# Patient Record
Sex: MALE | Race: WHITE | NOT HISPANIC OR LATINO | Employment: OTHER | URBAN - METROPOLITAN AREA
[De-identification: names, ages, dates, MRNs, and addresses within clinical notes are randomized per-mention and may not be internally consistent; named-entity substitution may affect disease eponyms.]

---

## 2017-12-30 ENCOUNTER — OFFICE VISIT (OUTPATIENT)
Dept: URGENT CARE | Facility: URGENT CARE | Age: 62
End: 2017-12-30

## 2017-12-30 VITALS
DIASTOLIC BLOOD PRESSURE: 92 MMHG | TEMPERATURE: 98.7 F | OXYGEN SATURATION: 96 % | HEART RATE: 95 BPM | WEIGHT: 250 LBS | SYSTOLIC BLOOD PRESSURE: 140 MMHG

## 2017-12-30 DIAGNOSIS — R59.0 ANTERIOR CERVICAL ADENOPATHY: Primary | ICD-10-CM

## 2017-12-30 PROCEDURE — 99203 OFFICE O/P NEW LOW 30 MIN: CPT | Performed by: STUDENT IN AN ORGANIZED HEALTH CARE EDUCATION/TRAINING PROGRAM

## 2017-12-30 NOTE — PROGRESS NOTES
SUBJECTIVE:  Stevie Dotson is a pleasant 63 y/o male presenting to the clinic for evaluation of R neck swelling. The patient notes his symptoms began with a sore tooth for one day, which subsided yesterday. After awakening this morning, he notes that the right side of his neck seemed to have mild swelling. He denies any pain with this. No fever, nausea, or vomiting. He has not noticed discharge from this tooth, which was previously sore. He has never had same or similar symptoms. No treatments have been tried.    Notes he is otherwise healthy  No recent surgeries    Social History     Social History     Marital status:      Spouse name: N/A     Number of children: N/A     Years of education: N/A     Occupational History     Not on file.     Social History Main Topics     Smoking status: Not on file     Smokeless tobacco: Not on file     Alcohol use Not on file     Drug use: Not on file     Sexual activity: Not on file     Other Topics Concern     Not on file     Social History Narrative     No narrative on file          OBJECTIVE:  BP (!) 140/92 (BP Location: Right arm)  Pulse 95  Temp 98.7  F (37.1  C) (Oral)  Wt 250 lb (113.4 kg)  SpO2 96%  Physical Exam   Constitutional: He is well-developed, well-nourished, and in no distress. No distress.   HENT:   Head: Normocephalic and atraumatic.   Mouth/Throat: Uvula is midline, oropharynx is clear and moist and mucous membranes are normal. He does not have dentures. No oral lesions. Normal dentition. No dental abscesses, lacerations or dental caries. No oropharyngeal exudate or posterior oropharyngeal edema.   Neck: Carotid bruit is not present.       Cardiovascular: Normal rate, regular rhythm and normal heart sounds.    Pulmonary/Chest: Effort normal and breath sounds normal.   Musculoskeletal: Normal range of motion.   Skin: He is not diaphoretic.         ASSESSMENT/PLAN:  Stevie was seen today for urgent care and facial swelling.    Diagnoses and all  orders for this visit:    Anterior cervical adenopathy      Presentation and physical exam is consistent with anterior cervical adenopathy. The patient previously had a tooth ache, which has now resolved. There are no signs of infection or abscess. The patient does not have pain associated with the adenopathy. Likely benign in nature due to being afebrile and feeling well overall. The patient was encouraged to use 600 my ibuprofen 3 times a day to help with swelling. He can also use warm compress to help decrease swelling overall. Educated on the benign nature. If symptoms are not resolving over the next 1-2 weeks, follow-up with PCP. At that time, possible US.    Bipin Wilkins PA-C

## 2017-12-30 NOTE — MR AVS SNAPSHOT
"              After Visit Summary   2017    Stevie Dotson    MRN: [id number]           Patient Information     Date Of Birth          1955        Visit Information        Provider Department      2017 9:05 AM Bipin Wilkins PA-C [facility]        Today's Diagnoses     Anterior cervical adenopathy    -  1       Follow-ups after your visit        Who to contact     If you have questions or need follow up information about today's clinic visit or your schedule please contact [facility] directly at [phone].  Normal or non-critical lab and imaging results will be communicated to you by PaxVaxhart, letter or phone within 4 business days after the clinic has received the results. If you do not hear from us within 7 days, please contact the clinic through PaxVaxhart or phone. If you have a critical or abnormal lab result, we will notify you by phone as soon as possible.  Submit refill requests through Uanbai or call your pharmacy and they will forward the refill request to us. Please allow 3 business days for your refill to be completed.          Additional Information About Your Visit        MyChart Information     Uanbai lets you send messages to your doctor, view your test results, renew your prescriptions, schedule appointments and more. To sign up, go to [email or web address] . Click on \"Log in\" on the left side of the screen, which will take you to the Welcome page. Then click on \"Sign up Now\" on the right side of the page.     You will be asked to enter the access code listed below, as well as some personal information. Please follow the directions to create your username and password.     Your access code is: [email or web address]  Expires: 3/30/2018  9:54 AM     Your access code will  in 90 days. If you need help or a new code, please call your [place] clinic or [phone].        Care EveryWhere ID     This is your Care EveryWhere ID. This could " be used by other organizations to access your [place] medical records  [id number]        Your Vitals Were     Pulse Temperature Pulse Oximetry             95 98.7  F (37.1  C) (Oral) 96%          Blood Pressure from Last 3 Encounters:   12/30/17 (!) 140/92    Weight from Last 3 Encounters:   12/30/17 250 lb (113.4 kg)              Today, you had the following     No orders found for display       Primary Care Provider Office Phone # Fax #    [facility] [phone] [address] [facility] [address]        Equal Access to Services     [facility]: Hadii aad ku hadasho Soomaali, waaxda luqadaha, qaybta kaalmada adeegyada, cindi erickson adejoana hall . So [facility] [phone].    ATENCIÓN: Si habla español, tiene a cain disposición servicios gratuitos de asistencia lingüística. Llame al [phone].    We comply with applicable federal civil rights laws and [place] laws. We do not discriminate on the basis of race, color, national origin, age, disability, sex, sexual orientation, or gender identity.            Thank you!     Thank you for choosing [facility]  for your care. Our goal is always to provide you with excellent care. Hearing back from our patients is one way we can continue to improve our services. Please take a few minutes to complete the written survey that you may receive in the mail after your visit with us. Thank you!             Your Updated Medication List - Protect others around you: Learn how to safely use, store and throw away your medicines at www.disposemymeds.org.          This list is accurate as of: 12/30/17  9:54 AM.  Always use your most recent med list.                   Brand Name Dispense Instructions for use Diagnosis    FLUTICASONE FUROATE NA           OMEPRAZOLE PO

## 2017-12-30 NOTE — NURSING NOTE
Chief Complaint   Patient presents with     Urgent Care     Facial Swelling     right side of neck swelling following tooth ache      Initial BP (!) 140/92 (BP Location: Right arm)  Pulse 95  Temp 98.7  F (37.1  C) (Oral)  Wt 250 lb (113.4 kg)  SpO2 96% There is no height or weight on file to calculate BMI..  BP completed using cuff size: large    Angle Chen CMA.............................December 30, 2017 9:19 AM

## 2018-11-14 ENCOUNTER — OFFICE VISIT (OUTPATIENT)
Dept: FAMILY MEDICINE | Facility: CLINIC | Age: 63
End: 2018-11-14
Payer: COMMERCIAL

## 2018-11-14 VITALS
WEIGHT: 254.5 LBS | RESPIRATION RATE: 16 BRPM | OXYGEN SATURATION: 96 % | SYSTOLIC BLOOD PRESSURE: 138 MMHG | TEMPERATURE: 98.9 F | HEIGHT: 74 IN | DIASTOLIC BLOOD PRESSURE: 90 MMHG | BODY MASS INDEX: 32.66 KG/M2 | HEART RATE: 64 BPM

## 2018-11-14 DIAGNOSIS — L72.3 SEBACEOUS CYST: Primary | ICD-10-CM

## 2018-11-14 DIAGNOSIS — Z12.11 SCREEN FOR COLON CANCER: ICD-10-CM

## 2018-11-14 PROCEDURE — 99213 OFFICE O/P EST LOW 20 MIN: CPT | Performed by: FAMILY MEDICINE

## 2018-11-14 NOTE — PATIENT INSTRUCTIONS
Follow-up with Dermatology for consideration of sebaceous cyst removal, as discussed.    Follow-up if redness, swelling, or pain involving the sebaceous cyst site prior to your appointment, as discussed.      Please schedule your colonoscopy, as discussed.

## 2018-11-14 NOTE — PROGRESS NOTES
"SUBJECTIVE:   Stevie Dotson is a 63 year old male presenting with a chief complaint of   Chief Complaint   Patient presents with     Abscess     on back      HPI:  The patient is a 63-year-old male, with history of a skin lesion/bump involving his back, first noticed 10 years ago.  This was stable until 1-2 weeks ago.  Skin lesion is not enlarging, but patient noticed some redness and pain with leaning on it 1-2 weeks ago.  Wife expressed a small amount of whitish material from the lesion a few days ago, which resolved the redness and pain.  Patient denies fever, chills, nausea, or vomiting, but he is concerned that material can still be expressed from lesion.  Patient states that he has had previous sebaceous cysts removed from his neck and axilla.  Patient states that he is here, as he would like to have the \"abscess\" drained.    Patient states that he is due for a follow-up colonoscopy, which he would like to schedule.  Patient states that his previous colonoscopy showed an unspecified polyp, but records are not available for review.  Patient denies bowel changes, melena, hematochezia, chest pain, shortness of breath, heart palpitations, history of CAD, NSAID treatment, or Aspirin use.  He does not have a recent Creatinine on file.    Review of Systems - See above.     Current Outpatient Prescriptions   Medication Sig Dispense Refill     FLUTICASONE FUROATE NA        OMEPRAZOLE PO        Social History   Substance Use Topics     Smoking status: Former Smoker     Smokeless tobacco: Never Used     Alcohol use Yes       OBJECTIVE  /90 (BP Location: Right arm, Patient Position: Chair, Cuff Size: Adult Large)  Pulse 64  Temp 98.9  F (37.2  C) (Oral)  Resp 16  Ht 6' 2\" (1.88 m)  Wt 254 lb 8 oz (115.4 kg)  SpO2 96%  BMI 32.68 kg/m2    Physical Exam    GENERAL APPEARANCE:  Awake, alert, and in no acute distress.  PSYCHIATRIC:  Pleasant affect.  HEENT:  Sclera anicteric.  No conjunctivitis.    NECK:  " Spontaneous full range of motion.    HEART:  Normal S1, S2.  Regular rate and rhythm.  No murmurs, rubs, or gallops.  LUNGS:  No respiratory distress.  No wheezes, rales, or rhonchi.  EXTREMITIES:  Moves 4 extremities.     NEUROLOGIC:  Gait within normal limits.    SKIN: The patient has a 2 x 3 cm sebaceous cyst involving his thoracic back (midline), with a central punctum noted.  No erythema, tenderness to palpation, edema, or fluctuance noted.    Labs:  No results found for this or any previous visit (from the past 24 hour(s)).    ASSESSMENT:      ICD-10-CM    1. Sebaceous cyst (back), without current infection or abscess. L72.3 DERMATOLOGY REFERRAL   2. Screen for colon cancer.  Patient states that he is due for a follow-up Colonoscopy. Records of his previous study are not available for review, but patient states that an unspecified polyp was seen. Z12.11 GASTROENTEROLOGY ADULT REF PROCEDURE ONLY Makayla Meyer [phone]     PLAN:      Discussed definitive treatment/removal of the patient's sebaceous cyst, which patient would like removed at this time.    Discussed lack of indication for I&D (no current infection or abscess).    See the Patient Instructions below.      Patient declines a Creatinine prior to his follow up Colonoscopy, which he would like to schedule.    Patient Instructions     Follow-up with Dermatology for consideration of sebaceous cyst removal, as discussed.    Follow-up if redness, swelling, or pain involving the sebaceous cyst site prior to your appointment, as discussed.      Please schedule your colonoscopy, as discussed.    Discussed risks and benefits of treatment strategies, as noted in the Assessment and Plan sections.    The patient was discharged ambulatory and in stable condition post discussion of follow up.     Marcella Solorio MD  [facility]

## 2018-11-14 NOTE — MR AVS SNAPSHOT
After Visit Summary   11/14/2018    Stevie Dotson    MRN: [id number]           Patient Information     Date Of Birth          1955        Visit Information        Provider Department      11/14/2018 11:20 AM Marcella Solorio MD [facility]        Today's Diagnoses     Sebaceous cyst    -  1    Screen for colon cancer          Care Instructions      Follow-up with Dermatology for consideration of sebaceous cyst removal, as discussed.    Follow-up if redness, swelling, or pain involving the sebaceous cyst site prior to your appointment, as discussed.      Please schedule your colonoscopy, as discussed.          Follow-ups after your visit        Additional Services     DERMATOLOGY REFERRAL       Your provider has referred you to: [facility]: [facility] [place] [phone]    Please be aware that coverage of these services is subject to the terms and limitations of your health insurance plan.  Call member services at your health plan with any benefit or coverage questions.      Please bring the following to your appointment:  Any x-rays, CTs or MRIs which have been performed.  Contact the facility where they were done to arrange for  prior to your scheduled appointment.  Any new CT, MRI or other procedures ordered by your specialist must be performed at a [place] facility or coordinated by your clinic's referral office.    List of current medications   This referral request   Any documents/labs given to you for this referral            GASTROENTEROLOGY ADULT REF PROCEDURE ONLY Jaydens Mitch [phone]       Last Lab Result: No results found for: CR  Body mass index is 32.68 kg/(m^2).     Needed:  No  Language:  English    Patient will be contacted to schedule procedure.     Please be aware that coverage of these services is subject to the terms and limitations of your health insurance plan.  Call member services at your  "health plan with any benefit or coverage questions.  Any procedures must be performed at a [place] facility OR coordinated by your clinic's referral office.    Please bring the following with you to your appointment:    (1) Any X-Rays, CTs or MRIs which have been performed.  Contact the facility where they were done to arrange for  prior to your scheduled appointment.    (2) List of current medications   (3) This referral request   (4) Any documents/labs given to you for this referral                  Who to contact     If you have questions or need follow up information about today's clinic visit or your schedule please contact [facility] directly at [phone].  Normal or non-critical lab and imaging results will be communicated to you by MyChart, letter or phone within 4 business days after the clinic has received the results. If you do not hear from us within 7 days, please contact the clinic through MyChart or phone. If you have a critical or abnormal lab result, we will notify you by phone as soon as possible.  Submit refill requests through TrashOut or call your pharmacy and they will forward the refill request to us. Please allow 3 business days for your refill to be completed.          Additional Information About Your Visit        Care EveryWhere ID     This is your Care EveryWhere ID. This could be used by other organizations to access your [place] medical records  [id number]        Your Vitals Were     Pulse Temperature Respirations Height Pulse Oximetry BMI (Body Mass Index)    64 98.9  F (37.2  C) (Oral) 16 6' 2\" (1.88 m) 96% 32.68 kg/m2       Blood Pressure from Last 3 Encounters:   11/14/18 138/90   12/30/17 (!) 140/92    Weight from Last 3 Encounters:   11/14/18 254 lb 8 oz (115.4 kg)   12/30/17 250 lb (113.4 kg)              We Performed the Following     DERMATOLOGY REFERRAL     GASTROENTEROLOGY ADULT REF PROCEDURE ONLY Makayla Meyer [phone]        Primary " Care Provider Fax #    Physician No Ref-Primary [phone]       No address on file        Equal Access to Services     [facility] : Hadii aad ku hadmarkywendi Kellysuman, wagradyda anglealexandraha, lisa torresda josesitochuck, cindi baltain hayaadaren ebllamyjoana fernandez rafael babin. So [facility] [phone].    ATENCIÓN: Si habla español, tiene a cain disposición servicios gratuitos de asistencia lingüística. Llame al [phone].    We comply with applicable federal civil rights laws and [place] laws. We do not discriminate on the basis of race, color, national origin, age, disability, sex, sexual orientation, or gender identity.            Thank you!     Thank you for choosing [facility]  for your care. Our goal is always to provide you with excellent care. Hearing back from our patients is one way we can continue to improve our services. Please take a few minutes to complete the written survey that you may receive in the mail after your visit with us. Thank you!             Your Updated Medication List - Protect others around you: Learn how to safely use, store and throw away your medicines at www.disposemymeds.org.          This list is accurate as of 11/14/18 11:56 AM.  Always use your most recent med list.                   Brand Name Dispense Instructions for use Diagnosis    FLUTICASONE FUROATE NA           OMEPRAZOLE PO

## 2018-12-07 ENCOUNTER — TELEPHONE (OUTPATIENT)
Dept: DERMATOLOGY | Facility: CLINIC | Age: 63
End: 2018-12-07

## 2018-12-07 ENCOUNTER — OFFICE VISIT (OUTPATIENT)
Dept: DERMATOLOGY | Facility: CLINIC | Age: 63
End: 2018-12-07
Attending: FAMILY MEDICINE
Payer: COMMERCIAL

## 2018-12-07 VITALS — SYSTOLIC BLOOD PRESSURE: 154 MMHG | DIASTOLIC BLOOD PRESSURE: 101 MMHG | OXYGEN SATURATION: 97 % | HEART RATE: 67 BPM

## 2018-12-07 DIAGNOSIS — L72.0 EIC (EPIDERMAL INCLUSION CYST): Primary | ICD-10-CM

## 2018-12-07 PROCEDURE — 99202 OFFICE O/P NEW SF 15 MIN: CPT | Performed by: PHYSICIAN ASSISTANT

## 2018-12-07 NOTE — PROGRESS NOTES
HPI:   Stevie Dotson is a 63 year old male who presents for evaluation of cyst on back  chief complaint  Location: mid back   Condition present for:  awhile.   Previous treatments include: none    Review Of Systems  Eyes: negative  Ears/Nose/Throat: negative  Respiratory: No shortness of breath, dyspnea on exertion, cough, or hemoptysis  Cardiovascular: negative  Gastrointestinal: negative  Genitourinary: negative  Musculoskeletal: negative  Neurologic: negative  Psychiatric: negative    This document serves as a record of the services and decisions personally performed and made by Izabella Russo, MS, PA-C. It was created on her behalf by Adri Orta, a trained medical scribe. The creation of this document is based on the provider's statements to the medical scribe.  Adri Orta 10:50 AM December 7, 2018    PHYSICAL EXAM:    BP (!) 154/101  Pulse 67  SpO2 97%  Skin exam performed as follows: Type 2 skin. Mood appropriate  Alert and Oriented X 3. Well developed, well nourished in no distress.  General appearance: Normal  Head including face: Normal  Eyes: conjunctiva and lids: Normal  Mouth: Lips, teeth, gums: Normal  Neck: Normal  Chest-breast/axillae: Normal  Back: Normal  Spleen and liver: Normal  Cardiovascular: Exam of peripheral vascular system by observation for swelling, varicosities, edema: Normal  Genitalia: groin, buttocks: Normal  Extremities: digits/nails (clubbing): Normal  Eccrine and Apocrine glands: Normal  Right upper extremity: Normal  Left upper extremity: Normal  Right lower extremity: Normal  Left lower extremity: Normal  Skin: Scalp and body hair: See below    1. 25 mm subcutaneous nodule located on mid lower back    ASSESSMENT/PLAN:     1. EIC on the mid lower back- advised. Discussed excision with Dr. Andrews or Swapna Clarke if bothersome.  2. Patient to follow up with Primary Care provider regarding elevated blood pressure.      Follow-up: follow up for  excision  CC:   Scribed By: Adri Orta, Medical Scribe    The information in this document, created by the medical scribe for me, accurately reflects the services I personally performed and the decisions made by me. I have reviewed and approved this document for accuracy prior to leaving the patient care area.  December 7, 2018 10:53 AM    Izabella Russo MS, PA-C

## 2018-12-07 NOTE — LETTER
12/7/2018         RE: Stevie Dotson  [address]  [place] [address]        Dear Colleague,    Thank you for referring your patient, Stevie Dotson, to the [facility]. Please see a copy of my visit note below.    HPI:   Stevie Dotson is a 63 year old male who presents for evaluation of cyst on back  chief complaint  Location: mid back   Condition present for:  awhile.   Previous treatments include: none    Review Of Systems  Eyes: negative  Ears/Nose/Throat: negative  Respiratory: No shortness of breath, dyspnea on exertion, cough, or hemoptysis  Cardiovascular: negative  Gastrointestinal: negative  Genitourinary: negative  Musculoskeletal: negative  Neurologic: negative  Psychiatric: negative    This document serves as a record of the services and decisions personally performed and made by Izabella Russo, MS, PA-C. It was created on her behalf by Adri Orta, a trained medical scribe. The creation of this document is based on the provider's statements to the medical scribe.  Adri Orta 10:50 AM December 7, 2018    PHYSICAL EXAM:    BP (!) 154/101  Pulse 67  SpO2 97%  Skin exam performed as follows: Type 2 skin. Mood appropriate  Alert and Oriented X 3. Well developed, well nourished in no distress.  General appearance: Normal  Head including face: Normal  Eyes: conjunctiva and lids: Normal  Mouth: Lips, teeth, gums: Normal  Neck: Normal  Chest-breast/axillae: Normal  Back: Normal  Spleen and liver: Normal  Cardiovascular: Exam of peripheral vascular system by observation for swelling, varicosities, edema: Normal  Genitalia: groin, buttocks: Normal  Extremities: digits/nails (clubbing): Normal  Eccrine and Apocrine glands: Normal  Right upper extremity: Normal  Left upper extremity: Normal  Right lower extremity: Normal  Left lower extremity: Normal  Skin: Scalp and body hair: See below    1. 25 mm subcutaneous nodule located on  mid lower back    ASSESSMENT/PLAN:     1. EIC on the mid lower back- advised. Discussed excision with Dr. Andrews or Swapna Clarke if bothersome.  2. Patient to follow up with Primary Care provider regarding elevated blood pressure.      Follow-up: follow up for excision  CC:   Scribed By: Adri Orta, Medical Scribe    The information in this document, created by the medical scribe for me, accurately reflects the services I personally performed and the decisions made by me. I have reviewed and approved this document for accuracy prior to leaving the patient care area.  December 7, 2018 10:53 AM    Izabella Arrington MS, PAARMIDA      Again, thank you for allowing me to participate in the care of your patient.        Sincerely,        Izabella Arrington PA-C

## 2018-12-07 NOTE — TELEPHONE ENCOUNTER
Called and spoke to patient. Informed patient that we have a cyst removal appointment here in [place] if that's more convenient. Patient stated he will just keep the current appointment in . Patient voiced understanding.    JUANITA Garcia-BSN  [facility]  [phone]

## 2018-12-07 NOTE — MR AVS SNAPSHOT
After Visit Summary   12/7/2018    Stevie Dotson    MRN: [id number]           Patient Information     Date Of Birth          1955        Visit Information        Provider Department      12/7/2018 11:00 AM Izabella Russo PA-C [facility]        Today's Diagnoses     EIC (epidermal inclusion cyst)    -  1       Follow-ups after your visit        Your next 10 appointments already scheduled     Dec 14, 2018  2:40 PM CST   PROCEDURE with Tasneem Mckinley PA-C   [facility] ([facility])    [address] [place] [id number] [phone]              Who to contact     If you have questions or need follow up information about today's clinic visit or your schedule please contact [facility] directly at [phone].  Normal or non-critical lab and imaging results will be communicated to you by MyChart, letter or phone within 4 business days after the clinic has received the results. If you do not hear from us within 7 days, please contact the clinic through MyChart or phone. If you have a critical or abnormal lab result, we will notify you by phone as soon as possible.  Submit refill requests through [facility] or call your pharmacy and they will forward the refill request to us. Please allow 3 business days for your refill to be completed.          Additional Information About Your Visit        Care EveryWhere ID     This is your Care EveryWhere ID. This could be used by other organizations to access your [facility] medical records  [id number]        Your Vitals Were     Pulse Pulse Oximetry                67 97%           Blood Pressure from Last 3 Encounters:   12/07/18 (!) 154/101   11/14/18 138/90   12/30/17 (!) 140/92    Weight from Last 3 Encounters:   11/14/18 115.4 kg (254 lb 8 oz)   12/30/17 113.4 kg (250 lb)              Today, you had the following     No orders found for  display       Primary Care Provider Fax #    Physician No Ref-Primary [phone]       No address on file        Equal Access to Services     LILLY CONNELL : Hadii aad ku hadshirley Gomez, lizethda anglealexandraha, lisa lu josesitochuck, cindi batlain hayaadaren bellamyjoana fernandez laJohanmary babin. [facility] [phone].    ATENCIÓN: Si habla español, tiene a cain disposición servicios gratuitos de asistencia lingüística. Llame al [phone].    We comply with applicable federal civil rights laws and [place] laws. We do not discriminate on the basis of race, color, national origin, age, disability, sex, sexual orientation, or gender identity.            Thank you!     Thank you for choosing [facility]  for your care. Our goal is always to provide you with excellent care. Hearing back from our patients is one way we can continue to improve our services. Please take a few minutes to complete the written survey that you may receive in the mail after your visit with us. Thank you!             Your Updated Medication List - Protect others around you: Learn how to safely use, store and throw away your medicines at www.disposemymeds.org.          This list is accurate as of 12/7/18 11:07 AM.  Always use your most recent med list.                   Brand Name Dispense Instructions for use Diagnosis    FLUTICASONE FUROATE NA           OMEPRAZOLE PO

## 2018-12-14 ENCOUNTER — OFFICE VISIT (OUTPATIENT)
Dept: FAMILY MEDICINE | Facility: CLINIC | Age: 63
End: 2018-12-14
Payer: COMMERCIAL

## 2018-12-14 VITALS — DIASTOLIC BLOOD PRESSURE: 97 MMHG | SYSTOLIC BLOOD PRESSURE: 156 MMHG | OXYGEN SATURATION: 95 % | HEART RATE: 92 BPM

## 2018-12-14 DIAGNOSIS — L72.0 EPIDERMAL CYST: Primary | ICD-10-CM

## 2018-12-14 PROCEDURE — 13101 CMPLX RPR TRUNK 2.6-7.5 CM: CPT | Performed by: PHYSICIAN ASSISTANT

## 2018-12-14 PROCEDURE — 99212 OFFICE O/P EST SF 10 MIN: CPT | Mod: 25 | Performed by: PHYSICIAN ASSISTANT

## 2018-12-14 PROCEDURE — 88304 TISSUE EXAM BY PATHOLOGIST: CPT | Mod: TC | Performed by: PHYSICIAN ASSISTANT

## 2018-12-14 PROCEDURE — 11403 EXC TR-EXT B9+MARG 2.1-3CM: CPT | Mod: 51 | Performed by: PHYSICIAN ASSISTANT

## 2018-12-14 NOTE — PROGRESS NOTES
HPI:  Stevie Dotson is a 63 year old male patient here today for cyst excision on back .  Patient states this has been present for 12 years.  Patient reports the following symptoms: growing and draining .  Patient reports the following previous treatments: none.  Patient reports the following modifying factors: none.  Associated symptoms: none. Also has some brown spots on back that have been there for 12+ years. No tx tried.  Patient has no other skin complaints today.  Remainder of the HPI, Meds, PMH, Allergies, FH, and SH was reviewed in chart.    Pertinent Hx:   No personal history of skin cancer    History reviewed. No pertinent past medical history.    History reviewed. No pertinent surgical history.     Family History   Problem Relation Age of Onset     Skin Cancer Father        Social History     Socioeconomic History     Marital status:      Spouse name: Not on file     Number of children: Not on file     Years of education: Not on file     Highest education level: Not on file   Social Needs     Financial resource strain: Not on file     Food insecurity - worry: Not on file     Food insecurity - inability: Not on file     Transportation needs - medical: Not on file     Transportation needs - non-medical: Not on file   Occupational History     Not on file   Tobacco Use     Smoking status: Former Smoker     Smokeless tobacco: Never Used   Substance and Sexual Activity     Alcohol use: Yes     Drug use: No     Sexual activity: Yes     Partners: Female   Other Topics Concern     Parent/sibling w/ CABG, MI or angioplasty before 65F 55M? Not Asked   Social History Narrative     Not on file       Outpatient Encounter Medications as of 12/14/2018   Medication Sig Dispense Refill     FLUTICASONE FUROATE NA        OMEPRAZOLE PO        No facility-administered encounter medications on file as of 12/14/2018.        Review Of Systems:  Skin: As above  Eyes: negative  Ears/Nose/Throat: negative  Respiratory:  No shortness of breath, dyspnea on exertion, cough, or hemoptysis  Cardiovascular: negative  Gastrointestinal: negative  Genitourinary: negative  Musculoskeletal: negative  Neurologic: negative  Psychiatric: negative  Hematologic/Lymphatic/Immunologic: negative  Endocrine: negative      Objective:     BP (!) 156/97   Pulse 92   SpO2 95%   Eyes: Conjunctivae/lids: Normal   ENT: Lips:  Normal  MSK: Normal  Cardiovascular: Peripheral edema none  Pulm: Breathing Normal  Neuro/Psych: Orientation: Normal; Mood/Affect: Normal, NAD, WDWN  Pt accompanied by: self  Following areas examined: face, neck, back  Castaneda skin type:ii   Findings:  Soft mobile smooth nodule on mid lower back 2.5cm  Brown, stuck-on scaly appearing papules on back    Assessment and Plan:  1) epidermal cyst 2.5cm on mid lower back  EXCISIONAL BIOPSY AND COMPLEX:  After thorough discussion of PGACAC, consent obtained, anesthesia with LEC and prep, the margins of the lesion were identified and an elliptical incision was made encompassing the lesion.  The incisions were made through to include the mid-subcutaneous tissue.  The lesion was removed en bloc and submitted for derm pathologic review. Because of the full-thickness nature of the wound and to avoid standing cone deformities, a complex linear repair was planned.  The wound edges were widely undermined until adequate tissue mobility was obtained.  Hemostasis was achieved.  The wound edges were then closed in a layered fashion, using Vicryl for subcutaneous stitches and FAPG sutures for running top stitches.  Postoperative length was 2.7 cm.  Patient will be contacted with result.  EBL minimal; complications none; wound care routine.  The patient was discharged in good condition and will return in one week for wound evaluation.    2) seborrheic keratoses  Treatment of these lesions would be purely cosmetic and not medically neccessary.  Lesion may recur and/or may not completely resolve. May  need additional treatment.  Different removal options including excision, cryotherapy, cautery and /or laser.      3) High blood pressure reading with history of hypertension    Patient to follow up with Primary Care provider regarding elevated blood pressure.      Follow up in 1 week for bandage change

## 2018-12-14 NOTE — NURSING NOTE
Recommended that patient return for bandage change in 7 days for best cosmetic result . Patient deferred and prefers his wife Betzaida, an RN  to do the bandage change. Writer included written instructions in after visit summary and verbally went over care . Patient verbally understands and will contact the clinic if they have any questions.    Elyssa Brooks LPN

## 2018-12-14 NOTE — PATIENT INSTRUCTIONS
PLEASE DO NOT GET WOUND WET. KEEP BANDAGE ON FOR 1 WEEK UNTIL YOUR FOLLOW UP.     Sutured Wound Care     [facility]: [phone]    [facility]: [phone]      1) IN 7 DAYS REMOVE FLAT BANDAGE AND SERI STRIPS  2) CLEAN/ DRY AROUND AREA AND RE-APPLY FRESH STERI STRIPS AND A FLAT BANDAGE   3) KEEP DRY FOR AN ADDITIONAL 7 DAYS (SPONGE BATH)  4) FOLLOW WOUND CARE INSTRUCTIONS PROVIDED BELOW      ? No strenuous activity for 48 hours. Resume moderate activity in 48 hours. No heavy exercising until you are seen for follow up in one week.     ? Take Tylenol as needed for discomfort.                         ? Do not drink alcoholic beverages for 48 hours.     ? Keep the pressure bandage in place for 24 hours. If the bandage becomes blood tinged or loose, reinforce it with gauze and tape.        (Refer to the reverse side of this page for management of bleeding).    ? Remove pressure bandage in 24 hours     ? Leave the flat bandage in place until your follow up appointment.    ? Keep the bandage dry. Wash around it carefully.    ? If the tape becomes soiled or starts to come off, reinforce it with additional paper tape.    ? Do not smoke for 3 weeks; smoking is detrimental to wound healing.    ? It is normal to have swelling and bruising around the surgical site. The bruising will fade in approximately 10-14 days. Elevate the area to reduce swelling.    ? Numbness, itchiness and sensitivity to temperature changes can occur after surgery and may take up to 18 months to normalize.      POSSIBLE COMPLICATIONS    BLEEDIN. Leave the bandage in place.  2. Use tightly rolled up gauze or a cloth to apply direct pressure over the bandage for 20   minutes.  3. Reapply pressure for an additional 20 minutes if necessary  4. Call the office or go to the nearest emergency room if pressure fails to stop the bleeding.  5. Use additional gauze and tape to maintain pressure once the bleeding has  stopped.        PAIN:    1. Post operative pain should slowly get better, never worse.  2. A severe increase in pain may indicate a problem. Call the office if this occurs.    In case of emergency phone: [phone]      Proper skin care from [facility]:    -Eliminate harsh soaps as they strip the natural oils from the skin, often resulting in dry itchy skin ( i.e. Dial, Zest, Samoan Spring)  -Use mild soaps such as Cetaphil or Dove Sensitive Skin in the shower. You do not need to use soap on arms, legs, and trunk every time you shower unless visibly soiled.   -Avoid hot or cold showers.  -After showering, lightly dry off and apply moisturizing within 2-3 minutes. This will help trap moisture in the skin.   -Aggressive use of a moisturizer at least 1-2 times a day to the entire body (including -Vanicream, Cetaphil, Aquaphor or Cerave) and moisturize hands after every washing.  -We recommend using moisturizers that come in a tub that needs to be scooped out, not a pump. This has more of an oil base. It will hold moisture in your skin much better than a water base moisturizer. The above recommended are non-pore clogging.    .... AFTER 14 DAYS PLEASE FOLLOW THESE INSTRUCTIONS    WOUND CARE INSTRUCTIONS   for  ONE WEEK AFTER SURGERY          1) Leave flat bandage on your skin for one week after today s bandage change.  2) In one week when you remove the bandage, you may resume your regular skin care routine, including washing with mild soap and water, applying moisturizer, make-up and sunscreen.    3) If there are any open or bleeding areas at the incision/graft site you should begin to cover the area with a bandage daily as follows:    1) Clean and dry the area with plain tap water using a Q-tip or sterile gauze pad.  2) Apply Polysporin or Bacitracin ointment to the open area.  3) Cover the wound with a band-aid or a sterile non-stick gauze pad and micropore paper tape.         SIGNS OF INFECTION  - If you  notice any of these signs of infection, call your doctor right away: expanding redness around the wound.  - Yellow or greenish-colored pus or cloudy wound drainage.    - Red streaking spreading from the wound.  - Increased swelling, tenderness, or pain around the wound.   - Fever.    Please remember that yellow and clear drainage from a wound can be normal and related to normal wound healing.  Isolated drainage from a wound without a combination of the above features does not indicate infection.       *Once the bandages are removed, the scar will be red and firm (especially in the lip/chin area). This is normal and will fade in time. It might take 6-12 months for this to happen.     *Massaging the area will help the scar soften and fade quicker. Begin to massage the area one month after the bandages have been removed. To massage apply pressure directly and firmly over the scar with the fingertips and move in a circular motion. Massage the area for a few minutes several times a day. Continue to massage the site for several months.    *Approximately 6-8 weeks after surgery it is not uncommon to see the formation of  tender pimple-like  bump along the scar. This is normal. As the scar continues to mature and the stitches underneath the skin begin to dissolve, this might occur. Do not pick or squeeze, this will resolve on it s own. Should one break open producing a small amount of drainage, apply Polysporin or Bacitracin ointment a few times a day until the wound is completely healed.    *Numbness in the surgical area is expected. It might take 12-18 months for the feeling to return to normal. During this time sensations of itchiness, tingling and occasional sharp pains might be noted. These feelings are normal and will subside once the nerves have completely healed.

## 2018-12-14 NOTE — LETTER
12/14/2018         RE: Stevie Dotson  [address]  [place] [address]        Dear Colleague,    Thank you for referring your patient, Stevie Dotson, to the [facility] [place]. Please see a copy of my visit note below.    HPI:  Stevie Dotson is a 63 year old male patient here today for cyst excision on back .  Patient states this has been present for 12 years.  Patient reports the following symptoms: growing and draining .  Patient reports the following previous treatments: none.  Patient reports the following modifying factors: none.  Associated symptoms: none. Also has some brown spots on back that have been there for 12+ years. No tx tried.  Patient has no other skin complaints today.  Remainder of the HPI, Meds, PMH, Allergies, FH, and SH was reviewed in chart.    Pertinent Hx:   No personal history of skin cancer    History reviewed. No pertinent past medical history.    History reviewed. No pertinent surgical history.     Family History   Problem Relation Age of Onset     Skin Cancer Father        Social History     Socioeconomic History     Marital status:      Spouse name: Not on file     Number of children: Not on file     Years of education: Not on file     Highest education level: Not on file   Social Needs     Financial resource strain: Not on file     Food insecurity - worry: Not on file     Food insecurity - inability: Not on file     Transportation needs - medical: Not on file     Transportation needs - non-medical: Not on file   Occupational History     Not on file   Tobacco Use     Smoking status: Former Smoker     Smokeless tobacco: Never Used   Substance and Sexual Activity     Alcohol use: Yes     Drug use: No     Sexual activity: Yes     Partners: Female   Other Topics Concern     Parent/sibling w/ CABG, MI or angioplasty before 65F 55M? Not Asked   Social History Narrative     Not on file       Outpatient Encounter Medications as of 12/14/2018    Medication Sig Dispense Refill     FLUTICASONE FUROATE NA        OMEPRAZOLE PO        No facility-administered encounter medications on file as of 12/14/2018.        Review Of Systems:  Skin: As above  Eyes: negative  Ears/Nose/Throat: negative  Respiratory: No shortness of breath, dyspnea on exertion, cough, or hemoptysis  Cardiovascular: negative  Gastrointestinal: negative  Genitourinary: negative  Musculoskeletal: negative  Neurologic: negative  Psychiatric: negative  Hematologic/Lymphatic/Immunologic: negative  Endocrine: negative      Objective:     BP (!) 156/97   Pulse 92   SpO2 95%   Eyes: Conjunctivae/lids: Normal   ENT: Lips:  Normal  MSK: Normal  Cardiovascular: Peripheral edema none  Pulm: Breathing Normal  Neuro/Psych: Orientation: Normal; Mood/Affect: Normal, NAD, WDWN  Pt accompanied by: self  Following areas examined: face, neck, back  Castaneda skin type:ii   Findings:  Soft mobile smooth nodule on mid lower back 2.5cm  Brown, stuck-on scaly appearing papules on back    Assessment and Plan:  1) epidermal cyst 2.5cm on mid lower back  EXCISIONAL BIOPSY AND COMPLEX:  After thorough discussion of PGACAC, consent obtained, anesthesia with LEC and prep, the margins of the lesion were identified and an elliptical incision was made encompassing the lesion.  The incisions were made through to include the mid-subcutaneous tissue.  The lesion was removed en bloc and submitted for derm pathologic review. Because of the full-thickness nature of the wound and to avoid standing cone deformities, a complex linear repair was planned.  The wound edges were widely undermined until adequate tissue mobility was obtained.  Hemostasis was achieved.  The wound edges were then closed in a layered fashion, using Vicryl for subcutaneous stitches and FAPG sutures for running top stitches.  Postoperative length was 2.7 cm.  Patient will be contacted with result.  EBL minimal; complications none; wound care routine.  The  patient was discharged in good condition and will return in one week for wound evaluation.    2) seborrheic keratoses  Treatment of these lesions would be purely cosmetic and not medically neccessary.  Lesion may recur and/or may not completely resolve. May need additional treatment.  Different removal options including excision, cryotherapy, cautery and /or laser.      3) High blood pressure reading with history of hypertension    Patient to follow up with Primary Care provider regarding elevated blood pressure.      Follow up in 1 week for bandage change      Again, thank you for allowing me to participate in the care of your patient.        Sincerely,        Tasneem Mckinley PA-C

## 2018-12-18 LAB — COPATH REPORT: NORMAL

## 2018-12-19 ENCOUNTER — TELEPHONE (OUTPATIENT)
Dept: FAMILY MEDICINE | Facility: CLINIC | Age: 63
End: 2018-12-19

## 2018-12-19 NOTE — TELEPHONE ENCOUNTER
Notes recorded by Tasneem Mckinley PA-C on 12/19/2018 at 8:37 AM CST  Skin, mid lower back :   - Epidermal inclusion cyst     This is a benign lesion that does not need any additional treatment. Please let me know if you have any questions.

## 2018-12-19 NOTE — TELEPHONE ENCOUNTER
Patient notified of test results and providers message, patient has no further questions.    Aleena LOZANORN BSN  [facility]  [phone]

## 2019-02-26 ENCOUNTER — TELEPHONE (OUTPATIENT)
Dept: FAMILY MEDICINE | Facility: CLINIC | Age: 64
End: 2019-02-26

## 2019-02-26 NOTE — TELEPHONE ENCOUNTER
Wife called and stated that patient is going to be seeing you next week for a routine physical. It has been over a year since labs have been drawn per wife.     She knows that patient has high cholesterol, blood pressure and blood sugars.     She would like a full set of labs before appointment so that patient and provider can talk them over at the visit.     Patients wife makes appointments for him.     Call patient back, left message and let him know that he should come fasting to appointment but labs will not be ordered before appointment at this time due to new to Dr. Holguin and needs to meet before hand.     Also encouraged him to have consent to communicate with his wife filled out at time of appointment.     Mary Mccann RN Flex

## 2019-02-26 NOTE — TELEPHONE ENCOUNTER
Will do recommended labs at visit after informed consent and shared decision making on benefit/risk as should be done with all procedures including lab work.

## 2019-03-08 ENCOUNTER — OFFICE VISIT (OUTPATIENT)
Dept: FAMILY MEDICINE | Facility: CLINIC | Age: 64
End: 2019-03-08
Payer: COMMERCIAL

## 2019-03-08 VITALS
TEMPERATURE: 98.8 F | OXYGEN SATURATION: 95 % | DIASTOLIC BLOOD PRESSURE: 92 MMHG | SYSTOLIC BLOOD PRESSURE: 134 MMHG | WEIGHT: 249 LBS | BODY MASS INDEX: 33 KG/M2 | HEART RATE: 66 BPM | HEIGHT: 73 IN | RESPIRATION RATE: 16 BRPM

## 2019-03-08 DIAGNOSIS — Z12.11 SCREEN FOR COLON CANCER: ICD-10-CM

## 2019-03-08 DIAGNOSIS — Z00.00 ROUTINE GENERAL MEDICAL EXAMINATION AT A HEALTH CARE FACILITY: Primary | ICD-10-CM

## 2019-03-08 DIAGNOSIS — N52.9 ERECTILE DYSFUNCTION, UNSPECIFIED ERECTILE DYSFUNCTION TYPE: ICD-10-CM

## 2019-03-08 DIAGNOSIS — R03.0 ELEVATED BLOOD PRESSURE READING WITHOUT DIAGNOSIS OF HYPERTENSION: ICD-10-CM

## 2019-03-08 LAB
ALBUMIN SERPL-MCNC: 4 G/DL (ref 3.4–5)
ALP SERPL-CCNC: 61 U/L (ref 40–150)
ALT SERPL W P-5'-P-CCNC: 38 U/L (ref 0–70)
ANION GAP SERPL CALCULATED.3IONS-SCNC: 6 MMOL/L (ref 3–14)
AST SERPL W P-5'-P-CCNC: 18 U/L (ref 0–45)
BILIRUB SERPL-MCNC: 1.6 MG/DL (ref 0.2–1.3)
BUN SERPL-MCNC: 15 MG/DL (ref 7–30)
CALCIUM SERPL-MCNC: 9.2 MG/DL (ref 8.5–10.1)
CHLORIDE SERPL-SCNC: 103 MMOL/L (ref 94–109)
CHOLEST SERPL-MCNC: 232 MG/DL
CO2 SERPL-SCNC: 27 MMOL/L (ref 20–32)
CREAT SERPL-MCNC: 1.08 MG/DL (ref 0.66–1.25)
GFR SERPL CREATININE-BSD FRML MDRD: 72 ML/MIN/{1.73_M2}
GLUCOSE SERPL-MCNC: 123 MG/DL (ref 70–99)
HDLC SERPL-MCNC: 41 MG/DL
LDLC SERPL CALC-MCNC: 163 MG/DL
NONHDLC SERPL-MCNC: 191 MG/DL
POTASSIUM SERPL-SCNC: 4.2 MMOL/L (ref 3.4–5.3)
PROT SERPL-MCNC: 7.7 G/DL (ref 6.8–8.8)
SODIUM SERPL-SCNC: 136 MMOL/L (ref 133–144)
TRIGL SERPL-MCNC: 138 MG/DL

## 2019-03-08 PROCEDURE — 80053 COMPREHEN METABOLIC PANEL: CPT | Performed by: FAMILY MEDICINE

## 2019-03-08 PROCEDURE — 36415 COLL VENOUS BLD VENIPUNCTURE: CPT | Performed by: FAMILY MEDICINE

## 2019-03-08 PROCEDURE — 80061 LIPID PANEL: CPT | Performed by: FAMILY MEDICINE

## 2019-03-08 PROCEDURE — 99396 PREV VISIT EST AGE 40-64: CPT | Performed by: FAMILY MEDICINE

## 2019-03-08 RX ORDER — SILDENAFIL 100 MG/1
100 TABLET, FILM COATED ORAL DAILY PRN
COMMUNITY
End: 2019-03-08

## 2019-03-08 RX ORDER — SILDENAFIL 100 MG/1
100 TABLET, FILM COATED ORAL DAILY PRN
Qty: 12 TABLET | Refills: 1 | Status: ON HOLD | OUTPATIENT
Start: 2019-03-08 | End: 2021-01-14

## 2019-03-08 ASSESSMENT — ENCOUNTER SYMPTOMS
ABDOMINAL PAIN: 0
CONSTIPATION: 0
FEVER: 0
HEMATURIA: 0
COUGH: 0
EYE PAIN: 0
HEMATOCHEZIA: 0
NERVOUS/ANXIOUS: 0
DIARRHEA: 0
DIZZINESS: 0
CHILLS: 0

## 2019-03-08 ASSESSMENT — MIFFLIN-ST. JEOR: SCORE: 1978.34
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
## 2019-03-08 NOTE — LETTER
"March 14, 2019      Stveie Dotson  [address]  [place] [facility] [address]        Dear ,    We are writing to inform you of your test results. The results of your recent total cholesterol test were elevated.  Your total cholesterol should be less than 200.     The primary goal of therapy is the LDL or \"bad\" cholesterol.  Your LDL level was elevated.  Your LDL goal based on risk factors (i.e. medical history, smoking, family history, high blood pressure, low HDL cholesterol) and age is less than 130.     Your HDL, or \"good\" cholesterol is normal.  Your goal is an HDL level above 40.     Triglycerides are another cholesterol component that is associated with heart disease.  Normal triglycerides are less than 150.  Your triglyceride level is normal.     I would recommend: increase daily fiber intake, weight loss, increase physical activity with a goal of 150 minutes moderate exercise weekly and decrease saturated fat intake to less than 7% of total calories.  With your current cholesterol levels national guidelines would recommend that you consider cholesterol medication.  I would have you consider taking Lipitor 20 mg daily.  If you would like to start medication then call and let us know or follow-up in clinic to further discuss.  If you would like to continue to work on diet and activity I would recommend a recheck in 6-12 months after dietary and activity change.     Your fasting blood sugar (glucose) level was high.  A blood sugar level between 100-125 is elevated and is considered to be \"pre-diabetes.\"  This is related to having a poor diet with too many sugary foods and carbohydrates found in foods such as pasta, bread, and potatoes.  It can also reflect low levels of activity.  I would recommend increasing your activity to 30 minutes most days of the week and improving your diet.  This should be checked in 1 year to make sure you are not progressing to diabetes.  We want you to make sure you " "are getting exercise every day and have a diet that is low in added sugar.     Your complete metabolic panel indicates normal kidney function, normal electrolyte levels (sodium, potassium, and calcium levels are normal), and normal liver function (ALT, AST).     Your bilirubin level was mildly elevated.  This can be related to liver issues but when it is the only abnormal liver test and the others are normal it is usually a benign genetic condition called Gilbert syndrome.  If you have never been told that you have an elevated bilirubin level previously then I would recommend we recheck this level in 3 months just to make sure it is Gilbert syndrome and no other issues are present.  You can just come in for a lab-only appointment at that time.     What lifestyle changes can I make to help improve my cholesterol levels?     Exercise regularly.   Exercise can raise HDL cholesterol levels and reduce levels of LDL cholesterol and triglycerides. If you haven't been exercising, try to work up to 30 minutes, 4 to 6 times a week.     Lose weight if you are overweight.   Being overweight can raise your cholesterol levels. Losing weight, even just 5 or 10 pounds, can lower your total cholesterol, LDL cholesterol and triglyceride levels.     Eat a heart-healthy diet.   Eat plenty of fresh fruits and vegetables. Fruits and vegetables are naturally low in fat. Not only do they add flavor and variety to your diet, but they are also the best source of fiber, vitamins and minerals for your body. Aim for 5 cups of fruits and vegetables every day, not counting potatoes, corn and rice. Potatoes, corn and rice count as carbohydrates.     Pick  good  fats over  bad  fats. Fat is part of a healthy diet, but you need to know the difference between  bad  fats and  good  fats. \"Bad  fats, such as saturated and trans fats, are found in foods such as butter; coconut and palm oil; saturated or partially hydrogenated vegetable fats such as " shortening and margarine; animal fats in meats; and fats in whole milk dairy products. Limit the amount of saturated fat in your diet, and avoid trans fat completely. Unsaturated fat is the  good  fat. Most fats in fish, vegetables, grains and tree nuts are unsaturated. Try to eat unsaturated fat in place of saturated fat. For example, you can use olive oil or canola oil in cooking instead of butter.     Use healthier cooking methods. Baking, broiling and roasting are the healthiest ways to prepare meat, poultry and other foods. Trim any outside fat or skin before cooking. Lean cuts can be pan-broiled or stir-fried. Use either a nonstick pan or nonstick cooking spray instead of adding fats such as butter or margarine. When eating out, ask how food is prepared. You can request that your food be baked, broiled or roasted, rather than fried.     Look for other sources of protein. Fish, dry beans, tree nuts, peas and lentils offer protein, nutrients and fiber without the cholesterol and saturated fats that meats have. Consider eating one  meatless  meal each week. Try substituting beans for meat in a favorite recipe, such as lasagna or chili. Snack on a handful of almonds or pecans. Soy is also an excellent source of protein. Good examples of soy include soy milk, edamame (green soy beans), tofu and soy protein shakes.     Get more fiber in your diet. Add good sources of fiber to your meals. Examples include fruits, vegetables, whole grains (such as oat bran, whole and rolled oats and barley), legumes (such as beans and peas) and nuts and seeds (such as ground flax seed). In addition to fiber, whole grains supply B-vitamins and important nutrients not found in foods made with white flour.     Eat more fish. Fish are an excellent source of omega-3 fatty acids. Wild-caught oily fish, such as salmon, tuna, mackerel and sardines, are the best sources of omega-3s, but all fish contain some amount of this beneficial fatty  acid. Aim for 2 6-oz servings each week.     Resulted Orders   Lipid panel reflex to direct LDL Fasting   Result Value Ref Range    Cholesterol 232 (H) <200 mg/dL      Comment:      Desirable:       <200 mg/dl    Triglycerides 138 <150 mg/dL      Comment:      Fasting specimen    HDL Cholesterol 41 >39 mg/dL    LDL Cholesterol Calculated 163 (H) <100 mg/dL      Comment:      Above desirable:  100-129 mg/dl  Borderline High:  130-159 mg/dL  High:             160-189 mg/dL  Very high:       >189 mg/dl      Non HDL Cholesterol 191 (H) <130 mg/dL      Comment:      Above Desirable:  130-159 mg/dl  Borderline high:  160-189 mg/dl  High:             190-219 mg/dl  Very high:       >219 mg/dl     Comprehensive metabolic panel   Result Value Ref Range    Sodium 136 133 - 144 mmol/L    Potassium 4.2 3.4 - 5.3 mmol/L    Chloride 103 94 - 109 mmol/L    Carbon Dioxide 27 20 - 32 mmol/L    Anion Gap 6 3 - 14 mmol/L    Glucose 123 (H) 70 - 99 mg/dL      Comment:      Fasting specimen    Urea Nitrogen 15 7 - 30 mg/dL    Creatinine 1.08 0.66 - 1.25 mg/dL    GFR Estimate 72 >60 mL/min/[1.73_m2]      Comment:      Non  GFR Calc  Starting 12/18/2018, serum creatinine based estimated GFR (eGFR) will be   calculated using the Chronic Kidney Disease Epidemiology Collaboration   (CKD-EPI) equation.      GFR Estimate If Black 84 >60 mL/min/[1.73_m2]      Comment:       GFR Calc  Starting 12/18/2018, serum creatinine based estimated GFR (eGFR) will be   calculated using the Chronic Kidney Disease Epidemiology Collaboration   (CKD-EPI) equation.      Calcium 9.2 8.5 - 10.1 mg/dL    Bilirubin Total 1.6 (H) 0.2 - 1.3 mg/dL    Albumin 4.0 3.4 - 5.0 g/dL    Protein Total 7.7 6.8 - 8.8 g/dL    Alkaline Phosphatase 61 40 - 150 U/L    ALT 38 0 - 70 U/L    AST 18 0 - 45 U/L       If you have any questions or concerns, please call the clinic at the number listed above.       Sincerely,          Agusto Holguin,  MD

## 2019-03-08 NOTE — LETTER
"March 14, 2019      Stevie Dotson  [address]  [place] [facility] [address]        Dear ,    We are writing to inform you of your test results.    The results of your recent total cholesterol test were elevated.  Your total cholesterol should be less than 200.    The primary goal of therapy is the LDL or \"bad\" cholesterol.  Your LDL level was elevated.  Your LDL goal based on risk factors (i.e. medical history, smoking, family history, high blood pressure, low HDL cholesterol) and age is less than 130.    Your HDL, or \"good\" cholesterol is normal.  Your goal is an HDL level above 40.    Triglycerides are another cholesterol component that is associated with heart disease.  Normal triglycerides are less than 150.  Your triglyceride level is normal.    I would recommend: increase daily fiber intake, weight loss, increase physical activity with a goal of 150 minutes moderate exercise weekly and decrease saturated fat intake to less than 7% of total calories.  With your current cholesterol levels national guidelines would recommend that you consider cholesterol medication.  I would have you consider taking Lipitor 20 mg daily.  If you would like to start medication then call and let us know or follow-up in clinic to further discuss.  If you would like to continue to work on diet and activity I would recommend a recheck in 6-12 months after dietary and activity change.    Your fasting blood sugar (glucose) level was high.  A blood sugar level between 100-125 is elevated and is considered to be \"pre-diabetes.\"  This is related to having a poor diet with too many sugary foods and carbohydrates found in foods such as pasta, bread, and potatoes.  It can also reflect low levels of activity.  I would recommend increasing your activity to 30 minutes most days of the week and improving your diet.  This should be checked in 1 year to make sure you are not progressing to diabetes.  We want you to make sure you " "are getting exercise every day and have a diet that is low in added sugar.    Your complete metabolic panel indicates normal kidney function, normal electrolyte levels (sodium, potassium, and calcium levels are normal), and normal liver function (ALT, AST).    Your bilirubin level was mildly elevated.  This can be related to liver issues but when it is the only abnormal liver test and the others are normal it is usually a benign genetic condition called Gilbert syndrome.  If you have never been told that you have an elevated bilirubin level previously then I would recommend we recheck this level in 3 months just to make sure it is Gilbert syndrome and no other issues are present.  You can just come in for a lab-only appointment at that time.    What lifestyle changes can I make to help improve my cholesterol levels?    Exercise regularly.  Exercise can raise HDL cholesterol levels and reduce levels of LDL cholesterol and triglycerides. If you haven't been exercising, try to work up to 30 minutes, 4 to 6 times a week.    Lose weight if you are overweight.  Being overweight can raise your cholesterol levels. Losing weight, even just 5 or 10 pounds, can lower your total cholesterol, LDL cholesterol and triglyceride levels.    Eat a heart-healthy diet.  Eat plenty of fresh fruits and vegetables. Fruits and vegetables are naturally low in fat. Not only do they add flavor and variety to your diet, but they are also the best source of fiber, vitamins and minerals for your body. Aim for 5 cups of fruits and vegetables every day, not counting potatoes, corn and rice. Potatoes, corn and rice count as carbohydrates.     Pick  good  fats over  bad  fats. Fat is part of a healthy diet, but you need to know the difference between  bad  fats and  good  fats. \"Bad  fats, such as saturated and trans fats, are found in foods such as butter; coconut and palm oil; saturated or partially hydrogenated vegetable fats such as shortening " and margarine; animal fats in meats; and fats in whole milk dairy products. Limit the amount of saturated fat in your diet, and avoid trans fat completely. Unsaturated fat is the  good  fat. Most fats in fish, vegetables, grains and tree nuts are unsaturated. Try to eat unsaturated fat in place of saturated fat. For example, you can use olive oil or canola oil in cooking instead of butter.     Use healthier cooking methods. Baking, broiling and roasting are the healthiest ways to prepare meat, poultry and other foods. Trim any outside fat or skin before cooking. Lean cuts can be pan-broiled or stir-fried. Use either a nonstick pan or nonstick cooking spray instead of adding fats such as butter or margarine. When eating out, ask how food is prepared. You can request that your food be baked, broiled or roasted, rather than fried.     Look for other sources of protein. Fish, dry beans, tree nuts, peas and lentils offer protein, nutrients and fiber without the cholesterol and saturated fats that meats have. Consider eating one  meatless  meal each week. Try substituting beans for meat in a favorite recipe, such as lasagna or chili. Snack on a handful of almonds or pecans. Soy is also an excellent source of protein. Good examples of soy include soy milk, edamame (green soy beans), tofu and soy protein shakes.     Get more fiber in your diet. Add good sources of fiber to your meals. Examples include fruits, vegetables, whole grains (such as oat bran, whole and rolled oats and barley), legumes (such as beans and peas) and nuts and seeds (such as ground flax seed). In addition to fiber, whole grains supply B-vitamins and important nutrients not found in foods made with white flour.     Eat more fish. Fish are an excellent source of omega-3 fatty acids. Wild-caught oily fish, such as salmon, tuna, mackerel and sardines, are the best sources of omega-3s, but all fish contain some amount of this beneficial fatty acid. Aim for  2 6-oz servings each week.     It was a pleasure to see you at your recent visit.  Please call if you have any questions.    Resulted Orders   Lipid panel reflex to direct LDL Fasting   Result Value Ref Range    Cholesterol 232 (H) <200 mg/dL      Comment:      Desirable:       <200 mg/dl    Triglycerides 138 <150 mg/dL      Comment:      Fasting specimen    HDL Cholesterol 41 >39 mg/dL    LDL Cholesterol Calculated 163 (H) <100 mg/dL      Comment:      Above desirable:  100-129 mg/dl  Borderline High:  130-159 mg/dL  High:             160-189 mg/dL  Very high:       >189 mg/dl      Non HDL Cholesterol 191 (H) <130 mg/dL      Comment:      Above Desirable:  130-159 mg/dl  Borderline high:  160-189 mg/dl  High:             190-219 mg/dl  Very high:       >219 mg/dl     Comprehensive metabolic panel   Result Value Ref Range    Sodium 136 133 - 144 mmol/L    Potassium 4.2 3.4 - 5.3 mmol/L    Chloride 103 94 - 109 mmol/L    Carbon Dioxide 27 20 - 32 mmol/L    Anion Gap 6 3 - 14 mmol/L    Glucose 123 (H) 70 - 99 mg/dL      Comment:      Fasting specimen    Urea Nitrogen 15 7 - 30 mg/dL    Creatinine 1.08 0.66 - 1.25 mg/dL    GFR Estimate 72 >60 mL/min/[1.73_m2]      Comment:      Non  GFR Calc  Starting 12/18/2018, serum creatinine based estimated GFR (eGFR) will be   calculated using the Chronic Kidney Disease Epidemiology Collaboration   (CKD-EPI) equation.      GFR Estimate If Black 84 >60 mL/min/[1.73_m2]      Comment:       GFR Calc  Starting 12/18/2018, serum creatinine based estimated GFR (eGFR) will be   calculated using the Chronic Kidney Disease Epidemiology Collaboration   (CKD-EPI) equation.      Calcium 9.2 8.5 - 10.1 mg/dL    Bilirubin Total 1.6 (H) 0.2 - 1.3 mg/dL    Albumin 4.0 3.4 - 5.0 g/dL    Protein Total 7.7 6.8 - 8.8 g/dL    Alkaline Phosphatase 61 40 - 150 U/L    ALT 38 0 - 70 U/L    AST 18 0 - 45 U/L       If you have any questions or concerns, please call the  clinic at the number listed above.       Sincerely,    Agusto Holguin MD/Mai Sanchez RN, BSN

## 2019-03-14 PROBLEM — E78.5 HYPERLIPIDEMIA LDL GOAL <130: Status: ACTIVE | Noted: 2019-03-14

## 2019-03-18 ENCOUNTER — HOSPITAL ENCOUNTER (OUTPATIENT)
Facility: CLINIC | Age: 64
Discharge: HOME | End: 2019-03-18
Attending: COLON & RECTAL SURGERY | Admitting: COLON & RECTAL SURGERY
Payer: COMMERCIAL

## 2019-03-18 VITALS
OXYGEN SATURATION: 94 % | DIASTOLIC BLOOD PRESSURE: 89 MMHG | SYSTOLIC BLOOD PRESSURE: 133 MMHG | RESPIRATION RATE: 16 BRPM | HEART RATE: 65 BPM

## 2019-03-18 LAB — COLONOSCOPY: NORMAL

## 2019-03-18 PROCEDURE — 88305 TISSUE EXAM BY PATHOLOGIST: CPT | Mod: 26 | Performed by: COLON & RECTAL SURGERY

## 2019-03-18 PROCEDURE — G0500 MOD SEDAT ENDO SERVICE >5YRS: HCPCS | Performed by: COLON & RECTAL SURGERY

## 2019-03-18 PROCEDURE — 88305 TISSUE EXAM BY PATHOLOGIST: CPT | Performed by: COLON & RECTAL SURGERY

## 2019-03-18 PROCEDURE — 45385 COLONOSCOPY W/LESION REMOVAL: CPT | Mod: PT | Performed by: COLON & RECTAL SURGERY

## 2019-03-18 PROCEDURE — 25000128 H RX IP 250 OP 636: Performed by: COLON & RECTAL SURGERY

## 2019-03-18 RX ORDER — ONDANSETRON 4 MG/1
4 TABLET, ORALLY DISINTEGRATING ORAL EVERY 6 HOURS PRN
Status: DISCONTINUED | OUTPATIENT
Start: 2019-03-18 | End: 2019-03-18 | Stop reason: HOSPADM

## 2019-03-18 RX ORDER — LIDOCAINE 40 MG/G
CREAM TOPICAL
Status: DISCONTINUED | OUTPATIENT
Start: 2019-03-18 | End: 2019-03-18 | Stop reason: HOSPADM

## 2019-03-18 RX ORDER — ONDANSETRON 2 MG/ML
4 INJECTION INTRAMUSCULAR; INTRAVENOUS EVERY 6 HOURS PRN
Status: DISCONTINUED | OUTPATIENT
Start: 2019-03-18 | End: 2019-03-18 | Stop reason: HOSPADM

## 2019-03-18 RX ORDER — FENTANYL CITRATE 50 UG/ML
INJECTION, SOLUTION INTRAMUSCULAR; INTRAVENOUS PRN
Status: DISCONTINUED | OUTPATIENT
Start: 2019-03-18 | End: 2019-03-18 | Stop reason: HOSPADM

## 2019-03-18 RX ORDER — FLUMAZENIL 0.1 MG/ML
0.2 INJECTION, SOLUTION INTRAVENOUS
Status: DISCONTINUED | OUTPATIENT
Start: 2019-03-18 | End: 2019-03-18 | Stop reason: HOSPADM

## 2019-03-18 RX ORDER — ONDANSETRON 2 MG/ML
4 INJECTION INTRAMUSCULAR; INTRAVENOUS
Status: DISCONTINUED | OUTPATIENT
Start: 2019-03-18 | End: 2019-03-18 | Stop reason: HOSPADM

## 2019-03-18 RX ORDER — NALOXONE HYDROCHLORIDE 0.4 MG/ML
.1-.4 INJECTION, SOLUTION INTRAMUSCULAR; INTRAVENOUS; SUBCUTANEOUS
Status: DISCONTINUED | OUTPATIENT
Start: 2019-03-18 | End: 2019-03-18 | Stop reason: HOSPADM

## 2019-03-18 NOTE — DISCHARGE INSTRUCTIONS
The patient has received a copy of the Provation  report the doctor has written and discharge instructions have been discussed with the patient and responsible adult.  All questions were addressed and answered prior to patient discharge.      Understanding Colon and Rectal Polyps     The colon has a smooth lining composed of millions of cells.     The colon (also called the large intestine) is a muscular tube that forms the last part of the digestive tract. It absorbs water and stores food waste. The colon is about 4 to 6 feet long. The rectum is the last 6 inches of the colon. The colon and rectum have a smooth lining composed of millions of cells. Changes in these cells can lead to growths in the colon that can become cancerous and should be removed.     When the Colon Lining Changes  Changes that occur in the cells that line the colon or rectum can lead to growths called polyps. Over a period of years, polyps can turn cancerous. Removing polyps early may prevent cancer from ever forming.      Polyps  Polyps are fleshy clumps of tissue that form on the lining of the colon or rectum. Small polyps are usually benign (not cancerous). However, over time, cells in a polyp can change and become cancerous. The larger a polyp grows, the more likely this is to happen. Also, certain types of polyps known as adenomatous polyps are considered premalignant. This means that they will almost always become cancerous if they re not removed.          Cancer  Almost all colorectal cancers start when polyp cells begin growing abnormally. As a cancerous tumor grows, it may involve more and more of the colon or rectum. In time, cancer can also grow beyond the colon or rectum and spread to nearby organs or to glands called lymph nodes. The cells can also travel to other parts of the body. This is known as metastasis. The earlier a cancerous tumor is removed, the better the chance of preventing its spread.        8568-2350 Anay  [employer], [address] [place] [address], [place] [address]. All rights reserved. This information is not intended as a substitute for professional medical care. Always follow your healthcare professional's instructions.    Eating a High-Fiber Diet  Fiber is what gives strength and structure to plants. Most grains, beans, vegetables, and fruits contain fiber. Foods rich in fiber are often low in calories and fat, and they fill you up more. They may also reduce your risks for certain health problems. To find out the amount of fiber in canned, packaged, or frozen foods, read the  Nutrition Facts  label. It tells you how much fiber is in a serving.      Types of Fiber and Their Benefits  There are two types of fiber: insoluble and soluble. They both aid digestion and help you maintain a healthy weight.  Insoluble fiber: This is found in whole grains, cereals, certain fruits and vegetables (such as apple skin, corn, and carrots). Insoluble fiber may prevent constipation and reduce the risk of certain types of cancer.   Soluble fiber: This type of fiber is in oats, beans, and certain fruits and vegetables (such as strawberries and peas). Soluble fiber can reduce cholesterol (which may help lower the risk of heart disease), and helps control blood sugar levels.  Look for High-Fiber Foods  Whole-grain breads and cereals: Try to eat 6-8 ounces a day. Include wheat and oat bran cereals, whole-wheat muffins or toast, and corn tortillas in your meals.  Fruits: Try to eat 2 cups a day. Apples, oranges, strawberries, pears, and bananas are good sources. (Note: Fruit juice is low in fiber.)  Vegetables: Try to eat 3 cups a day. Add asparagus, carrots, broccoli, peas, and corn to your meals.  Legumes (beans): One cup of cooked lentils gives you over 15 grams of fiber. Try navy beans, lentils, and chickpeas.  Seeds:  A small handful of seeds gives you about 3 grams of fiber. Try sunflower seeds.    Keep Track of Your Fiber  A healthy diet  includes 31 grams of fiber a day if you have a 2,000-calorie diet. Keep track of how much fiber you eat. Start by reading food labels. Then eat a variety of foods high in fiber. Ask your doctor about supplemental fiber products.            8589-2860 [employer], [address] [place] [address]. All rights reserved. This information is not intended as a substitute for professional medical care. Always follow your healthcare professional's instructions.  Understanding Diverticulosis and Diverticulitis     Pouches or diverticula usually occur in the lower part of the colon called the sigmoid.      Diverticulitis occurs when the pouches become inflamed.     The colon (large intestine) is the last part of the digestive tract. It absorbs water from stool and changes it from a liquid to a solid. In certain cases, small pouches called diverticula can form in the colon wall. This condition is called diverticulosis. The pouches can become infected. If this happens, it becomes a more serious problem called diverticulitis. These problems can be painful. But they can be managed.   Managing Your Condition  Diet changes or taking medications are often tried first. These may be enough to bring relief. If the case is bad, surgery may be done. You and your doctor can discuss the plan that is best for you.  If You Have Diverticulosis  Diet changes are often enough to control symptoms. The main changes are adding fiber (roughage) and drinking more water. Fiber absorbs water as it travels through your colon. This helps your stool stay soft and move smoothly. Water helps this process. If needed, you may be told to take over-the-counter stool softeners. To help relieve pain, antispasmodic medications may be prescribed.  If You Have Diverticulitis  Treatment depends on how bad your symptoms are.  For mild symptoms: You may be put on a liquid diet for a short time. You may also be prescribed antibiotics. If these two steps  relieve your symptoms, you may then be prescribed a high-fiber diet. If you still have symptoms, your doctor will discuss further treatment options with you.  For severe symptoms: You may need to be admitted to the hospital. There, you can be given IV antibiotics and fluids. Once symptoms are under control, the above treatments may be tried. If these don t control your condition, your doctor may discuss the option of having surgery with you.  [place] to Colon Health  Help keep your colon healthy with a diet that includes plenty of high-fiber fruits, vegetables, and whole grains. Drink plenty of liquids like water and juice. Your doctor may also recommend avoiding seeds and nuts.          9992-8461 [facility], [address] [place] [address], [place] [address]. All rights reserved. This information is not intended as a substitute for professional medical care. Always follow your healthcare professional's instructions.
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
## 2019-03-18 NOTE — OP NOTE
See Provation Note In Chart    Amy Fortune MD  [facility]  Office Phone # [phone]

## 2019-03-19 LAB — COPATH REPORT: NORMAL

## 2020-12-30 ENCOUNTER — ANESTHESIA (OUTPATIENT)
Dept: INTENSIVE CARE | Facility: CLINIC | Age: 65
DRG: 871 | End: 2020-12-30
Payer: COMMERCIAL

## 2020-12-30 ENCOUNTER — APPOINTMENT (OUTPATIENT)
Dept: GENERAL RADIOLOGY | Facility: CLINIC | Age: 65
End: 2020-12-30
Attending: EMERGENCY MEDICINE
Payer: COMMERCIAL

## 2020-12-30 ENCOUNTER — APPOINTMENT (OUTPATIENT)
Dept: CT IMAGING | Facility: CLINIC | Age: 65
End: 2020-12-30
Attending: EMERGENCY MEDICINE
Payer: COMMERCIAL

## 2020-12-30 ENCOUNTER — APPOINTMENT (OUTPATIENT)
Dept: MRI IMAGING | Facility: CLINIC | Age: 65
DRG: 871 | End: 2020-12-30
Attending: NURSE PRACTITIONER
Payer: COMMERCIAL

## 2020-12-30 ENCOUNTER — ANESTHESIA EVENT (OUTPATIENT)
Dept: INTENSIVE CARE | Facility: CLINIC | Age: 65
DRG: 871 | End: 2020-12-30
Payer: COMMERCIAL

## 2020-12-30 ENCOUNTER — APPOINTMENT (OUTPATIENT)
Dept: GENERAL RADIOLOGY | Facility: CLINIC | Age: 65
DRG: 871 | End: 2020-12-30
Attending: NURSE PRACTITIONER
Payer: COMMERCIAL

## 2020-12-30 ENCOUNTER — HOSPITAL ENCOUNTER (INPATIENT)
Facility: CLINIC | Age: 65
LOS: 8 days | Discharge: NURSING, REHAB OR LONG-TERM CARE | DRG: 871 | End: 2021-01-07
Attending: EMERGENCY MEDICINE | Admitting: INTERNAL MEDICINE
Payer: COMMERCIAL

## 2020-12-30 ENCOUNTER — HOSPITAL ENCOUNTER (OUTPATIENT)
Dept: NEUROLOGY | Facility: CLINIC | Age: 65
DRG: 871 | End: 2020-12-30
Attending: PSYCHIATRY & NEUROLOGY
Payer: COMMERCIAL

## 2020-12-30 ENCOUNTER — HOSPITAL ENCOUNTER (EMERGENCY)
Facility: CLINIC | Age: 65
Discharge: ANOTHER HOSPITAL | End: 2020-12-30
Attending: EMERGENCY MEDICINE | Admitting: EMERGENCY MEDICINE
Payer: COMMERCIAL

## 2020-12-30 VITALS
OXYGEN SATURATION: 97 % | TEMPERATURE: 103.4 F | BODY MASS INDEX: 32.98 KG/M2 | WEIGHT: 250 LBS | RESPIRATION RATE: 21 BRPM | SYSTOLIC BLOOD PRESSURE: 115 MMHG | DIASTOLIC BLOOD PRESSURE: 91 MMHG | HEART RATE: 118 BPM

## 2020-12-30 DIAGNOSIS — G04.90 ENCEPHALITIS: ICD-10-CM

## 2020-12-30 DIAGNOSIS — G03.9 MENINGITIS: ICD-10-CM

## 2020-12-30 DIAGNOSIS — R53.1 LEFT-SIDED WEAKNESS: ICD-10-CM

## 2020-12-30 DIAGNOSIS — R41.82 ALTERED MENTAL STATUS, UNSPECIFIED ALTERED MENTAL STATUS TYPE: ICD-10-CM

## 2020-12-30 LAB
ALBUMIN SERPL-MCNC: 3.3 G/DL (ref 3.4–5)
ALBUMIN UR-MCNC: 50 MG/DL
ALP SERPL-CCNC: 78 U/L (ref 40–150)
ALT SERPL W P-5'-P-CCNC: 70 U/L (ref 0–70)
ANION GAP SERPL CALCULATED.3IONS-SCNC: 5 MMOL/L (ref 3–14)
APPEARANCE CSF: ABNORMAL
APPEARANCE UR: CLEAR
AST SERPL W P-5'-P-CCNC: 38 U/L (ref 0–45)
BASE DEFICIT BLDA-SCNC: 1.7 MMOL/L
BASE EXCESS BLDV CALC-SCNC: 1.9 MMOL/L
BASOPHILS # BLD AUTO: 0.1 10E9/L (ref 0–0.2)
BASOPHILS NFR BLD AUTO: 0.2 %
BILIRUB SERPL-MCNC: 1.8 MG/DL (ref 0.2–1.3)
BILIRUB UR QL STRIP: NEGATIVE
BUN SERPL-MCNC: 17 MG/DL (ref 7–30)
CALCIUM SERPL-MCNC: 9.2 MG/DL (ref 8.5–10.1)
CHLORIDE SERPL-SCNC: 104 MMOL/L (ref 94–109)
CO2 SERPL-SCNC: 29 MMOL/L (ref 20–32)
COLOR CSF: YELLOW
COLOR UR AUTO: YELLOW
CREAT SERPL-MCNC: 0.89 MG/DL (ref 0.66–1.25)
DIFFERENTIAL METHOD BLD: ABNORMAL
EOSINOPHIL # BLD AUTO: 0 10E9/L (ref 0–0.7)
EOSINOPHIL NFR BLD AUTO: 0 %
ERYTHROCYTE [DISTWIDTH] IN BLOOD BY AUTOMATED COUNT: 14 % (ref 10–15)
ETHANOL SERPL-MCNC: <0.01 G/DL
FLUABV+SARS-COV-2+RSV PNL RESP NAA+PROBE: NEGATIVE
FLUABV+SARS-COV-2+RSV PNL RESP NAA+PROBE: NEGATIVE
GFR SERPL CREATININE-BSD FRML MDRD: 90 ML/MIN/{1.73_M2}
GLUCOSE BLDC GLUCOMTR-MCNC: 217 MG/DL (ref 70–99)
GLUCOSE CSF-MCNC: 3 MG/DL (ref 40–70)
GLUCOSE SERPL-MCNC: 224 MG/DL (ref 70–99)
GLUCOSE UR STRIP-MCNC: 500 MG/DL
GRAM STN SPEC: NORMAL
HBA1C MFR BLD: 6.2 % (ref 0–5.6)
HCO3 BLD-SCNC: 22 MMOL/L (ref 21–28)
HCO3 BLDV-SCNC: 28 MMOL/L (ref 21–28)
HCT VFR BLD AUTO: 47.6 % (ref 40–53)
HGB BLD-MCNC: 15.7 G/DL (ref 13.3–17.7)
HGB UR QL STRIP: NEGATIVE
IMM GRANULOCYTES # BLD: 0.4 10E9/L (ref 0–0.4)
IMM GRANULOCYTES NFR BLD: 1.2 %
INTERPRETATION ECG - MUSE: NORMAL
KETONES UR STRIP-MCNC: 20 MG/DL
LABORATORY COMMENT REPORT: NORMAL
LACTATE BLD-SCNC: 1.8 MMOL/L (ref 0.7–2)
LACTATE BLD-SCNC: 2.9 MMOL/L (ref 0.7–2)
LEUKOCYTE ESTERASE UR QL STRIP: NEGATIVE
LYMPH ABN NFR CSF MANUAL: 1 %
LYMPHOCYTES # BLD AUTO: 0.7 10E9/L (ref 0.8–5.3)
LYMPHOCYTES NFR BLD AUTO: 2.5 %
MCH RBC QN AUTO: 30.7 PG (ref 26.5–33)
MCHC RBC AUTO-ENTMCNC: 33 G/DL (ref 31.5–36.5)
MCV RBC AUTO: 93 FL (ref 78–100)
MONOCYTES # BLD AUTO: 1.9 10E9/L (ref 0–1.3)
MONOCYTES NFR BLD AUTO: 6.5 %
MONOS+MACROS NFR CSF MANUAL: 11 %
MUCOUS THREADS #/AREA URNS LPF: PRESENT /LPF
NEUTROPHILS # BLD AUTO: 25.8 10E9/L (ref 1.6–8.3)
NEUTROPHILS NFR BLD AUTO: 89.6 %
NEUTROPHILS NFR CSF MANUAL: 88 %
NITRATE UR QL: NEGATIVE
NRBC # BLD AUTO: 0 10*3/UL
NRBC BLD AUTO-RTO: 0 /100
O2/TOTAL GAS SETTING VFR VENT: ABNORMAL %
OXYHGB MFR BLD: 97 % (ref 92–100)
OXYHGB MFR BLDV: 36 %
PCO2 BLD: 35 MM HG (ref 35–45)
PCO2 BLDV: 47 MM HG (ref 40–50)
PH BLD: 7.41 PH (ref 7.35–7.45)
PH BLDV: 7.38 PH (ref 7.32–7.43)
PH UR STRIP: 6 PH (ref 5–7)
PLATELET # BLD AUTO: 329 10E9/L (ref 150–450)
PO2 BLD: 93 MM HG (ref 80–105)
PO2 BLDV: 22 MM HG (ref 25–47)
POTASSIUM SERPL-SCNC: 3.8 MMOL/L (ref 3.4–5.3)
PROT CSF-MCNC: 722 MG/DL (ref 15–60)
PROT SERPL-MCNC: 8.3 G/DL (ref 6.8–8.8)
RBC # BLD AUTO: 5.12 10E12/L (ref 4.4–5.9)
RBC # CSF MANUAL: 86 /UL (ref 0–2)
RBC #/AREA URNS AUTO: 2 /HPF (ref 0–2)
RSV RNA SPEC QL NAA+PROBE: NORMAL
SARS-COV-2 RNA SPEC QL NAA+PROBE: NEGATIVE
SODIUM SERPL-SCNC: 138 MMOL/L (ref 133–144)
SOURCE: ABNORMAL
SP GR UR STRIP: 1.03 (ref 1–1.03)
SPECIMEN SOURCE: NORMAL
SPECIMEN SOURCE: NORMAL
TROPONIN I SERPL-MCNC: <0.015 UG/L (ref 0–0.04)
TUBE # CSF: 4 #
UROBILINOGEN UR STRIP-MCNC: NORMAL MG/DL (ref 0–2)
WBC # BLD AUTO: 28.8 10E9/L (ref 4–11)
WBC # CSF MANUAL: 5085 /UL (ref 0–5)
WBC #/AREA URNS AUTO: 2 /HPF (ref 0–5)

## 2020-12-30 PROCEDURE — 96375 TX/PRO/DX INJ NEW DRUG ADDON: CPT

## 2020-12-30 PROCEDURE — 87449 NOS EACH ORGANISM AG IA: CPT | Performed by: INTERNAL MEDICINE

## 2020-12-30 PROCEDURE — 87077 CULTURE AEROBIC IDENTIFY: CPT | Performed by: EMERGENCY MEDICINE

## 2020-12-30 PROCEDURE — 250N000011 HC RX IP 250 OP 636: Performed by: EMERGENCY MEDICINE

## 2020-12-30 PROCEDURE — 89051 BODY FLUID CELL COUNT: CPT | Performed by: EMERGENCY MEDICINE

## 2020-12-30 PROCEDURE — 86612 BLASTOMYCES ANTIBODY: CPT | Performed by: INTERNAL MEDICINE

## 2020-12-30 PROCEDURE — 258N000003 HC RX IP 258 OP 636: Performed by: STUDENT IN AN ORGANIZED HEALTH CARE EDUCATION/TRAINING PROGRAM

## 2020-12-30 PROCEDURE — 999N000157 HC STATISTIC RCP TIME EA 10 MIN

## 2020-12-30 PROCEDURE — 36415 COLL VENOUS BLD VENIPUNCTURE: CPT | Performed by: INTERNAL MEDICINE

## 2020-12-30 PROCEDURE — 258N000003 HC RX IP 258 OP 636: Performed by: EMERGENCY MEDICINE

## 2020-12-30 PROCEDURE — 999N000128 HC STATISTIC PERIPHERAL IV START W/O US GUIDANCE

## 2020-12-30 PROCEDURE — 94002 VENT MGMT INPAT INIT DAY: CPT

## 2020-12-30 PROCEDURE — 87040 BLOOD CULTURE FOR BACTERIA: CPT | Performed by: PSYCHIATRY & NEUROLOGY

## 2020-12-30 PROCEDURE — 70544 MR ANGIOGRAPHY HEAD W/O DYE: CPT

## 2020-12-30 PROCEDURE — 87086 URINE CULTURE/COLONY COUNT: CPT | Performed by: EMERGENCY MEDICINE

## 2020-12-30 PROCEDURE — 87181 SC STD AGAR DILUTION PER AGT: CPT | Mod: 91 | Performed by: EMERGENCY MEDICINE

## 2020-12-30 PROCEDURE — 36600 WITHDRAWAL OF ARTERIAL BLOOD: CPT

## 2020-12-30 PROCEDURE — 87181 SC STD AGAR DILUTION PER AGT: CPT | Performed by: EMERGENCY MEDICINE

## 2020-12-30 PROCEDURE — 250N000011 HC RX IP 250 OP 636: Performed by: NURSE PRACTITIONER

## 2020-12-30 PROCEDURE — 87040 BLOOD CULTURE FOR BACTERIA: CPT | Performed by: EMERGENCY MEDICINE

## 2020-12-30 PROCEDURE — G0509 CRIT CARE TELEHEA CONSULT 50: HCPCS | Mod: GC | Performed by: PSYCHIATRY & NEUROLOGY

## 2020-12-30 PROCEDURE — 96365 THER/PROPH/DIAG IV INF INIT: CPT | Mod: 59

## 2020-12-30 PROCEDURE — 86606 ASPERGILLUS ANTIBODY: CPT | Performed by: INTERNAL MEDICINE

## 2020-12-30 PROCEDURE — 258N000003 HC RX IP 258 OP 636: Performed by: INTERNAL MEDICINE

## 2020-12-30 PROCEDURE — 99291 CRITICAL CARE FIRST HOUR: CPT | Mod: 25 | Performed by: NURSE PRACTITIONER

## 2020-12-30 PROCEDURE — 99285 EMERGENCY DEPT VISIT HI MDM: CPT | Mod: 25

## 2020-12-30 PROCEDURE — 99292 CRITICAL CARE ADDL 30 MIN: CPT

## 2020-12-30 PROCEDURE — 81001 URINALYSIS AUTO W/SCOPE: CPT | Performed by: EMERGENCY MEDICINE

## 2020-12-30 PROCEDURE — 999N000011 HC STATISTIC ANESTHESIA CASE

## 2020-12-30 PROCEDURE — 83605 ASSAY OF LACTIC ACID: CPT | Performed by: EMERGENCY MEDICINE

## 2020-12-30 PROCEDURE — 250N000009 HC RX 250: Performed by: EMERGENCY MEDICINE

## 2020-12-30 PROCEDURE — 99291 CRITICAL CARE FIRST HOUR: CPT | Mod: 25

## 2020-12-30 PROCEDURE — 84484 ASSAY OF TROPONIN QUANT: CPT | Performed by: EMERGENCY MEDICINE

## 2020-12-30 PROCEDURE — 87800 DETECT AGNT MULT DNA DIREC: CPT | Performed by: EMERGENCY MEDICINE

## 2020-12-30 PROCEDURE — 87529 HSV DNA AMP PROBE: CPT | Performed by: EMERGENCY MEDICINE

## 2020-12-30 PROCEDURE — 92950 HEART/LUNG RESUSCITATION CPR: CPT | Performed by: NURSE PRACTITIONER

## 2020-12-30 PROCEDURE — 250N000011 HC RX IP 250 OP 636: Performed by: NURSE ANESTHETIST, CERTIFIED REGISTERED

## 2020-12-30 PROCEDURE — 82805 BLOOD GASES W/O2 SATURATION: CPT | Performed by: EMERGENCY MEDICINE

## 2020-12-30 PROCEDURE — 36415 COLL VENOUS BLD VENIPUNCTURE: CPT | Performed by: PSYCHIATRY & NEUROLOGY

## 2020-12-30 PROCEDURE — 87205 SMEAR GRAM STAIN: CPT | Performed by: EMERGENCY MEDICINE

## 2020-12-30 PROCEDURE — 87305 ASPERGILLUS AG IA: CPT | Performed by: INTERNAL MEDICINE

## 2020-12-30 PROCEDURE — 82945 GLUCOSE OTHER FLUID: CPT | Performed by: EMERGENCY MEDICINE

## 2020-12-30 PROCEDURE — 95816 EEG AWAKE AND DROWSY: CPT

## 2020-12-30 PROCEDURE — 87070 CULTURE OTHR SPECIMN AEROBIC: CPT | Mod: XS | Performed by: EMERGENCY MEDICINE

## 2020-12-30 PROCEDURE — 5A1945Z RESPIRATORY VENTILATION, 24-96 CONSECUTIVE HOURS: ICD-10-PCS | Performed by: INTERNAL MEDICINE

## 2020-12-30 PROCEDURE — 999N000065 XR CHEST PORT 1 VW

## 2020-12-30 PROCEDURE — 71045 X-RAY EXAM CHEST 1 VIEW: CPT

## 2020-12-30 PROCEDURE — 31500 INSERT EMERGENCY AIRWAY: CPT

## 2020-12-30 PROCEDURE — 87636 SARSCOV2 & INF A&B AMP PRB: CPT | Performed by: EMERGENCY MEDICINE

## 2020-12-30 PROCEDURE — 85025 COMPLETE CBC W/AUTO DIFF WBC: CPT | Performed by: EMERGENCY MEDICINE

## 2020-12-30 PROCEDURE — 258N000003 HC RX IP 258 OP 636: Performed by: PSYCHIATRY & NEUROLOGY

## 2020-12-30 PROCEDURE — 96365 THER/PROPH/DIAG IV INF INIT: CPT

## 2020-12-30 PROCEDURE — 250N000012 HC RX MED GY IP 250 OP 636 PS 637: Performed by: INTERNAL MEDICINE

## 2020-12-30 PROCEDURE — 999N001017 HC STATISTIC GLUCOSE BY METER IP

## 2020-12-30 PROCEDURE — 70553 MRI BRAIN STEM W/O & W/DYE: CPT

## 2020-12-30 PROCEDURE — 80053 COMPREHEN METABOLIC PANEL: CPT | Performed by: EMERGENCY MEDICINE

## 2020-12-30 PROCEDURE — 70498 CT ANGIOGRAPHY NECK: CPT

## 2020-12-30 PROCEDURE — 250N000013 HC RX MED GY IP 250 OP 250 PS 637: Performed by: NURSE PRACTITIONER

## 2020-12-30 PROCEDURE — 0042T CT HEAD PERFUSION WITH CONTRAST: CPT

## 2020-12-30 PROCEDURE — 96375 TX/PRO/DX INJ NEW DRUG ADDON: CPT | Mod: 59

## 2020-12-30 PROCEDURE — 36415 COLL VENOUS BLD VENIPUNCTURE: CPT

## 2020-12-30 PROCEDURE — 250N000011 HC RX IP 250 OP 636: Performed by: INTERNAL MEDICINE

## 2020-12-30 PROCEDURE — 83036 HEMOGLOBIN GLYCOSYLATED A1C: CPT | Performed by: INTERNAL MEDICINE

## 2020-12-30 PROCEDURE — 80320 DRUG SCREEN QUANTALCOHOLS: CPT | Performed by: EMERGENCY MEDICINE

## 2020-12-30 PROCEDURE — 86698 HISTOPLASMA ANTIBODY: CPT | Performed by: INTERNAL MEDICINE

## 2020-12-30 PROCEDURE — 62270 DX LMBR SPI PNXR: CPT

## 2020-12-30 PROCEDURE — 70549 MR ANGIOGRAPH NECK W/O&W/DYE: CPT

## 2020-12-30 PROCEDURE — 70450 CT HEAD/BRAIN W/O DYE: CPT

## 2020-12-30 PROCEDURE — 96366 THER/PROPH/DIAG IV INF ADDON: CPT

## 2020-12-30 PROCEDURE — 255N000002 HC RX 255 OP 636: Performed by: EMERGENCY MEDICINE

## 2020-12-30 PROCEDURE — 93005 ELECTROCARDIOGRAM TRACING: CPT

## 2020-12-30 PROCEDURE — 250N000011 HC RX IP 250 OP 636

## 2020-12-30 PROCEDURE — 84157 ASSAY OF PROTEIN OTHER: CPT | Performed by: EMERGENCY MEDICINE

## 2020-12-30 PROCEDURE — 250N000013 HC RX MED GY IP 250 OP 250 PS 637: Performed by: EMERGENCY MEDICINE

## 2020-12-30 PROCEDURE — 250N000011 HC RX IP 250 OP 636: Performed by: PSYCHIATRY & NEUROLOGY

## 2020-12-30 PROCEDURE — C9803 HOPD COVID-19 SPEC COLLECT: HCPCS

## 2020-12-30 PROCEDURE — 200N000001 HC R&B ICU

## 2020-12-30 PROCEDURE — 99291 CRITICAL CARE FIRST HOUR: CPT | Performed by: INTERNAL MEDICINE

## 2020-12-30 PROCEDURE — A9585 GADOBUTROL INJECTION: HCPCS | Performed by: EMERGENCY MEDICINE

## 2020-12-30 PROCEDURE — 250N000011 HC RX IP 250 OP 636: Performed by: STUDENT IN AN ORGANIZED HEALTH CARE EDUCATION/TRAINING PROGRAM

## 2020-12-30 PROCEDURE — 250N000013 HC RX MED GY IP 250 OP 250 PS 637: Performed by: INTERNAL MEDICINE

## 2020-12-30 PROCEDURE — 99223 1ST HOSP IP/OBS HIGH 75: CPT | Mod: AI | Performed by: INTERNAL MEDICINE

## 2020-12-30 PROCEDURE — 82805 BLOOD GASES W/O2 SATURATION: CPT | Performed by: NURSE PRACTITIONER

## 2020-12-30 RX ORDER — NICOTINE POLACRILEX 4 MG
15-30 LOZENGE BUCCAL
Status: DISCONTINUED | OUTPATIENT
Start: 2020-12-30 | End: 2021-01-06

## 2020-12-30 RX ORDER — LIDOCAINE 40 MG/G
CREAM TOPICAL
Status: DISCONTINUED | OUTPATIENT
Start: 2020-12-30 | End: 2020-12-30

## 2020-12-30 RX ORDER — HEPARIN SODIUM 5000 [USP'U]/.5ML
5000 INJECTION, SOLUTION INTRAVENOUS; SUBCUTANEOUS EVERY 8 HOURS
Status: DISCONTINUED | OUTPATIENT
Start: 2020-12-30 | End: 2021-01-07 | Stop reason: HOSPADM

## 2020-12-30 RX ORDER — LORAZEPAM 2 MG/ML
1 INJECTION INTRAMUSCULAR ONCE
Status: COMPLETED | OUTPATIENT
Start: 2020-12-30 | End: 2020-12-30

## 2020-12-30 RX ORDER — CEFTRIAXONE 2 G/1
2 INJECTION, POWDER, FOR SOLUTION INTRAMUSCULAR; INTRAVENOUS EVERY 12 HOURS
Status: DISCONTINUED | OUTPATIENT
Start: 2020-12-30 | End: 2021-01-07 | Stop reason: HOSPADM

## 2020-12-30 RX ORDER — PROPOFOL 10 MG/ML
5-75 INJECTION, EMULSION INTRAVENOUS CONTINUOUS
Status: DISCONTINUED | OUTPATIENT
Start: 2020-12-30 | End: 2020-12-30 | Stop reason: DRUGHIGH

## 2020-12-30 RX ORDER — FENTANYL CITRATE 50 UG/ML
100 INJECTION, SOLUTION INTRAMUSCULAR; INTRAVENOUS ONCE
Status: COMPLETED | OUTPATIENT
Start: 2020-12-30 | End: 2020-12-30

## 2020-12-30 RX ORDER — NALOXONE HYDROCHLORIDE 0.4 MG/ML
0.4 INJECTION, SOLUTION INTRAMUSCULAR; INTRAVENOUS; SUBCUTANEOUS
Status: DISCONTINUED | OUTPATIENT
Start: 2020-12-30 | End: 2021-01-07 | Stop reason: HOSPADM

## 2020-12-30 RX ORDER — LIDOCAINE 40 MG/G
CREAM TOPICAL
Status: DISCONTINUED | OUTPATIENT
Start: 2020-12-30 | End: 2021-01-05

## 2020-12-30 RX ORDER — ACETAMINOPHEN 650 MG/1
975 SUPPOSITORY RECTAL ONCE
Status: COMPLETED | OUTPATIENT
Start: 2020-12-30 | End: 2020-12-30

## 2020-12-30 RX ORDER — DEXTROSE MONOHYDRATE 25 G/50ML
25-50 INJECTION, SOLUTION INTRAVENOUS
Status: DISCONTINUED | OUTPATIENT
Start: 2020-12-30 | End: 2020-12-30

## 2020-12-30 RX ORDER — IOPAMIDOL 755 MG/ML
120 INJECTION, SOLUTION INTRAVASCULAR ONCE
Status: DISCONTINUED | OUTPATIENT
Start: 2020-12-30 | End: 2021-01-05

## 2020-12-30 RX ORDER — AMPICILLIN 2 G/1
2 INJECTION, POWDER, FOR SOLUTION INTRAVENOUS ONCE
Status: COMPLETED | OUTPATIENT
Start: 2020-12-30 | End: 2020-12-30

## 2020-12-30 RX ORDER — PROPOFOL 10 MG/ML
10-20 INJECTION, EMULSION INTRAVENOUS EVERY 30 MIN PRN
Status: DISCONTINUED | OUTPATIENT
Start: 2020-12-30 | End: 2021-01-05

## 2020-12-30 RX ORDER — LEVETIRACETAM 10 MG/ML
1000 INJECTION INTRAVASCULAR EVERY 12 HOURS
Status: DISCONTINUED | OUTPATIENT
Start: 2020-12-30 | End: 2021-01-04

## 2020-12-30 RX ORDER — NALOXONE HYDROCHLORIDE 0.4 MG/ML
0.2 INJECTION, SOLUTION INTRAMUSCULAR; INTRAVENOUS; SUBCUTANEOUS
Status: DISCONTINUED | OUTPATIENT
Start: 2020-12-30 | End: 2021-01-07 | Stop reason: HOSPADM

## 2020-12-30 RX ORDER — HYDROMORPHONE HYDROCHLORIDE 1 MG/ML
.3-.5 INJECTION, SOLUTION INTRAMUSCULAR; INTRAVENOUS; SUBCUTANEOUS
Status: DISCONTINUED | OUTPATIENT
Start: 2020-12-30 | End: 2021-01-07 | Stop reason: HOSPADM

## 2020-12-30 RX ORDER — DEXAMETHASONE SODIUM PHOSPHATE 10 MG/ML
10 INJECTION, SOLUTION INTRAMUSCULAR; INTRAVENOUS ONCE
Status: COMPLETED | OUTPATIENT
Start: 2020-12-30 | End: 2020-12-30

## 2020-12-30 RX ORDER — SODIUM CHLORIDE, SODIUM LACTATE, POTASSIUM CHLORIDE, CALCIUM CHLORIDE 600; 310; 30; 20 MG/100ML; MG/100ML; MG/100ML; MG/100ML
INJECTION, SOLUTION INTRAVENOUS CONTINUOUS
Status: DISCONTINUED | OUTPATIENT
Start: 2020-12-30 | End: 2021-01-01

## 2020-12-30 RX ORDER — FENTANYL CITRATE 50 UG/ML
INJECTION, SOLUTION INTRAMUSCULAR; INTRAVENOUS
Status: COMPLETED
Start: 2020-12-30 | End: 2020-12-30

## 2020-12-30 RX ORDER — DEXTROSE MONOHYDRATE 25 G/50ML
25-50 INJECTION, SOLUTION INTRAVENOUS
Status: DISCONTINUED | OUTPATIENT
Start: 2020-12-30 | End: 2021-01-06

## 2020-12-30 RX ORDER — ACETAMINOPHEN 650 MG/1
650 SUPPOSITORY RECTAL EVERY 4 HOURS PRN
Status: DISCONTINUED | OUTPATIENT
Start: 2020-12-30 | End: 2021-01-07 | Stop reason: HOSPADM

## 2020-12-30 RX ORDER — FLUTICASONE PROPIONATE 50 MCG
1 SPRAY, SUSPENSION (ML) NASAL 2 TIMES DAILY
Status: ON HOLD | COMMUNITY
End: 2021-01-14

## 2020-12-30 RX ORDER — NICOTINE POLACRILEX 4 MG
15-30 LOZENGE BUCCAL
Status: DISCONTINUED | OUTPATIENT
Start: 2020-12-30 | End: 2020-12-30

## 2020-12-30 RX ORDER — GADOBUTROL 604.72 MG/ML
15 INJECTION INTRAVENOUS ONCE
Status: COMPLETED | OUTPATIENT
Start: 2020-12-30 | End: 2020-12-30

## 2020-12-30 RX ORDER — OXYMETAZOLINE HYDROCHLORIDE 0.05 G/100ML
2 SPRAY NASAL 2 TIMES DAILY
Status: ON HOLD | COMMUNITY
End: 2021-01-06

## 2020-12-30 RX ORDER — IOPAMIDOL 755 MG/ML
120 INJECTION, SOLUTION INTRAVASCULAR ONCE
Status: COMPLETED | OUTPATIENT
Start: 2020-12-30 | End: 2020-12-30

## 2020-12-30 RX ORDER — AMPICILLIN 2 G/1
2 INJECTION, POWDER, FOR SOLUTION INTRAVENOUS EVERY 4 HOURS
Status: DISCONTINUED | OUTPATIENT
Start: 2020-12-30 | End: 2020-12-31

## 2020-12-30 RX ORDER — DEXAMETHASONE SODIUM PHOSPHATE 4 MG/ML
10 INJECTION, SOLUTION INTRA-ARTICULAR; INTRALESIONAL; INTRAMUSCULAR; INTRAVENOUS; SOFT TISSUE EVERY 6 HOURS
Status: DISCONTINUED | OUTPATIENT
Start: 2020-12-30 | End: 2021-01-04

## 2020-12-30 RX ORDER — HYDROMORPHONE HYDROCHLORIDE 1 MG/ML
INJECTION, SOLUTION INTRAMUSCULAR; INTRAVENOUS; SUBCUTANEOUS
Status: COMPLETED
Start: 2020-12-30 | End: 2020-12-30

## 2020-12-30 RX ORDER — NITROGLYCERIN 0.4 MG/1
0.4 TABLET SUBLINGUAL EVERY 5 MIN PRN
Status: DISCONTINUED | OUTPATIENT
Start: 2020-12-30 | End: 2021-01-05

## 2020-12-30 RX ORDER — CEFTRIAXONE 2 G/1
2 INJECTION, POWDER, FOR SOLUTION INTRAMUSCULAR; INTRAVENOUS ONCE
Status: COMPLETED | OUTPATIENT
Start: 2020-12-30 | End: 2020-12-30

## 2020-12-30 RX ORDER — PROPOFOL 10 MG/ML
INJECTION, EMULSION INTRAVENOUS PRN
Status: DISCONTINUED | OUTPATIENT
Start: 2020-12-30 | End: 2020-12-30

## 2020-12-30 RX ORDER — CHLORHEXIDINE GLUCONATE ORAL RINSE 1.2 MG/ML
15 SOLUTION DENTAL EVERY 12 HOURS
Status: DISCONTINUED | OUTPATIENT
Start: 2020-12-30 | End: 2021-01-04

## 2020-12-30 RX ORDER — PROPOFOL 10 MG/ML
5-75 INJECTION, EMULSION INTRAVENOUS CONTINUOUS
Status: DISCONTINUED | OUTPATIENT
Start: 2020-12-30 | End: 2021-01-05

## 2020-12-30 RX ADMIN — PROPOFOL 50 MCG/KG/MIN: 10 INJECTION, EMULSION INTRAVENOUS at 16:46

## 2020-12-30 RX ADMIN — VANCOMYCIN HYDROCHLORIDE 2000 MG: 5 INJECTION, POWDER, LYOPHILIZED, FOR SOLUTION INTRAVENOUS at 22:11

## 2020-12-30 RX ADMIN — VANCOMYCIN HYDROCHLORIDE 2500 MG: 5 INJECTION, POWDER, LYOPHILIZED, FOR SOLUTION INTRAVENOUS at 11:30

## 2020-12-30 RX ADMIN — SODIUM CHLORIDE 2300 MG PE: 9 INJECTION, SOLUTION INTRAVENOUS at 14:58

## 2020-12-30 RX ADMIN — ACYCLOVIR SODIUM 800 MG: 50 INJECTION, SOLUTION INTRAVENOUS at 13:53

## 2020-12-30 RX ADMIN — DEXAMETHASONE SODIUM PHOSPHATE 10 MG: 10 INJECTION, SOLUTION INTRAMUSCULAR; INTRAVENOUS at 09:31

## 2020-12-30 RX ADMIN — CEFTRIAXONE 2 G: 2 INJECTION, POWDER, FOR SOLUTION INTRAMUSCULAR; INTRAVENOUS at 09:39

## 2020-12-30 RX ADMIN — IOPAMIDOL 120 ML: 755 INJECTION, SOLUTION INTRAVENOUS at 08:46

## 2020-12-30 RX ADMIN — LORAZEPAM 1 MG: 2 INJECTION INTRAMUSCULAR; INTRAVENOUS at 10:40

## 2020-12-30 RX ADMIN — PROPOFOL 50 MCG/KG/MIN: 10 INJECTION, EMULSION INTRAVENOUS at 23:56

## 2020-12-30 RX ADMIN — AMPICILLIN SODIUM 2 G: 2 INJECTION, POWDER, FOR SOLUTION INTRAVENOUS at 20:33

## 2020-12-30 RX ADMIN — AMPICILLIN SODIUM 2 G: 2 INJECTION, POWDER, FOR SOLUTION INTRAVENOUS at 17:21

## 2020-12-30 RX ADMIN — GADOBUTROL 15 ML: 604.72 INJECTION INTRAVENOUS at 12:12

## 2020-12-30 RX ADMIN — PROPOFOL 50 MCG/KG/MIN: 10 INJECTION, EMULSION INTRAVENOUS at 18:03

## 2020-12-30 RX ADMIN — SODIUM CHLORIDE, POTASSIUM CHLORIDE, SODIUM LACTATE AND CALCIUM CHLORIDE: 600; 310; 30; 20 INJECTION, SOLUTION INTRAVENOUS at 15:24

## 2020-12-30 RX ADMIN — LEVETIRACETAM 2000 MG: 100 INJECTION, SOLUTION INTRAVENOUS at 13:53

## 2020-12-30 RX ADMIN — SODIUM CHLORIDE 1000 ML: 9 INJECTION, SOLUTION INTRAVENOUS at 09:09

## 2020-12-30 RX ADMIN — HYDROMORPHONE HYDROCHLORIDE 0.5 MG: 1 INJECTION, SOLUTION INTRAMUSCULAR; INTRAVENOUS; SUBCUTANEOUS at 16:40

## 2020-12-30 RX ADMIN — PROPOFOL 100 MG: 10 INJECTION, EMULSION INTRAVENOUS at 15:39

## 2020-12-30 RX ADMIN — CEFTRIAXONE SODIUM 2 G: 2 INJECTION, POWDER, FOR SOLUTION INTRAMUSCULAR; INTRAVENOUS at 22:10

## 2020-12-30 RX ADMIN — PROPOFOL 100 MG: 10 INJECTION, EMULSION INTRAVENOUS at 15:36

## 2020-12-30 RX ADMIN — HEPARIN SODIUM 5000 UNITS: 5000 INJECTION, SOLUTION INTRAVENOUS; SUBCUTANEOUS at 20:33

## 2020-12-30 RX ADMIN — INSULIN ASPART 2 UNITS: 100 INJECTION, SOLUTION INTRAVENOUS; SUBCUTANEOUS at 22:21

## 2020-12-30 RX ADMIN — MIDAZOLAM HYDROCHLORIDE 2 MG: 1 INJECTION, SOLUTION INTRAMUSCULAR; INTRAVENOUS at 17:07

## 2020-12-30 RX ADMIN — AMPICILLIN SODIUM 2 G: 2 INJECTION, POWDER, FOR SOLUTION INTRAVENOUS at 23:56

## 2020-12-30 RX ADMIN — LEVETIRACETAM 1000 MG: 10 INJECTION INTRAVENOUS at 20:33

## 2020-12-30 RX ADMIN — PROPOFOL 25 MCG/KG/MIN: 10 INJECTION, EMULSION INTRAVENOUS at 15:51

## 2020-12-30 RX ADMIN — CHLORHEXIDINE GLUCONATE 0.12% ORAL RINSE 15 ML: 1.2 LIQUID ORAL at 20:34

## 2020-12-30 RX ADMIN — ACETAMINOPHEN 650 MG: 650 SUPPOSITORY RECTAL at 16:51

## 2020-12-30 RX ADMIN — SODIUM CHLORIDE 500 ML: 9 INJECTION, SOLUTION INTRAVENOUS at 17:22

## 2020-12-30 RX ADMIN — DEXAMETHASONE SODIUM PHOSPHATE 10 MG: 4 INJECTION, SOLUTION INTRAMUSCULAR; INTRAVENOUS at 16:48

## 2020-12-30 RX ADMIN — AMPICILLIN SODIUM 2 G: 2 INJECTION, POWDER, FOR SOLUTION INTRAVENOUS at 12:38

## 2020-12-30 RX ADMIN — SODIUM CHLORIDE 2300 MG PE: 9 INJECTION, SOLUTION INTRAVENOUS at 16:53

## 2020-12-30 RX ADMIN — FENTANYL CITRATE 100 MCG: 50 INJECTION, SOLUTION INTRAMUSCULAR; INTRAVENOUS at 15:48

## 2020-12-30 RX ADMIN — SODIUM CHLORIDE 95 ML: 9 INJECTION, SOLUTION INTRAVENOUS at 08:47

## 2020-12-30 RX ADMIN — SODIUM CHLORIDE 175 MG PE: 9 INJECTION, SOLUTION INTRAVENOUS at 22:10

## 2020-12-30 RX ADMIN — INSULIN ASPART 3 UNITS: 100 INJECTION, SOLUTION INTRAVENOUS; SUBCUTANEOUS at 17:21

## 2020-12-30 RX ADMIN — ACETAMINOPHEN 975 MG: 650 SUPPOSITORY RECTAL at 09:10

## 2020-12-30 RX ADMIN — PROPOFOL 50 MCG/KG/MIN: 10 INJECTION, EMULSION INTRAVENOUS at 21:43

## 2020-12-30 RX ADMIN — DEXAMETHASONE SODIUM PHOSPHATE 10 MG: 4 INJECTION, SOLUTION INTRAMUSCULAR; INTRAVENOUS at 22:10

## 2020-12-30 ASSESSMENT — ACTIVITIES OF DAILY LIVING (ADL)
ADLS_ACUITY_SCORE: 22
ADLS_ACUITY_SCORE: 18

## 2020-12-30 ASSESSMENT — MIFFLIN-ST. JEOR: SCORE: 1968.88

## 2020-12-30 ASSESSMENT — ENCOUNTER SYMPTOMS
FEVER: 1
CONFUSION: 1

## 2020-12-30 NOTE — PHARMACY-VANCOMYCIN DOSING SERVICE
Pharmacy Vancomycin Initial Note  Date of Service 2020  Patient's  1955  65 year old, male    Indication: Meningitis    Current estimated CrCl = Estimated Creatinine Clearance: 109 mL/min (based on SCr of 0.89 mg/dL).    Creatinine for last 3 days  2020:  8:30 AM Creatinine 0.89 mg/dL    Recent Vancomycin Level(s) for last 3 days  No results found for requested labs within last 72 hours.      Vancomycin IV Administrations (past 72 hours)                   vancomycin 2500 mg in 0.9% NaCl 500 ml intermittent infusion 2,500 mg (mg) 2,500 mg Given 20 1130                Nephrotoxins and other renal medications (From now, onward)    Start     Dose/Rate Route Frequency Ordered Stop    20 2200  vancomycin 2000 mg in 0.9% NaCl 500 ml intermittent infusion 2,000 mg      2,000 mg  over 90 Minutes Intravenous EVERY 12 HOURS 20 1337      20 1330  ampicillin (OMNIPEN) 2 g vial to attach to  ml bag      2 g  over 15 Minutes Intravenous EVERY 4 HOURS 20 1322      20 1330  acyclovir (ZOVIRAX) 1,100 mg in D5W 250 mL intermittent infusion      10 mg/kg × 113 kg  250 mL/hr over 1 Hours Intravenous EVERY 8 HOURS 20 1322      20 1050  acyclovir (ZOVIRAX) 800 mg in D5W 250 mL intermittent infusion      800 mg  250 mL/hr over 1 Hours Intravenous ONCE 20 1046            Contrast Orders - past 72 hours (72h ago, onward)    Start     Dose/Rate Route Frequency Ordered Stop    20 1215  gadobutrol (GADAVIST) injection 15 mL      15 mL Intravenous ONCE 20 1211 20 1212    20 1035  iopamidol (ISOVUE-370) solution 120 mL      120 mL Intravenous ONCE 20 1033                  Plan:  1.Start vancomycin  2000 mg IV q12h.   - Received 1x loading dose of 2500 mg in the ED.   2.  Goal Trough Level: 15-20 mg/L   3.  Pharmacy will check trough levels as appropriate in 1-3 Days.    4. Serum creatinine levels will be ordered daily for the first  week of therapy and at least twice weekly for subsequent weeks.    5. [facility] method utilized to dose vancomycin therapy: Method 1    Inocencia Haines, PharmD   Pharmacy Resident

## 2020-12-30 NOTE — PHARMACY-ADMISSION MEDICATION HISTORY
Pharmacy Medication History  Admission medication history interview status for the 12/30/2020  admission is complete. See EPIC admission navigator for prior to admission medications       Medication history sources: Patient's wife  Location of interview: Phone    Medication reconciliation completed by provider prior to medication history? No    Time spent in this activity: 30 minutes      Prior to Admission medications    Medication Sig Last Dose Taking? Auth Provider   fluticasone (FLONASE) 50 MCG/ACT nasal spray Spray 1 spray into both nostrils 2 times daily Past Week at Unknown time Yes Unknown, Entered By History   omeprazole (PRILOSEC) 20 MG DR capsule Take 20 mg by mouth daily 12/29/2020 at Unknown time Yes Unknown, Entered By History   oxymetazoline (AFRIN) 0.05 % nasal spray Spray 2 sprays into both nostrils 2 times daily Past Week at Unknown time Yes Unknown, Entered By History   sildenafil (VIAGRA) 100 MG tablet Take 1 tablet (100 mg) by mouth daily as needed (ED) as needed Yes Agusto Holguin MD       The information provided in this note is only as accurate as the sources available at the time of the update(s).

## 2020-12-30 NOTE — PROGRESS NOTES
Critical Care Progress Note      12/30/2020    Name: Stevie Dotson MRN#: [id number]   Age: 65 year old YOB: 1955     [facility]  [facility] #0    MV DAY #0             Problem List:   Active Problems:    Encephalitis    Meningitis  Loss of protective airway reflexes  encephalopathy         Summary/Hospital Course:     65M without marked pmh now presented with encephalopathy in setting of MRI results suggesting recent seizure and LP result suggesting meningitis.  Worsening encephalopathy on floor resulting in loss of airway protective reflexes requiring intubation for airway protection.  Unable to obtain further history at this time as pt is intubated/sedated.      Assessment and plan :     Stevie Dotson IS a 65 year old male admitted on 12/30/2020 for meningitis and encephalopathy requiring intubation for airway protection.   I have personally reviewed the daily labs, imaging studies, cultures and discussed the case with referring physician and consulting physicians.   My assessment and plan by system for this patient is as follows:    Neurology/Psychiatry:   1. Sedation:  Propofol, prn midazolam  2. Analgesia: prn dilaudid  3. Encephalopathy/encephalitis:  In setting of meningitis noted below.  Also having seizures vs post ictal.  On EEG.  Appreciate neuro input.  Continue keppra.    Cardiovascular:   1.  Lactic acidosis:  Resolved 2.9 --> 1.8.  2.  bp acceptable for now. Antihypertensives vs pressors prn.    Pulmonary/Ventilator Management:   1.  Loss of protective airway reflexes:  In setting of encephalopathy noted above.  Inappropriate for extubation today.    GI and Nutrition :   1. NPO for now.  Anticipate TF tomorrow.  2. PPI for pud prophy    Renal/Fluids/Electrolytes:   1. Creatinine ok 0.89.    Infectious Disease:   1. Meningitis:  Apparent on LP cell count; cultures pending.  Continue vanco/ctx/amp/acyclovir and steroids.    Endocrine:   1. Hyperglycemia:  Added ssi  insulin    Hematology/Oncology:   1. Wbc elevated 29 -- in setting of meningitis  2. hgb 15.7 ok  3. pltlts 329 ok    ICU Prophylaxis:   1. DVT: Hep Subq/mechanical  2. VAP: HOB 30 degrees, chlorhexidine rinse  3. Stress Ulcer: PPI/H2 blocker  4. Restraints: Nonviolent soft two point restraints required and necessary for patient safety and continued cares and good effect as patient continues to pull at necessary lines, tubes despite education and distraction. Will readdress daily.   5. Wound care - per unit routine   6. Feeding - npo for now; tf tomorrow.  7. Family Update: pending  8. Disposition - icu           Interim History:     Intubated on floor for airway protection; see above.         Key Medications:       sodium chloride 0.9 %  100 mL Intravenous Once     acyclovir (ZOVIRAX) IV  1,000 mg Intravenous Q8H     ampicillin  2 g Intravenous Q4H     cefTRIAXone  2 g Intravenous Q12H     chlorhexidine  15 mL Mouth/Throat Q12H     dexamethasone  10 mg Intravenous Q6H     iopamidol  120 mL Intravenous Once     sodium chloride (PF)  3 mL Intracatheter Q8H     sodium chloride (PF)  3 mL Intracatheter Q8H     vancomycin (VANCOCIN) IV  2,000 mg Intravenous Q12H       lactated ringers 100 mL/hr at 12/30/20 1524     propofol (DIPRIVAN) infusion 25 mcg/kg/min (12/30/20 1551)     propofol (DIPRIVAN) infusion                 Physical Examination:   Temp:  [100.6  F (38.1  C)-103.4  F (39.7  C)] 100.6  F (38.1  C)  Pulse:  [] 100  Resp:  [19-84] 34  BP: (115-163)/() 149/92  SpO2:  [94 %-98 %] 94 %  No intake or output data in the 24 hours ending 12/30/20 1600  Wt Readings from Last 4 Encounters:   12/30/20 113 kg (249 lb 1.9 oz)   12/30/20 113.4 kg (250 lb)   03/08/19 112.9 kg (249 lb)   11/14/18 115.4 kg (254 lb 8 oz)     BP - Mean:  [] 108  Resp: (!) 34    Recent Labs   Lab 12/30/20  0830   O2PER Room Air       GEN: sedated  HEENT: head ncat, sclera anicteric, OP patent, trachea midline   PULM: unlabored  synchronous with vent, clear anteriorly    CV/COR: RRR S1S2 no gallop,  No rub, no murmur  ABD: soft nontender, hypoactive bowel sounds, no mass  EXT:  Minimal Edema; warm x4  NEURO: sedated which limits exam  SKIN: no obvious rash  LINES: clean, dry intact         Data:   All data and imaging reviewed     ROUTINE ICU LABS (Last four results)  CMP  Recent Labs   Lab 12/30/20  0830      POTASSIUM 3.8   CHLORIDE 104   CO2 29   ANIONGAP 5   *   BUN 17   CR 0.89   GFRESTIMATED 90   GFRESTBLACK >90   HERNESTO 9.2   PROTTOTAL 8.3   ALBUMIN 3.3*   BILITOTAL 1.8*   ALKPHOS 78   AST 38   ALT 70     CBC  Recent Labs   Lab 12/30/20  0830   WBC 28.8*   RBC 5.12   HGB 15.7   HCT 47.6   MCV 93   MCH 30.7   MCHC 33.0   RDW 14.0        INRNo lab results found in last 7 days.  Arterial Blood Gas  Recent Labs   Lab 12/30/20  0830   O2PER Room Air       All cultures:  Recent Labs   Lab 12/30/20  0937   CULT PENDING     Recent Results (from the past 24 hour(s))   CT Head w/o Contrast    Narrative    CT OF THE HEAD WITHOUT CONTRAST December 30, 2020 8:39 AM     HISTORY: Altered level of consciousness (LOC), unexplained.    TECHNIQUE: Axial CT images of the head from the skull base to the  vertex were acquired without IV contrast. Radiation dose for this scan  was reduced using automated exposure control, adjustment of the mA  and/or kV according to patient size, or iterative reconstruction  technique.    COMPARISON: None.    FINDINGS: The ventricles and basal cisterns are within normal limits  in configuration. There is no midline shift. There are no extra-axial  fluid collections.  Gray-white differentiation is well maintained.    No intracranial hemorrhage, mass or recent infarct.    The visualized paranasal sinuses are well-aerated. There is no  mastoiditis. There are no fractures of the visualized bones      Impression    IMPRESSION: Normal head CT.        SEEMA LEPE MD   CTA Head Neck with Contrast    Narrative     CT ANGIOGRAM OF THE HEAD AND NECK WITHOUT AND WITH CONTRAST   12/30/2020 8:47 AM     COMPARISON: None.    HISTORY: Ataxia, stroke suspected.    TECHNIQUE:  Precontrast localizing scans were followed by CT  angiography with an injection of 120 mL Isovue-370 nonionic  intravenous contrast material with scans through the head and neck.   Images were transferred to a separate 3-D workstation where  multiplanar reformations and 3-D images were created.  Estimates of  carotid stenoses are made relative to the distal internal carotid  artery diameters except as noted.      FINDINGS:   Neck CTA: The bilateral common carotid, bilateral cervical internal  carotid and bilateral vertebral arteries are patent without stenosis.     Head CTA: There is calcified plaque of the intracranial distal right  internal carotid artery that results in mild narrowing of the  supraclinoid segment. The basilar, intracranial distal left internal  carotid, bilateral anterior cerebral, bilateral middle cerebral and  bilateral posterior cerebral arteries are patent and unremarkable. The  anterior communicating artery is patent.      Impression    IMPRESSION:  1. Mild atherosclerotic narrowing of the supraclinoid distal right  internal carotid artery.  2. Otherwise, normal neck and head CTA.      Radiation dose for this scan was reduced using automated exposure  control, adjustment of the mA and/or kV according to patient size, or  iterative reconstruction technique.    SEEMA LEPE MD   CT Head Perfusion w Contrast    Narrative    CT BRAIN PERFUSION December 30, 2020 9:27 AM    HISTORY: Ataxia. Nonverbal.    TECHNIQUE: Time sequential axial CT images of the head were acquired  during the administration of intravenous contrast (50 mL Isovue-370).  CTA images of the [place] as well as color perfusion maps of  the brain were created from this time sequential axial source data.  Radiation dose for this scan was reduced using automated  exposure  control, adjustment of the mA and/or kV according to patient size, or  iterative reconstruction technique.     COMPARISON: None.      Impression    IMPRESSION: Nondiagnostic study.      SEEMA LEPE MD   XR Chest Port 1 View    Narrative    XR CHEST PORT 1 VW 12/30/2020 10:10 AM    HISTORY: sob    COMPARISON: None.    FINDINGS: Mild asymmetric elevation of the right hemidiaphragm.  The  lungs are grossly clear and there are no definite pleural effusions.  Upper normal heart size. No overt vascular congestion or pulmonary  edema.    MILANA LEBRON MD   MR Brain w/o & w Contrast    Narrative    MRI BRAIN WITHOUT AND WITH CONTRAST December 30, 2020 12:12 PM    HISTORY: Right gaze, left hemiparesis, aphasia.     TECHNIQUE: Multiplanar, multisequence MRI of the brain without and  with 15mL Gadavist.    COMPARISON: Same day head CT 12/30/2020.    FINDINGS: Mild motion artifact. Mild volume loss is present. Few  scattered white matter T2 hyperintensities like represent mild chronic  small vessel ischemic change. No evidence of acute ischemia,  hemorrhage, mass, mass effect, or hydrocephalus. No abnormal  enhancement or diffusion restriction.    Perfusion imaging demonstrates slight asymmetric transit time  corresponding to the right cerebral hemisphere. There is also slight  asymmetric decrease in blood flow and blood volume to the right  cerebral hemisphere.    The visualized calvarium, tympanic cavities, mastoid cavities, and  paranasal sinuses are unremarkable.      Impression    IMPRESSION:  1. No evidence of acute ischemia or hemorrhage.  2. Volume loss and white matter T2 hyperintensities which likely  present chronic small vessel ischemic change.  3. Slight asymmetric perfusion abnormalities involving the right  cerebral hemisphere.    KAITLYNN LU MD   MRA Brain ([place]) wo Contrast    Narrative    MR ANGIOGRAM OF THE HEAD WITHOUT CONTRAST December 30, 2020 12:13 PM     HISTORY: Right  gaze, left hemiparesis, aphasia, evaluate for right MCA  thrombi.    TECHNIQUE: 3D time-of-flight MR angiogram of the head without  contrast.    COMPARISON: Same day head CTA 12/30/2020.      Impression    IMPRESSION: Moderate motion artifact. No definite large vessel  occlusion. Asymmetrically decreased flow-related enhancement  corresponding to the right middle cerebral artery distribution at the  M2 segments.    KAITLYNN LU MD   MRA Neck (Carotids) wo & w Contrast    Narrative    MRA NECK WITHOUT AND WITH CONTRAST  12/30/2020 12:13 PM     HISTORY: Right gaze, left hemiparesis, aphasia.    TECHNIQUE: 2D time-of-flight MR angiogram of the neck without contrast  and 3D MR angiogram of the neck with 15 mL Gadavist. Estimates of  carotid stenoses are made relative to the distal internal carotid  artery diameters except as noted.    COMPARISON: Same day CTA of the head and neck.      Impression    IMPRESSION:  Motion limited exam. No convincing evidence of large  vessel occlusion or high-grade stenosis.    KAITLYNN LU MD     Labs (personally reviewed/interpreted):  See a/p    CXR (personally reviewed/interpreted):  ETT and line in acceptable position.  Lungs clear.    EKG (personally reviewed/intepreted):  Sinus 121; nl axis; nl int; no acute ischemic changes.    D/w Dr. Palma of ID, Dr. Guardado of neuro, and ROGERS Parikh of the house service.    Billing: This patient is critically ill: Yes. Total critical care time today 45 min.

## 2020-12-30 NOTE — PLAN OF CARE
Code Blue called.  Pt respiratory status decompensating. Team here to intubate at bedside. Transferred to ICU

## 2020-12-30 NOTE — ED NOTES
Bed: ED07  Expected date:   Expected time:   Means of arrival:   Comments:  Pt coming from [facility]

## 2020-12-30 NOTE — ED NOTES
Bed: ED03  Expected date: 12/30/20  Expected time: 8:06 AM  Means of arrival: Ambulance  Comments:  [facility]- stroke alert

## 2020-12-30 NOTE — ED NOTES
[facility]  ED Nurse Handoff Report    ED Chief complaint: Altered Mental Status      ED Diagnosis:   Final diagnoses:   Encephalitis   Meningitis       Code Status: Not addressed in ED    Allergies: No Known Allergies    Patient Story: Pt coming from [facility] ED as a code stroke. Pt started having some altered mental status last night around 2200. This morning wife noted patient was worse so sent to ER.  Focused Assessment:  GCS 12. Follows some commands. Pt is altered, responds to questions with one word, mostly illegible. Not moving left leg, left arm is rigid. Moves right arm and leg with small effort. Fever. LP, CT and labs done at [facility]. BC times two done. Antibiotics started. Just got back from MRI. Will give keppra    Labs Ordered and Resulted from Time of ED Arrival Up to the Time of Departure from the ED   LACTIC ACID WHOLE BLOOD   BLOOD CULTURE   BLOOD CULTURE         Treatments and/or interventions provided: Tylenol and antibiotics  Patient's response to treatments and/or interventions: no changes    To be done/followed up on inpatient unit:  none currently    Does this patient have any cognitive concerns?: Disoriented to time, Disoriented to place, Disoriented to situation and Disorientation to person    Activity level - Baseline/Home:  Independent  Activity Level - Current:   Total Care    Patient's Preferred language: English   Needed?: No    Isolation: None and COVID r/o and special precautions  Infection: Not Applicable  COVID r/o and special precautions  Patient tested for COVID 19 prior to admission: YES  Bariatric?: No    Vital Signs:   Vitals:    12/30/20 1031 12/30/20 1034 12/30/20 1045 12/30/20 1100   BP: (!) 141/84 (!) 154/90 (!) 156/92    Pulse: 101 101 96 103   Resp: 24 20 19 30   Temp:  100.7  F (38.2  C)     SpO2:  95%  98%   Weight:       Height:           Cardiac Rhythm:Cardiac Rhythm: Sinus tachycardia    Was the PSS-3 completed:   No -obtunded  What  interventions are required if any?               Family Comments: wife at home, please keep updated  OBS brochure/video discussed/provided to patient/family: N/A              Name of person given brochure if not patient: na              Relationship to patient: na    For the majority of the shift this patient's behavior was Green.   Behavioral interventions performed were none.    ED NURSE PHONE NUMBER: [phone]

## 2020-12-30 NOTE — ED PROVIDER NOTES
I assisted Dr. Trejo with lumbar puncture as this was a technically difficult lumbar puncture in a patient with altered mental status.  Lumbar puncture was successful and is grossly infected although CSF studies are pending.      Lumbar Puncture      Indication:  fever and confusion       Consent:  Risks (including but not limited to; infection, bleeding, spinal headache with possibility of spinal patch and temporary or permanent neurologic injury), benefits and alternatives were discussed with unable to obtain due to emergency conditions and consent for procedure was obtained.     Timeout:  Universal protocol was followed. TIME OUT conducted just prior to starting procedure confirmed patient identity, site/side, procedure, patient position, and availability of correct equipment and implants? Yes     Medication:  Lidocaine: Local infiltration     Procedure Note:  Patient was placed in a left lateral decubitus position.  The low back was prepped with Betadine.  The patient was medicated as above.  A spinal needle was used to gain access to the subarachnoid space with stylet in place.The fluid was cloudy and turbid.  Stylet was replaced and needle withdrawn.     Patient Status:  Patient tolerated the procedure well.  There were no complications.     Rashid Limon MD  12/30/20 1033
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
## 2020-12-30 NOTE — ED TRIAGE NOTES
"Pt presents from home with a fever that started last night. Wife found pt this morning lethargic and \"not very responsive\". Pt is not moving L. Side. LNW 10pm last night  "
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
## 2020-12-30 NOTE — PLAN OF CARE
RECEIVING UNIT ED HANDOFF REVIEW    ED Nurse Handoff Report was reviewed by: Nicki Mansfield RN on December 30, 2020 at 1:01 PM

## 2020-12-30 NOTE — ED NOTES
Pt inconsistently following commands with his right hand. Pt will squeeze RN hand. Cannot lift right or left leg. Eyes deviated to the R. Side.
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
## 2020-12-30 NOTE — INTERIM SUMMARY
"Brief Stroke Note:     Initially evaluated by our team at [facility], see previous note for further detail. Taken to MRI on arrival with findings as follows:     MRI Brain IMPRESSION:  1. No evidence of acute ischemia or hemorrhage.  2. Volume loss and white matter T2 hyperintensities which likely  present chronic small vessel ischemic change.  3. Slight asymmetric perfusion abnormalities involving the right  cerebral hemisphere.    MRA Brain IMPRESSION: Moderate motion artifact. No definite large vessel  occlusion. Asymmetrically decreased flow-related enhancement  corresponding to the right middle cerebral artery distribution at the  M2 segments.    MRA Neck IMPRESSION:  Motion limited exam. No convincing evidence of large  vessel occlusion or high-grade stenosis.    Given no evidence of acute stroke and LP findings, suspect bacterial meningitis. ED reported left arm stiffness and patient was loaded with 2 g Keppra. Further management per general neurology.    Thank you for this consult. No further stroke evaluation is indicated, we will sign off. Please contact us with additional questions.     MANUELITO Jeffers, CNP  Neurology  12/30/2020 1:41 PM  To page stroke neurology after hours or on a subsequent day, click here: AMCOM  Choose \"On Call\" tab at top, then search dropdown box for \"Neurology Adult\" & press Enter, look for Neuro ICU/Stroke         "
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
## 2020-12-30 NOTE — ED PROVIDER NOTES
"  History   Chief Complaint:  Neurologic Problem and Fever       The history is provided by the EMS personnel. The history is limited by the condition of the patient.      Stevie Dotson is a 65 year old male  who presents with left sided weakness and fever. EMS reports the patient developed a fever yesterday with ongoing fever today. Last known normal was 2200 last night, and this morning he had left sided weakness and was unable to answer questions. EMS reports he was tested for COVID yesterday, result still pending. There is no record of anticoagulation.     Collateral history obtained by wife.  She reports patient was acting differently around 1900 yesterday, and by 2200 he was \"picking at sheets.\"  Upon waking up this AM he was not speaking and not moving his L. Side.  No reported trauma.  No reported IVDA.  She does admit patient drinks ETOH last drink a few days ago.      Review of Systems   Unable to perform ROS: Mental status change   Constitutional: Positive for fever.   Psychiatric/Behavioral: Positive for confusion.       Allergies:  No Known Allergies    Medications:  Omeprazole   Viagra     Past Medical History:    Acid reflux  Hyperlipidemia      Past Surgical History:    Turbinate reduction  Small bowel resection  Pancreatectomy partial  splenectomy     Family History:    Skin cancer     Social History:  The patient presents via EMS.   The patient lives with his wife.     Physical Exam     Patient Vitals for the past 24 hrs:   BP Temp Temp src Pulse Resp SpO2 Weight   12/30/20 1004 -- -- -- -- -- -- 113.4 kg (250 lb)   12/30/20 0955 -- -- -- 118 21 -- --   12/30/20 0950 -- -- -- 122 24 97 % --   12/30/20 0945 (!) 115/91 -- -- 117 26 95 % --   12/30/20 0940 -- -- -- 121 23 97 % --   12/30/20 0935 -- -- -- 126 (!) 34 -- --   12/30/20 0930 (!) 136/101 -- -- 121 19 -- --   12/30/20 0925 -- -- -- 112 23 -- --   12/30/20 0920 -- -- -- 110 (!) 34 -- --   12/30/20 0915 (!) 154/108 103.4  F (39.7  C) " Rectal 110 27 -- --   12/30/20 0900 (!) 159/102 -- -- -- -- -- --   12/30/20 0823 (!) 163/99 -- -- 118 22 96 % --       Physical Exam  Nursing note and vitals reviewed.  Constitutional: Well nourished. Ill appearing  Eyes: Conjunctiva normal.  Pupils are equal, round, and reactive to light.  R. Side eye deviation  ENT: Nose normal. Mucous membranes pink and moist.    Neck: Normal range of motion.  CVS: Sinus tachycardia  Pulmonary: Lungs diminished bilaterally  GI: Abdomen soft. Nontender, nondistended. No rigidity or guarding.    MSK: No calf tenderness or swelling.  Neuro: GCS 7. Moves RUE/RLE without difficulty.  No purposeful movement to LUE/LLE.   Skin: Skin is warm and dry. No rash noted.   Psychiatric: Flat affect.       Emergency Department Course   ECG:  ECG taken at 0938, ECG read at 0938  Sinus tachycardia  Possible left atrial enlargement   Incomplete right bundle branch block  Nonspecific ST abnormality   Abnormal ECG  Rate 121 bpm. SC interval 150 ms. QRS duration 102 ms. QT/QTc 330/468 ms. P-R-T axes 61 6 26.     Imaging:  CT Head Perfusion w Contrast  IMPRESSION: Nondiagnostic study.  Reading per radiology     CTA Head Neck with Contrast  IMPRESSION:  1. Mild atherosclerotic narrowing of the supraclinoid distal right  internal carotid artery.  2. Otherwise, normal neck and head CTA.      Radiation dose for this scan was reduced using automated exposure  control, adjustment of the mA and/or kV according to patient size, or  iterative reconstruction technique.  Reading per radiology     CT Head w/o Contrast  IMPRESSION: Normal head CT.  Reading per radiology    XR Chest Port 1 View   Pending     Laboratory:  CBC: WBC 28.8(H), HGB 15.7,   CMP: glucose 224(H), bilirubin 1.8(H), albumin 3.3(L) o/w WNL (Creatinine 0.89)  Lactic acid whole blood(result time 0844) 2.9(H)   Troponin (Collected 0830): <0.015  Alcohol ethyl: <0.01  blood gas venous and oxyhgb: pH: 7.38, PCO2: 47, PO2: 22(L), Bicarbonate:  28, Oxyhgb: 36, FIO2 room air, base excess 1.9  Blood culture pending    Cell count with differential CSF: pending  Gram stain pending    Glucose by meter(0828): 217(H)     Symptomatic Influenza A/B & SARS-CoV2 (COVID19) Virus PCR Multiplex pending      UA with microscopic: glucose 500(H), urineketon 20, protein albumin 50, mucous present o/w WNL      Interventions:   0909 NS 1000 mL IV  0910 Acetaminophen 975 mg Rectal  931 Decadron 10 mg IV  0939 Rocephin 2 g IV    [facility]    -Lumbar Puncture    Date/Time: 12/30/2020 9:53 AM  Performed by: Salina Trejo DO  Authorized by: Salina Trejo DO       PRE-PROCEDURE DETAILS:     Procedure purpose:  Diagnostic    ANESTHESIA (see MAR for exact dosages):     Anesthesia method:  Local infiltration    Local anesthetic:  Lidocaine 1% WITH epi      PROCEDURE DETAILS:     Lumbar space:  L4-L5 interspace    Patient position:  L lateral decubitus    Needle gauge:  20    Needle type:  Spinal needle - Quincke tip    Needle length (in):  3.5    Ultrasound guidance: no      Number of attempts:  2    POST-PROCEDURE:     Puncture site:  Adhesive bandage applied      PROCEDURE   Patient Tolerance:  Patient tolerated the procedure well with no immediate complications  Describe Procedure: Unable to obtain CSF fluid; procedure attempted by my colleague Dr. Limon and fluid obtained      Emergency Department Course:      0821 EMS arrival and report.   0822 I examined and obtained history on the patient.   0823 Code stroke called.   0826 I spoke with Cele Cardona PA-C of Stroke Neurology regarding the patient presentation and plan of care.   0828 I returned to further assess the patient.   0841 I called the patient's wife at [phone] to obtain further history.     0905 I spoke with Dr. Fox of Radiology regarding the patient's presentation and plan of care.   0913 I spoke with Cele Cardona PA-C of Stroke Neurology regarding the patient presentation  and plan of care.   0917 I returned to recheck the patient.   0920 I spoke with Dr. Calhoun of the Emergency Department service from [facility] regarding patient's presentation, findings, and plan of care.   0937 I performed the lumbar puncture on the patient as noted above.   0945 EMS arrival to transfer the patient.   0954 The patient was transferred to [facility].     Impression & Plan   Covid-19  Stevie Dotson was evaluated during a global COVID-19 pandemic, which necessitated consideration that the patient might be at risk for infection with the SARS-CoV-2 virus that causes COVID-19.   Applicable protocols for evaluation were followed during the patient's care.   COVID-19 was considered as part of the patient's evaluation. The plan for testing is:  a test was obtained at a previous visit and reviewed & considered today.    CMS Diagnoses: The Lactic acid level is elevated due to sepsis, at this time there is no sign of severe sepsis or septic shock. and None    Medical Decision Making:  Patient is a 65-year-old male presenting for altered mental status.  Patient with noted left-sided weakness and right sided eye deviation on arrival.  He is following minimal commands though protecting his airway.  Code stroke called on arrival.  CT head without ischemia or hemorrhage.  CT perfusion unfortunately unable to be completed secondary to patient reportedly became more agitated during his time at CT.  I did discuss the case with stroke neurology.  Recommended holding off on TPA at this time though given patient's presentation concern for CVA versus encephalitis.  Stroke neurology recommended emergent transfer to [facility] for definitive management with likely MRI.  Patient with noted leukocytosis and elevated lactate.  LP was performed prior to patient's transfer to [facility].  He was given IV steroids and empiric antibiotics initiated given concern for meningitis.  Vancomycin and acylovir not given  as did not want to delay transfer to outside facility.  Wife updated on patient prognosis and agreeable to transfer.     Critical Care Time: was 35 minutes for this patient excluding procedures    Diagnosis:    ICD-10-CM    1. Encephalitis  G04.90     concern for   2. Altered mental status, unspecified altered mental status type  R41.82    3. Left-sided weakness  R53.1      Scribe Disclosure:  I, Kristina Harden, am serving as a scribe at 8:21 AM on 12/30/2020 to document services personally performed by Salina Trejo DO based on my observations and the provider's statements to me.          Salina Trejo,   12/30/20 1029
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
## 2020-12-31 ENCOUNTER — HOSPITAL ENCOUNTER (OUTPATIENT)
Dept: NEUROLOGY | Facility: CLINIC | Age: 65
DRG: 871 | End: 2020-12-31
Attending: NURSE PRACTITIONER
Payer: COMMERCIAL

## 2020-12-31 ENCOUNTER — APPOINTMENT (OUTPATIENT)
Dept: CARDIOLOGY | Facility: CLINIC | Age: 65
DRG: 871 | End: 2020-12-31
Attending: INTERNAL MEDICINE
Payer: COMMERCIAL

## 2020-12-31 LAB
ALBUMIN SERPL-MCNC: 2.3 G/DL (ref 3.4–5)
ALP SERPL-CCNC: 51 U/L (ref 40–150)
ALT SERPL W P-5'-P-CCNC: 57 U/L (ref 0–70)
ANION GAP SERPL CALCULATED.3IONS-SCNC: 5 MMOL/L (ref 3–14)
AST SERPL W P-5'-P-CCNC: 44 U/L (ref 0–45)
BACTERIA SPEC CULT: NO GROWTH
BILIRUB SERPL-MCNC: 0.8 MG/DL (ref 0.2–1.3)
BUN SERPL-MCNC: 17 MG/DL (ref 7–30)
CALCIUM SERPL-MCNC: 8.1 MG/DL (ref 8.5–10.1)
CHLORIDE SERPL-SCNC: 110 MMOL/L (ref 94–109)
CO2 SERPL-SCNC: 24 MMOL/L (ref 20–32)
CREAT SERPL-MCNC: 1.04 MG/DL (ref 0.66–1.25)
ERYTHROCYTE [DISTWIDTH] IN BLOOD BY AUTOMATED COUNT: 14.5 % (ref 10–15)
GFR SERPL CREATININE-BSD FRML MDRD: 75 ML/MIN/{1.73_M2}
GLUCOSE BLDC GLUCOMTR-MCNC: 163 MG/DL (ref 70–99)
GLUCOSE BLDC GLUCOMTR-MCNC: 171 MG/DL (ref 70–99)
GLUCOSE BLDC GLUCOMTR-MCNC: 195 MG/DL (ref 70–99)
GLUCOSE BLDC GLUCOMTR-MCNC: 216 MG/DL (ref 70–99)
GLUCOSE BLDC GLUCOMTR-MCNC: 227 MG/DL (ref 70–99)
GLUCOSE BLDC GLUCOMTR-MCNC: 263 MG/DL (ref 70–99)
GLUCOSE SERPL-MCNC: 240 MG/DL (ref 70–99)
HCT VFR BLD AUTO: 42.1 % (ref 40–53)
HGB BLD-MCNC: 13.8 G/DL (ref 13.3–17.7)
HSV1 DNA CSF QL NAA+PROBE: NOT DETECTED
HSV2 DNA CSF QL NAA+PROBE: NOT DETECTED
Lab: NORMAL
MAGNESIUM SERPL-MCNC: 2.6 MG/DL (ref 1.6–2.3)
MCH RBC QN AUTO: 30.1 PG (ref 26.5–33)
MCHC RBC AUTO-ENTMCNC: 32.8 G/DL (ref 31.5–36.5)
MCV RBC AUTO: 92 FL (ref 78–100)
MICROBIOLOGIST REVIEW: NORMAL
PHOSPHATE SERPL-MCNC: 1.8 MG/DL (ref 2.5–4.5)
PLATELET # BLD AUTO: 262 10E9/L (ref 150–450)
POTASSIUM SERPL-SCNC: 3.5 MMOL/L (ref 3.4–5.3)
PROT SERPL-MCNC: 6.8 G/DL (ref 6.8–8.8)
RBC # BLD AUTO: 4.59 10E12/L (ref 4.4–5.9)
SODIUM SERPL-SCNC: 139 MMOL/L (ref 133–144)
SPECIMEN SOURCE: NORMAL
VANCOMYCIN SERPL-MCNC: 17.6 MG/L
WBC # BLD AUTO: 25.7 10E9/L (ref 4–11)

## 2020-12-31 PROCEDURE — 93306 TTE W/DOPPLER COMPLETE: CPT

## 2020-12-31 PROCEDURE — 84100 ASSAY OF PHOSPHORUS: CPT | Performed by: INTERNAL MEDICINE

## 2020-12-31 PROCEDURE — 999N000157 HC STATISTIC RCP TIME EA 10 MIN

## 2020-12-31 PROCEDURE — 95720 EEG PHY/QHP EA INCR W/VEEG: CPT | Performed by: PSYCHIATRY & NEUROLOGY

## 2020-12-31 PROCEDURE — 250N000011 HC RX IP 250 OP 636: Performed by: INTERNAL MEDICINE

## 2020-12-31 PROCEDURE — 80053 COMPREHEN METABOLIC PANEL: CPT | Performed by: INTERNAL MEDICINE

## 2020-12-31 PROCEDURE — 250N000013 HC RX MED GY IP 250 OP 250 PS 637: Performed by: NURSE PRACTITIONER

## 2020-12-31 PROCEDURE — 272N000083 HC NUTRITION PRODUCT SEMIELEM INTERMED LITER

## 2020-12-31 PROCEDURE — 999N001017 HC STATISTIC GLUCOSE BY METER IP

## 2020-12-31 PROCEDURE — 200N000001 HC R&B ICU

## 2020-12-31 PROCEDURE — 36415 COLL VENOUS BLD VENIPUNCTURE: CPT | Performed by: STUDENT IN AN ORGANIZED HEALTH CARE EDUCATION/TRAINING PROGRAM

## 2020-12-31 PROCEDURE — 83735 ASSAY OF MAGNESIUM: CPT | Performed by: INTERNAL MEDICINE

## 2020-12-31 PROCEDURE — 85027 COMPLETE CBC AUTOMATED: CPT | Performed by: INTERNAL MEDICINE

## 2020-12-31 PROCEDURE — 250N000011 HC RX IP 250 OP 636: Performed by: NURSE PRACTITIONER

## 2020-12-31 PROCEDURE — 93306 TTE W/DOPPLER COMPLETE: CPT | Mod: 26 | Performed by: INTERNAL MEDICINE

## 2020-12-31 PROCEDURE — 99291 CRITICAL CARE FIRST HOUR: CPT | Performed by: INTERNAL MEDICINE

## 2020-12-31 PROCEDURE — 999N000052 EEG VIDEO 12-26 HR UNMONITORED

## 2020-12-31 PROCEDURE — 258N000003 HC RX IP 258 OP 636: Performed by: STUDENT IN AN ORGANIZED HEALTH CARE EDUCATION/TRAINING PROGRAM

## 2020-12-31 PROCEDURE — 94003 VENT MGMT INPAT SUBQ DAY: CPT

## 2020-12-31 PROCEDURE — 250N000011 HC RX IP 250 OP 636: Performed by: STUDENT IN AN ORGANIZED HEALTH CARE EDUCATION/TRAINING PROGRAM

## 2020-12-31 PROCEDURE — 80177 DRUG SCRN QUAN LEVETIRACETAM: CPT | Performed by: STUDENT IN AN ORGANIZED HEALTH CARE EDUCATION/TRAINING PROGRAM

## 2020-12-31 PROCEDURE — 250N000013 HC RX MED GY IP 250 OP 250 PS 637: Performed by: INTERNAL MEDICINE

## 2020-12-31 PROCEDURE — 258N000003 HC RX IP 258 OP 636: Performed by: INTERNAL MEDICINE

## 2020-12-31 PROCEDURE — 250N000012 HC RX MED GY IP 250 OP 636 PS 637: Performed by: INTERNAL MEDICINE

## 2020-12-31 PROCEDURE — 95714 VEEG EA 12-26 HR UNMNTR: CPT

## 2020-12-31 PROCEDURE — 99222 1ST HOSP IP/OBS MODERATE 55: CPT | Mod: GC | Performed by: PSYCHIATRY & NEUROLOGY

## 2020-12-31 PROCEDURE — 36415 COLL VENOUS BLD VENIPUNCTURE: CPT | Performed by: INTERNAL MEDICINE

## 2020-12-31 PROCEDURE — 999N000009 HC STATISTIC AIRWAY CARE

## 2020-12-31 PROCEDURE — 80202 ASSAY OF VANCOMYCIN: CPT | Performed by: INTERNAL MEDICINE

## 2020-12-31 RX ORDER — POTASSIUM CHLORIDE 20MEQ/15ML
20 LIQUID (ML) ORAL ONCE
Status: COMPLETED | OUTPATIENT
Start: 2020-12-31 | End: 2020-12-31

## 2020-12-31 RX ORDER — SODIUM CHLORIDE 9 MG/ML
INJECTION, SOLUTION INTRAVENOUS CONTINUOUS
Status: DISCONTINUED | OUTPATIENT
Start: 2020-12-31 | End: 2021-01-05

## 2020-12-31 RX ORDER — AMINO AC/PROTEIN HYDR/WHEY PRO 10G-100/30
2 LIQUID (ML) ORAL EVERY 6 HOURS
Status: DISCONTINUED | OUTPATIENT
Start: 2021-01-01 | End: 2021-01-04 | Stop reason: CLARIF

## 2020-12-31 RX ADMIN — HEPARIN SODIUM 5000 UNITS: 5000 INJECTION, SOLUTION INTRAVENOUS; SUBCUTANEOUS at 04:55

## 2020-12-31 RX ADMIN — INSULIN ASPART 1 UNITS: 100 INJECTION, SOLUTION INTRAVENOUS; SUBCUTANEOUS at 23:39

## 2020-12-31 RX ADMIN — CHLORHEXIDINE GLUCONATE 0.12% ORAL RINSE 15 ML: 1.2 LIQUID ORAL at 08:27

## 2020-12-31 RX ADMIN — DEXAMETHASONE SODIUM PHOSPHATE 10 MG: 4 INJECTION, SOLUTION INTRAMUSCULAR; INTRAVENOUS at 22:08

## 2020-12-31 RX ADMIN — POTASSIUM & SODIUM PHOSPHATES POWDER PACK 280-160-250 MG 2 PACKET: 280-160-250 PACK at 22:08

## 2020-12-31 RX ADMIN — PROPOFOL 50 MCG/KG/MIN: 10 INJECTION, EMULSION INTRAVENOUS at 05:22

## 2020-12-31 RX ADMIN — PROPOFOL 50 MCG/KG/MIN: 10 INJECTION, EMULSION INTRAVENOUS at 20:55

## 2020-12-31 RX ADMIN — INSULIN ASPART 2 UNITS: 100 INJECTION, SOLUTION INTRAVENOUS; SUBCUTANEOUS at 15:23

## 2020-12-31 RX ADMIN — INSULIN ASPART 1 UNITS: 100 INJECTION, SOLUTION INTRAVENOUS; SUBCUTANEOUS at 19:55

## 2020-12-31 RX ADMIN — LEVETIRACETAM 1000 MG: 10 INJECTION INTRAVENOUS at 08:30

## 2020-12-31 RX ADMIN — ACETAMINOPHEN 650 MG: 650 SUPPOSITORY RECTAL at 08:31

## 2020-12-31 RX ADMIN — SODIUM CHLORIDE, POTASSIUM CHLORIDE, SODIUM LACTATE AND CALCIUM CHLORIDE: 600; 310; 30; 20 INJECTION, SOLUTION INTRAVENOUS at 09:16

## 2020-12-31 RX ADMIN — PANTOPRAZOLE SODIUM 40 MG: 40 TABLET, DELAYED RELEASE ORAL at 08:40

## 2020-12-31 RX ADMIN — CHLORHEXIDINE GLUCONATE 0.12% ORAL RINSE 15 ML: 1.2 LIQUID ORAL at 19:53

## 2020-12-31 RX ADMIN — INSULIN GLARGINE 10 UNITS: 100 INJECTION, SOLUTION SUBCUTANEOUS at 15:28

## 2020-12-31 RX ADMIN — PROPOFOL 50 MCG/KG/MIN: 10 INJECTION, EMULSION INTRAVENOUS at 12:18

## 2020-12-31 RX ADMIN — PROPOFOL 40 MCG/KG/MIN: 10 INJECTION, EMULSION INTRAVENOUS at 23:41

## 2020-12-31 RX ADMIN — PROPOFOL 50 MCG/KG/MIN: 10 INJECTION, EMULSION INTRAVENOUS at 08:25

## 2020-12-31 RX ADMIN — DEXAMETHASONE SODIUM PHOSPHATE 10 MG: 4 INJECTION, SOLUTION INTRAMUSCULAR; INTRAVENOUS at 04:56

## 2020-12-31 RX ADMIN — DEXAMETHASONE SODIUM PHOSPHATE 10 MG: 4 INJECTION, SOLUTION INTRAMUSCULAR; INTRAVENOUS at 09:13

## 2020-12-31 RX ADMIN — SODIUM CHLORIDE, POTASSIUM CHLORIDE, SODIUM LACTATE AND CALCIUM CHLORIDE: 600; 310; 30; 20 INJECTION, SOLUTION INTRAVENOUS at 19:59

## 2020-12-31 RX ADMIN — PROPOFOL 50 MCG/KG/MIN: 10 INJECTION, EMULSION INTRAVENOUS at 15:42

## 2020-12-31 RX ADMIN — HEPARIN SODIUM 5000 UNITS: 5000 INJECTION, SOLUTION INTRAVENOUS; SUBCUTANEOUS at 20:54

## 2020-12-31 RX ADMIN — DEXAMETHASONE SODIUM PHOSPHATE 10 MG: 4 INJECTION, SOLUTION INTRAMUSCULAR; INTRAVENOUS at 15:28

## 2020-12-31 RX ADMIN — HYDROMORPHONE HYDROCHLORIDE 0.5 MG: 1 INJECTION, SOLUTION INTRAMUSCULAR; INTRAVENOUS; SUBCUTANEOUS at 15:42

## 2020-12-31 RX ADMIN — HEPARIN SODIUM 5000 UNITS: 5000 INJECTION, SOLUTION INTRAVENOUS; SUBCUTANEOUS at 12:15

## 2020-12-31 RX ADMIN — SODIUM CHLORIDE 175 MG PE: 9 INJECTION, SOLUTION INTRAVENOUS at 13:50

## 2020-12-31 RX ADMIN — PROPOFOL 50 MCG/KG/MIN: 10 INJECTION, EMULSION INTRAVENOUS at 18:19

## 2020-12-31 RX ADMIN — AMPICILLIN SODIUM 2 G: 2 INJECTION, POWDER, FOR SOLUTION INTRAVENOUS at 08:26

## 2020-12-31 RX ADMIN — SODIUM CHLORIDE 175 MG PE: 9 INJECTION, SOLUTION INTRAVENOUS at 05:24

## 2020-12-31 RX ADMIN — AMPICILLIN SODIUM 2 G: 2 INJECTION, POWDER, FOR SOLUTION INTRAVENOUS at 04:55

## 2020-12-31 RX ADMIN — ACYCLOVIR SODIUM 1000 MG: 50 INJECTION, SOLUTION INTRAVENOUS at 08:28

## 2020-12-31 RX ADMIN — LEVETIRACETAM 1000 MG: 10 INJECTION INTRAVENOUS at 19:53

## 2020-12-31 RX ADMIN — POTASSIUM & SODIUM PHOSPHATES POWDER PACK 280-160-250 MG 2 PACKET: 280-160-250 PACK at 15:28

## 2020-12-31 RX ADMIN — CEFTRIAXONE SODIUM 2 G: 2 INJECTION, POWDER, FOR SOLUTION INTRAMUSCULAR; INTRAVENOUS at 09:13

## 2020-12-31 RX ADMIN — POTASSIUM CHLORIDE 20 MEQ: 20 SOLUTION ORAL at 08:31

## 2020-12-31 RX ADMIN — INSULIN ASPART 2 UNITS: 100 INJECTION, SOLUTION INTRAVENOUS; SUBCUTANEOUS at 11:21

## 2020-12-31 RX ADMIN — SODIUM CHLORIDE: 9 INJECTION, SOLUTION INTRAVENOUS at 23:06

## 2020-12-31 RX ADMIN — INSULIN ASPART 3 UNITS: 100 INJECTION, SOLUTION INTRAVENOUS; SUBCUTANEOUS at 08:51

## 2020-12-31 RX ADMIN — ACYCLOVIR SODIUM 1000 MG: 50 INJECTION, SOLUTION INTRAVENOUS at 01:21

## 2020-12-31 RX ADMIN — INSULIN ASPART 2 UNITS: 100 INJECTION, SOLUTION INTRAVENOUS; SUBCUTANEOUS at 01:54

## 2020-12-31 RX ADMIN — HYDROMORPHONE HYDROCHLORIDE 0.5 MG: 1 INJECTION, SOLUTION INTRAMUSCULAR; INTRAVENOUS; SUBCUTANEOUS at 08:27

## 2020-12-31 RX ADMIN — PROPOFOL 50 MCG/KG/MIN: 10 INJECTION, EMULSION INTRAVENOUS at 11:18

## 2020-12-31 RX ADMIN — CEFTRIAXONE SODIUM 2 G: 2 INJECTION, POWDER, FOR SOLUTION INTRAMUSCULAR; INTRAVENOUS at 22:07

## 2020-12-31 RX ADMIN — SODIUM CHLORIDE 175 MG PE: 9 INJECTION, SOLUTION INTRAVENOUS at 23:01

## 2020-12-31 RX ADMIN — POTASSIUM & SODIUM PHOSPHATES POWDER PACK 280-160-250 MG 2 PACKET: 280-160-250 PACK at 08:40

## 2020-12-31 RX ADMIN — VANCOMYCIN HYDROCHLORIDE 2000 MG: 5 INJECTION, POWDER, LYOPHILIZED, FOR SOLUTION INTRAVENOUS at 11:18

## 2020-12-31 RX ADMIN — MIDAZOLAM HYDROCHLORIDE 2 MG: 1 INJECTION, SOLUTION INTRAMUSCULAR; INTRAVENOUS at 08:28

## 2020-12-31 RX ADMIN — PROPOFOL 50 MCG/KG/MIN: 10 INJECTION, EMULSION INTRAVENOUS at 02:53

## 2020-12-31 RX ADMIN — VANCOMYCIN HYDROCHLORIDE 2000 MG: 5 INJECTION, POWDER, LYOPHILIZED, FOR SOLUTION INTRAVENOUS at 23:36

## 2020-12-31 ASSESSMENT — ACTIVITIES OF DAILY LIVING (ADL)
ADLS_ACUITY_SCORE: 26

## 2020-12-31 ASSESSMENT — MIFFLIN-ST. JEOR: SCORE: 1978.88
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
## 2020-12-31 NOTE — PROGRESS NOTES
Bladder temp 105: Ice pks applied, cooling blanket ordered, rectal tylenol given, cooled saline bolus given.  Retention in bladder: ervin placed to decrease stimulation  Sedation provided adequately as pt arrived breathing mid 30's and seemed outside goals of care.  OGT placed to decompression and ok'd by dr. Myers for meds    Blood sugars 245- per Dr. Myers Slid scale. Will monitor and report back if needed.

## 2020-12-31 NOTE — PLAN OF CARE
Neuro: Cooling measures initiated stat, pupils contract to light and round, withdraws to lower extremities, now sedated at rass goal with propofol  CV: SR, +2, warm, flushed skin.   Pulm: clr, full support fio2 titrated down to 30%, ABG results were when pt was 30'sRR and overbreathing vent. Minimal secretions  GI/: OG to LIS with 250cc bile/brown/black clear. BS trending/slid scale. Craig placed d/t retention great uop.    Gtts: Propofol, IVMF  Lines: PIVx3, Craig, ETT, OGT    Update: Wife updated and aware of plans of care. Temp coming down now. EEG continued. Versed/dilaudid prn's given and available as needed. All care explained since transfer from 73.

## 2020-12-31 NOTE — CONSULTS
CLINICAL NUTRITION SERVICES  -  ASSESSMENT NOTE      Recommendations Ordered by Registered Dietitian (RD):   When ready to start TF (1/1), will begin Peptamen Intense VHP at 10 mL/hr;  After 12 hrs increase to 20 mL/hr = 480 kcals, 44 gm pro , 403 mL H20, 36 gm CHO, 2 gm fiber  Will add Prosource 8 packets per day (2 packets every 6 hrs) = 320 kcals, 88 gm pro  Total (TF + Prosource + Propofol):  1695 kcals (15 kcal/kg), 132 gm pro (1.6 gm/kg)  Free H20 60 mL every 4 hrs  Certavite daily - to meet vitamin/mineral needs while on low volume TF   Malnutrition:   % Weight Loss:  None noted  % Intake:  Unable to determine due to inability to obtain nutrition history  Subcutaneous Fat Loss:  Unable to determine  Muscle Loss:  Unable to determine  Fluid Retention:  None noted    Malnutrition Diagnosis: Unable to determine due to unable to obtain nutrition history and perform NFPA (Nutrition Focused Physical Assessment)        REASON FOR ASSESSMENT  Stevie Dotson is a 65 year old male seen by Registered Dietitian for Provider Order - Registered Dietitian to Assess and Order TF per Medical Nutrition Therapy Protocol      NUTRITION HISTORY  - Unable to obtain nutrition history as patient intubated and no family available.  DX:  AMS, fever  Bacterial meningitis w/sepsis   Acute infectious encephalopathy  Loss of protective airway reflexes    PMH:  Skin cancer, Acid reflux    Patient's wife reported to the ED that the patient drinks at least a 12 pack of beer/week but it may be more.  No food allergies/intolerances noted.    CURRENT NUTRITION ORDERS  Diet Order:     NPO   Plan to begin TF tomorrow per rounds.    Current Intake/Tolerance:  NPO x 2 days    12/30:  RRT --> Code Blue --> Intubated   12/30:  Chest x-ray = OG extends below the left hemidiaphragm presumably in the gastric lumen.       NUTRITION FOCUSED PHYSICAL ASSESSMENT FOR DIAGNOSING MALNUTRITION)  No:  Unable to visualize patient         Observed:   "  N/A    Obtained from Chart/Interdisciplinary Team:  None noted    ANTHROPOMETRICS  Height: 6' 1\"  Weight:  113 kg (12/30)  Body mass index is 32.9 kg/m .  Weight Status:  Obesity Grade I BMI 30-34.9  IBW:  83.6 kg  % IBW:  135%  Weight History:  Weight stable over past several years    Wt Readings from Last 20 Encounters:   12/31/20 114 kg (251 lb 5.2 oz)   12/30/20 113.4 kg (250 lb)   03/08/19 112.9 kg (249 lb)   11/14/18 115.4 kg (254 lb 8 oz)   12/30/17 113.4 kg (250 lb)     LABS  Labs reviewed  Mg 2.6 (H)  PHos 1.8 (L)    Glu 240 (H)  Hgb A1C 6.2 (H)    MEDICATIONS  Medications reviewed  Propofol at 33.9 mL/hr = 895 kcals (lipid) - for sedation  LR at 100 mL/hr - for fluid delivery    ASSESSED NUTRITION NEEDS PER APPROVED PRACTICE GUIDELINES:    Dosing Weight:  113 kg (energy), 83.6 kg (protein)   Estimated Energy Needs:  3270-4803 kcals (14-17 Kcal/Kg)  Justification: obese  Estimated Protein Needs:  125-150 grams protein (1.5-1.8 g pro/Kg)  Justification: hypercatabolism with critical illness and obesity guidelines   Estimated Fluid Needs:  4756-5601 mL (1 mL/Kcal)  Justification: maintenance    MALNUTRITION:  % Weight Loss:  None noted  % Intake:  Unable to determine due to inability to obtain nutrition history  Subcutaneous Fat Loss:  Unable to determine  Muscle Loss:  Unable to determine  Fluid Retention:  None noted    Malnutrition Diagnosis: Unable to determine due to unable to obtain nutrition history and perform NFPA (Nutrition Focused Physical Assessment)    NUTRITION DIAGNOSIS:  Inadequate protein-energy intake related to intubation as evidenced by NPO status, Propofol meeting 57% energy and 0% protein needs and TF planned 1/1.    NUTRITION INTERVENTIONS  Recommendations / Nutrition Prescription  When ready to start TF (1/1), will begin Peptamen Intense VHP at 10 mL/hr;  After 12 hrs increase to 20 mL/hr = 480 kcals, 44 gm pro , 403 mL H20, 36 gm CHO, 2 gm fiber  Will add Prosource 8 packets per day " (2 packets every 6 hrs) = 320 kcals, 88 gm pro  Total (TF + Prosource + Propofol):  1695 kcals (15 kcal/kg), 132 gm pro (1.6 gm/kg)  Free H20 60 mL every 4 hrs  Certavite daily - to meet vitamin/mineral needs while on low volume TF    Implementation  Nutrition education: Not appropriate at this time due to patient condition  Collaboration and Referral of Nutrition care - Pt was discussed during ICU interdisciplinary rounds this morning  EN Composition, EN Schedule, Feeding Tube Flush, Medical Food Supplement, and Multivitamin/Mineral - Entered above orders to begin 1/1 (if acceptable with Provider)    Nutrition Goals  TF + Prosource + Propofol will meet % estimated needs in 48-72 hrs    MONITORING AND EVALUATION:  Progress towards goals will be monitored and evaluated per protocol and Practice Guidelines    Emerald Bobo, RD, LD, CNSC

## 2020-12-31 NOTE — PROGRESS NOTES
[facility] ICU RESPIRATORY NOTE        Date of Admission: 12/30/2020    Date of Intubation (most recent): 12/30/2020    Reason for Mechanical Ventilation: Airway protection    Number of Days on Mechanical Ventilation: 2    Met Criteria for Spontaneous Breathing Trial: No    Reason for No Spontaneous Breathing Trial: No per MD    Significant Events Today: None this shift.     ABG Results:   Recent Labs   Lab 12/30/20  1640 12/30/20  0830   PH 7.41  --    PCO2 35  --    PO2 93  --    HCO3 22  --    O2PER  --  Room Air       Current Vent Settings: Ventilation Mode: CMV/AC  (Continuous Mandatory Ventilation/ Assist Control)  FiO2 (%): 30 %  Rate Set (breaths/minute): 16 breaths/min  Tidal Volume Set (mL): 500 mL  PEEP (cm H2O): 5 cmH2O  Oxygen Concentration (%): 30 %  Resp: 21      Plan: continue on full ventilator support.     Samaria Gupta, RT

## 2020-12-31 NOTE — PROGRESS NOTES
Brief ICU update:    Asked by neurology to call ID regarding possible empiric antifungal coverage (given CSF glucose of 3). Dr. Palma feels that this is not yet indicated, feels clinical picture still fits most with bacterial meningitis. She asked that blasto antigen be added on to CSF (ordered, as well as histo antigen), and that blasto and histo antibodies be checked in the serum (ordered). Will also check aspergillus galactomannan, aspergillus ab, and Fungitell. No current need for liposomal amphotericin (would be antifungal of choice) at this point.     Davidson Be MD     Update: received call from [facility], where LP was preformed, only 0.5 mL of CSF remains. This is not enough for either fungal antigen test to be added on. Continue to check serum testing instead.     Davidson Be MD

## 2020-12-31 NOTE — PROGRESS NOTES
[facility]    Infectious Disease Progress Note    Date of Service (when I saw the patient): 12/31/2020     Assessment & Plan   Stevie Dotson is a 65 year old male who was admitted on 12/30/2020.     Impression:  1. 65 y.o male with admitted with confusion and fever.   2. Progressive encephalopathy in setting of MRI results suggesting recent seizure   3. LP done suggestive of meningitis.    4. Started on broad spectrum antimicrobials.   5. Intubated due to progressive encephalopathy.   6. CSF cultures now coming back with GPC In pairs and chains which along with positive blood cultures for step pneumo explains the presentation.        Recommendations:   Continue on Ceftriaxone and vancomycin till full cultures info available from the blood and the CSF.   Can stop Acyclovir and Ampicillin.   Discussed with ICU     Ria Palma MD    Interval History   Cultures as listed   Stays intubated   Febrile upto 105.1       Physical Exam   Temp: 96.4  F (35.8  C) Temp src: Bladder BP: 97/67 Pulse: 64   Resp: 21 SpO2: 95 % O2 Device: Mechanical Ventilator    Vitals:    12/30/20 1030 12/31/20 0600   Weight: 113 kg (249 lb 1.9 oz) 114 kg (251 lb 5.2 oz)     Vital Signs with Ranges  Temp:  [96.4  F (35.8  C)-105.1  F (40.6  C)] 96.4  F (35.8  C)  Pulse:  [] 64  Resp:  [10-84] 21  BP: ()/(54-99) 97/67  FiO2 (%):  [30 %-60 %] 30 %  SpO2:  [92 %-100 %] 95 %    Constitutional: intubated and sedated   Lungs: Clear to auscultation bilaterally, no crackles or wheezing  Cardiovascular: Regular rate and rhythm, normal S1 and S2, and no murmur noted  Abdomen: Normal bowel sounds, soft, non-distended, non-tender  Skin: No rashes, no cyanosis, no edema  Other:    Medications     lactated ringers 100 mL/hr at 12/31/20 0916     propofol (DIPRIVAN) infusion 50 mcg/kg/min (12/31/20 0825)       sodium chloride 0.9%  500 mL Intravenous Once     sodium chloride 0.9 %  100 mL Intravenous Once     cefTRIAXone   2 g Intravenous Q12H     chlorhexidine  15 mL Mouth/Throat Q12H     dexamethasone  10 mg Intravenous Q6H     fosphenytoin (CEREBYX) IV (loading dose)  2 mg PE/kg (Adjusted) Intravenous Q8H     heparin ANTICOAGULANT  5,000 Units Subcutaneous Q8H     insulin aspart  1-6 Units Subcutaneous Q4H     iopamidol  120 mL Intravenous Once     levETIRAcetam  1,000 mg Intravenous Q12H     pantoprazole  40 mg Oral Daily     potassium & sodium phosphates  2 packet Oral or Feeding Tube TID     sodium chloride (PF)  3 mL Intracatheter Q8H     sodium chloride (PF)  3 mL Intracatheter Q8H     vancomycin (VANCOCIN) IV  2,000 mg Intravenous Q12H       Data   All microbiology laboratory data reviewed.  Recent Labs   Lab Test 12/31/20  0442 12/30/20  0830   WBC 25.7* 28.8*   HGB 13.8 15.7   HCT 42.1 47.6   MCV 92 93    329     Recent Labs   Lab Test 12/31/20  0442 12/30/20  0830 03/08/19  0845   CR 1.04 0.89 1.08     No lab results found.  Recent Labs   Lab Test 12/30/20  2302 12/30/20  1033 12/30/20  0953 12/30/20  0937 12/30/20  0830   CULT No growth after 3 hours No growth after 17 hours Moderate growth  Gram positive cocci in pairs and chains  Susceptibility testing in progress  *  Critical Value/Significant Value, preliminary result only, called to and read back by  Janis Zafar RN at 1014 [id number]   PENDING Cultured on the 1st day of incubation:  Gram positive cocci in pairs and chains  Susceptibility testing in progress  *  Critical Value/Significant Value, preliminary result only, called to and read back by  Abdullahi Brooks RN. @2321. 12.30.20. BS.     (Note)  POSITIVE for STREPTOCOCCUS PNEUMONIAE by [employer] multiplex nucleic  acid test. Note: Streptococcus mitis group is known to cross react  with S. pneumoniae. Correlation with culture results and clinical  presentation is necessary. Final identification and antimicrobial  susceptibility testing will be verified by standard methods.    Specimen tested with  [employer]igene multiplex, gram-positive blood culture  nucleic acid test for the following targets: Staph aureus, Staph  epidermidis, Staph lugdunensis, other Staph species, Enterococcus  faecalis, Enterococcus faecium, Streptococcus species, S. agalactiae,  S. anginosus grp., S. pneumoniae, S. pyogenes, Listeria sp., mecA  (methicillin resistance) and Robert/B (vancomycin resistance).    Critical Value/Significant Value called to and read back by Abdullahi Brooks at 0205 on 12.31.20 by SS.         Attestation:  Total time on the floor involved in the patient's care: 35 minutes. Total time spent in counseling/care coordination: >50%

## 2020-12-31 NOTE — PROGRESS NOTES
Preliminary Video EEG impression on December 30- 31, 2020  Until 9am     This video EEG is abnormal due to the presences of continuous generalized polymorphic delta/theta slowing with superimposed alpha activity.  There is a persistent asymmetry with right hemisphere being more attenuated compared to the left hemisphere.  Additionally the left hemisphere has variable frequencies up to 13 Hz throughout the file.  There is no clear parieto-occipital rhythm or well formed sleep architecture. EEG is  reactive with stimulation.     This EEG is consistent with a severe diffuse encephalopathy with maximum cortical dysfunction in the right hemisphere. No epileptiform discharges or electrographic seizures were seen in this record. If events of interest are noted, please press the event button. Clinical correlation is advised.     Najma Del Valle MD  Staff Epilepsy Neurologist   [phone]

## 2020-12-31 NOTE — PLAN OF CARE
PT/OT: Per chart and confirmed with RN, pt is intubated and sedated, not appropriate for therapies today.

## 2020-12-31 NOTE — PROGRESS NOTES
Critical Care Progress Note      12/31/2020    Name: Stevie Dotson MRN#: [id number]   Age: 65 year old YOB: 1955     [facility]    [facility] DAY #1             Problem List:   Active Problems:    Encephalitis    Meningitis  Loss of protective airway reflexes  encephalopathy         Summary/Hospital Course:     65M without marked pmh now presented with encephalopathy in setting of MRI results suggesting recent seizure and LP result suggesting meningitis.  Worsening encephalopathy on floor resulting in loss of airway protective reflexes requiring intubation for airway protection.  Unable to obtain further history at this time as pt is intubated/sedated.      Assessment and plan :     Stevie Dotson IS a 65 year old male admitted on 12/30/2020 for meningitis and encephalopathy requiring intubation for airway protection.   I have personally reviewed the daily labs, imaging studies, cultures and discussed the case with referring physician and consulting physicians.   My assessment and plan by system for this patient is as follows:    Neurology/Psychiatry:   1. Sedation:  Propofol, prn midazolam  2. Analgesia: prn dilaudid  3. Encephalopathy/encephalitis:  In setting of meningitis noted below.    4.  Seizures:  Admission MRI c/w active seizure vs post-ictal.  No further seizures on EEG.  Appreciate neuro input.  Continue keppra and fosphenytoin.    Cardiovascular:   1.  Lactic acidosis:  Resolved 2.9 --> 1.8.  2.  bp acceptable for now. Antihypertensives vs pressors prn.    Pulmonary/Ventilator Management:   1.  Loss of protective airway reflexes:  In setting of encephalopathy noted above.  Inappropriate for extubation today.    GI and Nutrition :   1. Initiate TF today  2. PPI for pud prophy    Renal/Fluids/Electrolytes:   1. MELE: Creatinine rising 1.04 but UOP adequate.    Infectious Disease:   1. Bacteremia:  Strep pneumoniae on pcr testing.  On ctx and vanco;  Appreciate ID input.  2.  Meningitis:  Gm pos cocci in csf--likely to be strep pneumo.  Continue vanco/ctx; narrow out other antimicrobials.  Continue dex 10 q6hr for 4 days.    Endocrine:   1. Hyperglycemia:  Sliding scale insulin.  Added lantus for coverage while on steroids, can probably discontinue this once steroids complete.    Hematology/Oncology:   1. Wbc elevated 25 -- in setting of meningitis  2. hgb 13.8 ok  3. pltlts 262 ok    ICU Prophylaxis:   1. DVT: Hep Subq/mechanical  2. VAP: HOB 30 degrees, chlorhexidine rinse  3. Stress Ulcer: PPI/H2 blocker  4. Restraints: Nonviolent soft two point restraints required and necessary for patient safety and continued cares and good effect as patient continues to pull at necessary lines, tubes despite education and distraction. Will readdress daily.   5. Wound care - per unit routine   6. Feeding - tf  7. Family Update: wife by phone  8. Disposition - icu           Interim History:     No events overnight.  No further seizures on eeg.  Unable to obtain history directly due to encephalopathy.         Key Medications:       sodium chloride 0.9%  500 mL Intravenous Once     sodium chloride 0.9 %  100 mL Intravenous Once     cefTRIAXone  2 g Intravenous Q12H     chlorhexidine  15 mL Mouth/Throat Q12H     dexamethasone  10 mg Intravenous Q6H     fosphenytoin (CEREBYX) IV (loading dose)  2 mg PE/kg (Adjusted) Intravenous Q8H     heparin ANTICOAGULANT  5,000 Units Subcutaneous Q8H     insulin aspart  1-6 Units Subcutaneous Q4H     iopamidol  120 mL Intravenous Once     levETIRAcetam  1,000 mg Intravenous Q12H     pantoprazole  40 mg Oral Daily     potassium & sodium phosphates  2 packet Oral or Feeding Tube TID     sodium chloride (PF)  3 mL Intracatheter Q8H     sodium chloride (PF)  3 mL Intracatheter Q8H     vancomycin (VANCOCIN) IV  2,000 mg Intravenous Q12H       lactated ringers 100 mL/hr at 12/31/20 0916     propofol (DIPRIVAN) infusion 50 mcg/kg/min (12/31/20 1218)               Physical  Examination:   Temp:  [96.1  F (35.6  C)-105.1  F (40.6  C)] 96.4  F (35.8  C)  Pulse:  [] 60  Resp:  [10-35] 23  BP: ()/(54-99) 98/65  FiO2 (%):  [30 %-60 %] 30 %  SpO2:  [92 %-100 %] 96 %  No intake or output data in the 24 hours ending 12/30/20 1600  Wt Readings from Last 4 Encounters:   12/31/20 114 kg (251 lb 5.2 oz)   12/30/20 113.4 kg (250 lb)   03/08/19 112.9 kg (249 lb)   11/14/18 115.4 kg (254 lb 8 oz)     BP - Mean:  [63-90] 76  Ventilation Mode: CMV/AC  (Continuous Mandatory Ventilation/ Assist Control)  FiO2 (%): 30 %  Rate Set (breaths/minute): 16 breaths/min  Tidal Volume Set (mL): 500 mL  PEEP (cm H2O): 5 cmH2O  Oxygen Concentration (%): 30 %  Resp: 23    Recent Labs   Lab 12/30/20  1640 12/30/20  0830   PH 7.41  --    PCO2 35  --    PO2 93  --    HCO3 22  --    O2PER  --  Room Air       GEN: sedated  HEENT: head ncat, sclera anicteric, OP patent, trachea midline   PULM: unlabored synchronous with vent, clear anteriorly    CV/COR: RRR S1S2 no gallop,  No rub, no murmur  ABD: soft nontender, hypoactive bowel sounds, no mass  EXT:  Minimal Edema; warm x4  NEURO: sedated which limits exam  SKIN: no obvious rash  LINES: clean, dry intact         Data:   All data and imaging reviewed     ROUTINE ICU LABS (Last four results)  CMP  Recent Labs   Lab 12/31/20  0442 12/30/20  0830    138   POTASSIUM 3.5 3.8   CHLORIDE 110* 104   CO2 24 29   ANIONGAP 5 5   * 224*   BUN 17 17   CR 1.04 0.89   GFRESTIMATED 75 90   GFRESTBLACK 87 >90   HERNESTO 8.1* 9.2   MAG 2.6*  --    PHOS 1.8*  --    PROTTOTAL 6.8 8.3   ALBUMIN 2.3* 3.3*   BILITOTAL 0.8 1.8*   ALKPHOS 51 78   AST 44 38   ALT 57 70     CBC  Recent Labs   Lab 12/31/20  0442 12/30/20  0830   WBC 25.7* 28.8*   RBC 4.59 5.12   HGB 13.8 15.7   HCT 42.1 47.6   MCV 92 93   MCH 30.1 30.7   MCHC 32.8 33.0   RDW 14.5 14.0    329     INRNo lab results found in last 7 days.  Arterial Blood Gas  Recent Labs   Lab 12/30/20  1640 12/30/20  0830    PH 7.41  --    PCO2 35  --    PO2 93  --    HCO3 22  --    O2PER  --  Room Air       All cultures:  Recent Labs   Lab 12/30/20  2302 12/30/20  1033 12/30/20  0953 12/30/20  0937 12/30/20  0830   CULT No growth after 3 hours No growth after 17 hours Moderate growth  Streptococcus pneumoniae  Susceptibility testing in progress  *  Critical Value/Significant Value, preliminary result only, called to and read back by  Janis Zafar RN at 1014 [email or web address]   No growth Cultured on the 1st day of incubation:  Gram positive cocci in pairs and chains  Susceptibility testing in progress  *  Critical Value/Significant Value, preliminary result only, called to and read back by  Abdullahi Brooks RN. @2321. 12.30.20. .     (Note)  POSITIVE for STREPTOCOCCUS PNEUMONIAE by [employer] multiplex nucleic  acid test. Note: Streptococcus mitis group is known to cross react  with S. pneumoniae. Correlation with culture results and clinical  presentation is necessary. Final identification and antimicrobial  susceptibility testing will be verified by standard methods.    Specimen tested with [employer]igene multiplex, gram-positive blood culture  nucleic acid test for the following targets: Staph aureus, Staph  epidermidis, Staph lugdunensis, other Staph species, Enterococcus  faecalis, Enterococcus faecium, Streptococcus species, S. agalactiae,  S. anginosus grp., S. pneumoniae, S. pyogenes, Listeria sp., mecA  (methicillin resistance) and Robert/B (vancomycin resistance).    Critical Value/Significant Value called to and read back by Abdullahi Brooks at 0205 on 12.31.20 by SS.       Recent Results (from the past 24 hour(s))   XR Chest Port 1 View    Narrative    CHEST PORTABLE ONE VIEW   12/30/2020 5:17 PM     HISTORY: Post intubation.    COMPARISON: Chest x-ray 12/30/2020.      Impression    IMPRESSION: Portable chest. Lungs are clear. Heart is normal in size.  No pneumothorax. No definite pleural effusions. Endotracheal tube  terminates  approximately 4 cm above the lelia. Nasogastric tube  extends below the left hemidiaphragm presumably in the gastric lumen.    LEDA RAMIREZ MD     Labs (personally reviewed/interpreted):  See a/p    D/w Dr. Palma of ID and Dr. Guardado of neuro    Billing: This patient is critically ill: Yes. Total critical care time today 45 min.

## 2020-12-31 NOTE — PROGRESS NOTES
[facility] ICU RESPIRATORY NOTE        Date of Admission: 12/30/2020    Date of Intubation (most recent): 12/30/2020    Reason for Mechanical Ventilation: Airway protection     Number of Days on Mechanical Ventilation: 2    Met Criteria for Spontaneous Breathing Trial: No   Reason for No Spontaneous Breathing Trial: Per MD     Significant Events Today: none overnight     ABG Results:   Recent Labs   Lab 12/30/20  1640 12/30/20  0830   PH 7.41  --    PCO2 35  --    PO2 93  --    HCO3 22  --    O2PER  --  Room Air       Current Vent Settings: Ventilation Mode: CMV/AC  (Continuous Mandatory Ventilation/ Assist Control)  FiO2 (%): 30 %  Rate Set (breaths/minute): 16 breaths/min  Tidal Volume Set (mL): 500 mL  PEEP (cm H2O): 5 cmH2O  Oxygen Concentration (%): 30 %  Resp: 25      Plan: Continue on full ventilator support.     Israel Campa, RT

## 2021-01-01 ENCOUNTER — HOSPITAL ENCOUNTER (OUTPATIENT)
Dept: NEUROLOGY | Facility: CLINIC | Age: 66
DRG: 871 | End: 2021-01-01
Attending: NURSE PRACTITIONER
Payer: COMMERCIAL

## 2021-01-01 LAB
ALBUMIN SERPL-MCNC: 2 G/DL (ref 3.4–5)
ALP SERPL-CCNC: 48 U/L (ref 40–150)
ALT SERPL W P-5'-P-CCNC: 60 U/L (ref 0–70)
ANION GAP SERPL CALCULATED.3IONS-SCNC: 5 MMOL/L (ref 3–14)
AST SERPL W P-5'-P-CCNC: 62 U/L (ref 0–45)
BACTERIA SPEC CULT: ABNORMAL
BILIRUB SERPL-MCNC: 0.4 MG/DL (ref 0.2–1.3)
BUN SERPL-MCNC: 22 MG/DL (ref 7–30)
CALCIUM SERPL-MCNC: 8.1 MG/DL (ref 8.5–10.1)
CHLORIDE SERPL-SCNC: 113 MMOL/L (ref 94–109)
CO2 SERPL-SCNC: 27 MMOL/L (ref 20–32)
CREAT SERPL-MCNC: 0.93 MG/DL (ref 0.66–1.25)
ERYTHROCYTE [DISTWIDTH] IN BLOOD BY AUTOMATED COUNT: 14.6 % (ref 10–15)
GALACTOMANNAN AG SERPL QL IA: NEGATIVE
GALACTOMANNAN AG SERPL-ACNC: 0.05
GFR SERPL CREATININE-BSD FRML MDRD: 86 ML/MIN/{1.73_M2}
GLUCOSE BLDC GLUCOMTR-MCNC: 157 MG/DL (ref 70–99)
GLUCOSE BLDC GLUCOMTR-MCNC: 163 MG/DL (ref 70–99)
GLUCOSE BLDC GLUCOMTR-MCNC: 164 MG/DL (ref 70–99)
GLUCOSE BLDC GLUCOMTR-MCNC: 179 MG/DL (ref 70–99)
GLUCOSE BLDC GLUCOMTR-MCNC: 189 MG/DL (ref 70–99)
GLUCOSE SERPL-MCNC: 172 MG/DL (ref 70–99)
HCT VFR BLD AUTO: 38.4 % (ref 40–53)
HGB BLD-MCNC: 12.6 G/DL (ref 13.3–17.7)
MAGNESIUM SERPL-MCNC: 2.7 MG/DL (ref 1.6–2.3)
MCH RBC QN AUTO: 29.8 PG (ref 26.5–33)
MCHC RBC AUTO-ENTMCNC: 32.8 G/DL (ref 31.5–36.5)
MCV RBC AUTO: 91 FL (ref 78–100)
PHENYTOIN SERPL-MCNC: 23.1 MG/L (ref 10–20)
PHOSPHATE SERPL-MCNC: 2.8 MG/DL (ref 2.5–4.5)
PLATELET # BLD AUTO: 273 10E9/L (ref 150–450)
POTASSIUM SERPL-SCNC: 3.6 MMOL/L (ref 3.4–5.3)
PROT SERPL-MCNC: 6.3 G/DL (ref 6.8–8.8)
RBC # BLD AUTO: 4.23 10E12/L (ref 4.4–5.9)
SODIUM SERPL-SCNC: 145 MMOL/L (ref 133–144)
SPECIMEN SOURCE: ABNORMAL
WBC # BLD AUTO: 27.7 10E9/L (ref 4–11)

## 2021-01-01 PROCEDURE — 250N000011 HC RX IP 250 OP 636: Performed by: INTERNAL MEDICINE

## 2021-01-01 PROCEDURE — 250N000013 HC RX MED GY IP 250 OP 250 PS 637: Performed by: NURSE PRACTITIONER

## 2021-01-01 PROCEDURE — 99291 CRITICAL CARE FIRST HOUR: CPT | Performed by: INTERNAL MEDICINE

## 2021-01-01 PROCEDURE — 999N000009 HC STATISTIC AIRWAY CARE

## 2021-01-01 PROCEDURE — 84100 ASSAY OF PHOSPHORUS: CPT | Performed by: INTERNAL MEDICINE

## 2021-01-01 PROCEDURE — 250N000011 HC RX IP 250 OP 636: Performed by: NURSE PRACTITIONER

## 2021-01-01 PROCEDURE — 250N000013 HC RX MED GY IP 250 OP 250 PS 637: Performed by: INTERNAL MEDICINE

## 2021-01-01 PROCEDURE — 999N000052 EEG VIDEO 12-26 HR UNMONITORED

## 2021-01-01 PROCEDURE — 999N001017 HC STATISTIC GLUCOSE BY METER IP

## 2021-01-01 PROCEDURE — 83735 ASSAY OF MAGNESIUM: CPT | Performed by: INTERNAL MEDICINE

## 2021-01-01 PROCEDURE — 95720 EEG PHY/QHP EA INCR W/VEEG: CPT | Performed by: PSYCHIATRY & NEUROLOGY

## 2021-01-01 PROCEDURE — 200N000001 HC R&B ICU

## 2021-01-01 PROCEDURE — 94003 VENT MGMT INPAT SUBQ DAY: CPT

## 2021-01-01 PROCEDURE — 250N000012 HC RX MED GY IP 250 OP 636 PS 637: Performed by: INTERNAL MEDICINE

## 2021-01-01 PROCEDURE — 80053 COMPREHEN METABOLIC PANEL: CPT | Performed by: INTERNAL MEDICINE

## 2021-01-01 PROCEDURE — 258N000003 HC RX IP 258 OP 636: Performed by: INTERNAL MEDICINE

## 2021-01-01 PROCEDURE — 99232 SBSQ HOSP IP/OBS MODERATE 35: CPT | Mod: 25 | Performed by: PSYCHIATRY & NEUROLOGY

## 2021-01-01 PROCEDURE — 258N000002 HC RX IP 258 OP 250: Performed by: INTERNAL MEDICINE

## 2021-01-01 PROCEDURE — 250N000009 HC RX 250: Performed by: INTERNAL MEDICINE

## 2021-01-01 PROCEDURE — 36415 COLL VENOUS BLD VENIPUNCTURE: CPT | Performed by: INTERNAL MEDICINE

## 2021-01-01 PROCEDURE — 999N000157 HC STATISTIC RCP TIME EA 10 MIN

## 2021-01-01 PROCEDURE — 85027 COMPLETE CBC AUTOMATED: CPT | Performed by: INTERNAL MEDICINE

## 2021-01-01 PROCEDURE — 80185 ASSAY OF PHENYTOIN TOTAL: CPT | Performed by: STUDENT IN AN ORGANIZED HEALTH CARE EDUCATION/TRAINING PROGRAM

## 2021-01-01 PROCEDURE — 36415 COLL VENOUS BLD VENIPUNCTURE: CPT | Performed by: STUDENT IN AN ORGANIZED HEALTH CARE EDUCATION/TRAINING PROGRAM

## 2021-01-01 PROCEDURE — 258N000003 HC RX IP 258 OP 636: Performed by: STUDENT IN AN ORGANIZED HEALTH CARE EDUCATION/TRAINING PROGRAM

## 2021-01-01 PROCEDURE — 250N000011 HC RX IP 250 OP 636: Performed by: STUDENT IN AN ORGANIZED HEALTH CARE EDUCATION/TRAINING PROGRAM

## 2021-01-01 PROCEDURE — 80186 ASSAY OF PHENYTOIN FREE: CPT | Performed by: STUDENT IN AN ORGANIZED HEALTH CARE EDUCATION/TRAINING PROGRAM

## 2021-01-01 RX ORDER — SODIUM CHLORIDE 450 MG/100ML
INJECTION, SOLUTION INTRAVENOUS CONTINUOUS
Status: DISCONTINUED | OUTPATIENT
Start: 2021-01-01 | End: 2021-01-02

## 2021-01-01 RX ORDER — POTASSIUM CHLORIDE 1.5 G/1.58G
20 POWDER, FOR SOLUTION ORAL ONCE
Status: COMPLETED | OUTPATIENT
Start: 2021-01-01 | End: 2021-01-01

## 2021-01-01 RX ORDER — DEXMEDETOMIDINE HYDROCHLORIDE 4 UG/ML
0.2-0.7 INJECTION, SOLUTION INTRAVENOUS CONTINUOUS
Status: DISCONTINUED | OUTPATIENT
Start: 2021-01-01 | End: 2021-01-02

## 2021-01-01 RX ADMIN — HYDROMORPHONE HYDROCHLORIDE 0.5 MG: 1 INJECTION, SOLUTION INTRAMUSCULAR; INTRAVENOUS; SUBCUTANEOUS at 14:42

## 2021-01-01 RX ADMIN — HEPARIN SODIUM 5000 UNITS: 5000 INJECTION, SOLUTION INTRAVENOUS; SUBCUTANEOUS at 04:42

## 2021-01-01 RX ADMIN — SODIUM CHLORIDE 175 MG PE: 9 INJECTION, SOLUTION INTRAVENOUS at 05:10

## 2021-01-01 RX ADMIN — INSULIN ASPART 1 UNITS: 100 INJECTION, SOLUTION INTRAVENOUS; SUBCUTANEOUS at 19:29

## 2021-01-01 RX ADMIN — HYDROMORPHONE HYDROCHLORIDE 0.5 MG: 1 INJECTION, SOLUTION INTRAMUSCULAR; INTRAVENOUS; SUBCUTANEOUS at 08:44

## 2021-01-01 RX ADMIN — SODIUM CHLORIDE 175 MG PE: 9 INJECTION, SOLUTION INTRAVENOUS at 13:40

## 2021-01-01 RX ADMIN — DEXAMETHASONE SODIUM PHOSPHATE 10 MG: 4 INJECTION, SOLUTION INTRAMUSCULAR; INTRAVENOUS at 04:42

## 2021-01-01 RX ADMIN — SODIUM CHLORIDE 175 MG PE: 9 INJECTION, SOLUTION INTRAVENOUS at 21:44

## 2021-01-01 RX ADMIN — DEXAMETHASONE SODIUM PHOSPHATE 10 MG: 4 INJECTION, SOLUTION INTRAMUSCULAR; INTRAVENOUS at 21:05

## 2021-01-01 RX ADMIN — PANTOPRAZOLE SODIUM 40 MG: 40 TABLET, DELAYED RELEASE ORAL at 06:31

## 2021-01-01 RX ADMIN — DEXAMETHASONE SODIUM PHOSPHATE 10 MG: 4 INJECTION, SOLUTION INTRAMUSCULAR; INTRAVENOUS at 15:41

## 2021-01-01 RX ADMIN — LEVETIRACETAM 1000 MG: 10 INJECTION INTRAVENOUS at 08:43

## 2021-01-01 RX ADMIN — PROPOFOL 10 MG: 10 INJECTION, EMULSION INTRAVENOUS at 23:17

## 2021-01-01 RX ADMIN — PROPOFOL 75 MCG/KG/MIN: 10 INJECTION, EMULSION INTRAVENOUS at 10:40

## 2021-01-01 RX ADMIN — CEFTRIAXONE SODIUM 2 G: 2 INJECTION, POWDER, FOR SOLUTION INTRAMUSCULAR; INTRAVENOUS at 21:04

## 2021-01-01 RX ADMIN — CHLORHEXIDINE GLUCONATE 0.12% ORAL RINSE 15 ML: 1.2 LIQUID ORAL at 08:44

## 2021-01-01 RX ADMIN — INSULIN ASPART 1 UNITS: 100 INJECTION, SOLUTION INTRAVENOUS; SUBCUTANEOUS at 15:41

## 2021-01-01 RX ADMIN — Medication 2 PACKET: at 21:09

## 2021-01-01 RX ADMIN — PROPOFOL 20 MCG/KG/MIN: 10 INJECTION, EMULSION INTRAVENOUS at 21:13

## 2021-01-01 RX ADMIN — PROPOFOL 45 MCG/KG/MIN: 10 INJECTION, EMULSION INTRAVENOUS at 05:57

## 2021-01-01 RX ADMIN — INSULIN ASPART 1 UNITS: 100 INJECTION, SOLUTION INTRAVENOUS; SUBCUTANEOUS at 11:03

## 2021-01-01 RX ADMIN — PROPOFOL 45 MCG/KG/MIN: 10 INJECTION, EMULSION INTRAVENOUS at 02:42

## 2021-01-01 RX ADMIN — PROPOFOL 75 MCG/KG/MIN: 10 INJECTION, EMULSION INTRAVENOUS at 12:56

## 2021-01-01 RX ADMIN — VANCOMYCIN HYDROCHLORIDE 2000 MG: 5 INJECTION, POWDER, LYOPHILIZED, FOR SOLUTION INTRAVENOUS at 10:52

## 2021-01-01 RX ADMIN — INSULIN ASPART 1 UNITS: 100 INJECTION, SOLUTION INTRAVENOUS; SUBCUTANEOUS at 08:50

## 2021-01-01 RX ADMIN — PROPOFOL 75 MCG/KG/MIN: 10 INJECTION, EMULSION INTRAVENOUS at 08:43

## 2021-01-01 RX ADMIN — INSULIN ASPART 1 UNITS: 100 INJECTION, SOLUTION INTRAVENOUS; SUBCUTANEOUS at 03:59

## 2021-01-01 RX ADMIN — POTASSIUM CHLORIDE 20 MEQ: 1.5 POWDER, FOR SOLUTION ORAL at 06:20

## 2021-01-01 RX ADMIN — Medication 2 PACKET: at 15:43

## 2021-01-01 RX ADMIN — MULTIVITAMIN 15 ML: LIQUID ORAL at 08:43

## 2021-01-01 RX ADMIN — CEFTRIAXONE SODIUM 2 G: 2 INJECTION, POWDER, FOR SOLUTION INTRAMUSCULAR; INTRAVENOUS at 09:11

## 2021-01-01 RX ADMIN — CHLORHEXIDINE GLUCONATE 0.12% ORAL RINSE 15 ML: 1.2 LIQUID ORAL at 19:26

## 2021-01-01 RX ADMIN — LEVETIRACETAM 1000 MG: 10 INJECTION INTRAVENOUS at 19:26

## 2021-01-01 RX ADMIN — INSULIN GLARGINE 10 UNITS: 100 INJECTION, SOLUTION SUBCUTANEOUS at 06:31

## 2021-01-01 RX ADMIN — HYDROMORPHONE HYDROCHLORIDE 0.5 MG: 1 INJECTION, SOLUTION INTRAMUSCULAR; INTRAVENOUS; SUBCUTANEOUS at 12:57

## 2021-01-01 RX ADMIN — HEPARIN SODIUM 5000 UNITS: 5000 INJECTION, SOLUTION INTRAVENOUS; SUBCUTANEOUS at 20:59

## 2021-01-01 RX ADMIN — DEXMEDETOMIDINE HYDROCHLORIDE 0.4 MCG/KG/HR: 4 INJECTION, SOLUTION INTRAVENOUS at 10:50

## 2021-01-01 RX ADMIN — SODIUM CHLORIDE, POTASSIUM CHLORIDE, SODIUM LACTATE AND CALCIUM CHLORIDE: 600; 310; 30; 20 INJECTION, SOLUTION INTRAVENOUS at 04:44

## 2021-01-01 RX ADMIN — HEPARIN SODIUM 5000 UNITS: 5000 INJECTION, SOLUTION INTRAVENOUS; SUBCUTANEOUS at 12:56

## 2021-01-01 RX ADMIN — SODIUM CHLORIDE: 4.5 INJECTION, SOLUTION INTRAVENOUS at 11:58

## 2021-01-01 RX ADMIN — DEXAMETHASONE SODIUM PHOSPHATE 10 MG: 4 INJECTION, SOLUTION INTRAMUSCULAR; INTRAVENOUS at 09:11

## 2021-01-01 RX ADMIN — PROPOFOL 40 MCG/KG/MIN: 10 INJECTION, EMULSION INTRAVENOUS at 15:57

## 2021-01-01 RX ADMIN — Medication 2 PACKET: at 09:11

## 2021-01-01 RX ADMIN — PROPOFOL 75 MCG/KG/MIN: 10 INJECTION, EMULSION INTRAVENOUS at 14:42

## 2021-01-01 ASSESSMENT — ACTIVITIES OF DAILY LIVING (ADL)
ADLS_ACUITY_SCORE: 26

## 2021-01-01 ASSESSMENT — MIFFLIN-ST. JEOR: SCORE: 1978.88

## 2021-01-01 NOTE — PROGRESS NOTES
Preliminary Video EEG impression on December 31, 2020- January 1, 2021  Until 9am     This video EEG is abnormal due to the presences of continuous generalized polymorphic delta/theta slowing with superimposed alpha activity.  There is a persistent asymmetry with right hemisphere being more attenuated compared to the left hemisphere.  Additionally the left hemisphere has variable frequencies up to 13 Hz throughout the file.  There is no clear parieto-occipital rhythm or well formed sleep architecture. EEG is  reactive with stimulation.     This EEG is consistent with a severe diffuse encephalopathy with maximum cortical dysfunction in the right hemisphere. No epileptiform discharges or electrographic seizures were seen in this record. If events of interest are noted, please press the event button. Clinical correlation is advised.     Najma Del Valle MD  Staff Epilepsy Neurologist   [phone]

## 2021-01-01 NOTE — PROGRESS NOTES
[facility]    Infectious Disease Progress Note    Date of Service (when I saw the patient): 01/01/2021     Assessment & Plan   Stevie Dotson is a 65 year old male who was admitted on 12/30/2020.     Impression:  1. 65 y.o male with admitted with confusion and fever.   2. Progressive encephalopathy in setting of MRI results suggesting recent seizure   3. LP done suggestive of meningitis.    4. Started on broad spectrum antimicrobials and steroids   5. Intubated due to progressive encephalopathy.   6. CSF cultures now coming back with GPC In pairs and chains which along with positive blood cultures for step pneumo explains the presentation.        Recommendations:   Continue on Ceftriaxone BID dosing alone can stop vancomycin.   Steroids at the least for 4 days for meningitis, more per ICU     Ria Palma MD    Interval History   Cultures as listed   Stays intubated   Fever jessica r      Physical Exam   Temp: 97.9  F (36.6  C) Temp src: Bladder BP: (!) 81/52 Pulse: 55   Resp: 16 SpO2: 95 % O2 Device: Mechanical Ventilator    Vitals:    12/30/20 1030 12/31/20 0600 01/01/21 0400   Weight: 113 kg (249 lb 1.9 oz) 114 kg (251 lb 5.2 oz) 114 kg (251 lb 5.2 oz)     Vital Signs with Ranges  Temp:  [96.6  F (35.9  C)-99.7  F (37.6  C)] 97.9  F (36.6  C)  Pulse:  [55-77] 55  Resp:  [0-22] 16  BP: ()/(52-79) 81/52  FiO2 (%):  [30 %] 30 %  SpO2:  [94 %-100 %] 95 %    Constitutional: intubated and sedated   Lungs: Clear to auscultation bilaterally, no crackles or wheezing  Cardiovascular: Regular rate and rhythm, normal S1 and S2, and no murmur noted  Abdomen: Normal bowel sounds, soft, non-distended, non-tender  Skin: No rashes, no cyanosis, no edema  Other:    Medications     dexmedetomidine 0.4 mcg/kg/hr (01/01/21 1050)     propofol (DIPRIVAN) infusion 75 mcg/kg/min (01/01/21 1040)     NaCl 75 mL/hr at 01/01/21 1158     sodium chloride 10 mL/hr at 01/01/21 0800       sodium chloride 0.9%   500 mL Intravenous Once     sodium chloride 0.9 %  100 mL Intravenous Once     cefTRIAXone  2 g Intravenous Q12H     chlorhexidine  15 mL Mouth/Throat Q12H     dexamethasone  10 mg Intravenous Q6H     fosphenytoin (CEREBYX) IV (loading dose)  2 mg PE/kg (Adjusted) Intravenous Q8H     heparin ANTICOAGULANT  5,000 Units Subcutaneous Q8H     insulin aspart  1-6 Units Subcutaneous Q4H     insulin glargine  10 Units Subcutaneous QAM AC     iopamidol  120 mL Intravenous Once     levETIRAcetam  1,000 mg Intravenous Q12H     multivitamins w/minerals  15 mL Per Feeding Tube Daily     pantoprazole  40 mg Oral Daily     protein modular  2 packet Per Feeding Tube Q6H     sodium chloride (PF)  3 mL Intracatheter Q8H     sodium chloride (PF)  3 mL Intracatheter Q8H     vancomycin (VANCOCIN) IV  2,000 mg Intravenous Q12H       Data   All microbiology laboratory data reviewed.  Recent Labs   Lab Test 01/01/21  0533 12/31/20  0442 12/30/20  0830   WBC 27.7* 25.7* 28.8*   HGB 12.6* 13.8 15.7   HCT 38.4* 42.1 47.6   MCV 91 92 93    262 329     Recent Labs   Lab Test 01/01/21  0533 12/31/20  0442 12/30/20  0830   CR 0.93 1.04 0.89     No lab results found.  Recent Labs   Lab Test 12/30/20  2302 12/30/20  1033 12/30/20  0953 12/30/20  0937 12/30/20  0830   CULT No growth after 1 day No growth after 2 days Moderate growth  Streptococcus pneumoniae  *  Critical Value/Significant Value, preliminary result only, called to and read back by  Janis Zafar RN at 1014 [email or web address]   No growth Cultured on the 1st day of incubation:  Streptococcus pneumoniae  *  Critical Value/Significant Value, preliminary result only, called to and read back by  Abdullahi Brooks RN. @2321. 12.30.20. BS.     (Note)  POSITIVE for STREPTOCOCCUS PNEUMONIAE by [employer] multiplex nucleic  acid test. Note: Streptococcus mitis group is known to cross react  with S. pneumoniae. Correlation with culture results and clinical  presentation is necessary. Final  identification and antimicrobial  susceptibility testing will be verified by standard methods.    Specimen tested with [employer] multiplex, gram-positive blood culture  nucleic acid test for the following targets: Staph aureus, Staph  epidermidis, Staph lugdunensis, other Staph species, Enterococcus  faecalis, Enterococcus faecium, Streptococcus species, S. agalactiae,  S. anginosus grp., S. pneumoniae, S. pyogenes, Listeria sp., mecA  (methicillin resistance) and Robert/B (vancomycin resistance).    Critical Value/Significant Value called to and read back by Abdullahi Brooks at 0205 on 12.31.20 by .         Attestation:  Total time on the floor involved in the patient's care: 35 minutes. Total time spent in counseling/care coordination: >50%

## 2021-01-01 NOTE — PHARMACY-VANCOMYCIN DOSING SERVICE
Pharmacy Vancomycin Note  Date of Service 2020  Patient's  1955   65 year old, male    Indication: Meningitis  Goal Trough Level: 15-20 mg/L  Day of Therapy: 2  Current Vancomycin regimen:  2000 mg IV q12h    Current estimated CrCl = Estimated Creatinine Clearance: 93.6 mL/min (based on SCr of 1.04 mg/dL).    Creatinine for last 3 days  2020:  8:30 AM Creatinine 0.89 mg/dL  2020:  4:42 AM Creatinine 1.04 mg/dL    Recent Vancomycin Levels (past 3 days)  2020: 10:13 PM Vancomycin Level 17.6 mg/L    Vancomycin IV Administrations (past 72 hours)                   vancomycin 2000 mg in 0.9% NaCl 500 ml intermittent infusion 2,000 mg (mg) 2,000 mg Given 20 1118     2,000 mg Given 20 2211    vancomycin 2500 mg in 0.9% NaCl 500 ml intermittent infusion 2,500 mg (mg) 2,500 mg Given 20 1130                Nephrotoxins and other renal medications (From now, onward)    Start     Dose/Rate Route Frequency Ordered Stop    20 2300  vancomycin 2000 mg in 0.9% NaCl 500 ml intermittent infusion 2,000 mg      2,000 mg  over 90 Minutes Intravenous EVERY 12 HOURS 20 1337               Contrast Orders - past 72 hours (72h ago, onward)    Start     Dose/Rate Route Frequency Ordered Stop    20 1215  gadobutrol (GADAVIST) injection 15 mL      15 mL Intravenous ONCE 20 1211 20 1212    20 1035  iopamidol (ISOVUE-370) solution 120 mL      120 mL Intravenous ONCE 20 1033            Interpretation of levels and current regimen:  Trough level is  Therapeutic  Has serum creatinine changed > 50% in last 72 hours: No  Urine output:  good urine output  Renal Function: Stable    Plan:  1.  Continue Current Dose  2.  Pharmacy will check trough levels as appropriate in 3-5 Days.    3. Serum creatinine levels will be ordered daily for the first week of therapy and at least twice weekly for subsequent weeks.      Jakob Guido RPH        .

## 2021-01-01 NOTE — PROGRESS NOTES
[facility] ICU RESPIRATORY NOTE        Date of Admission: 12/30/2020    Date of Intubation (most recent): 12/30/20    Reason for Mechanical Ventilation: airway protection    Number of Days on Mechanical Ventilation: 2    Met Criteria for Spontaneous Breathing Trial:No   Reason for No Spontaneous Breathing Trial: Per MD        ABG Results:   Recent Labs   Lab 12/30/20  1640 12/30/20  0830   PH 7.41  --    PCO2 35  --    PO2 93  --    HCO3 22  --    O2PER  --  Room Air       Current Vent Settings: Ventilation Mode: CMV/AC  (Continuous Mandatory Ventilation/ Assist Control)  FiO2 (%): 30 %  Rate Set (breaths/minute): 16 breaths/min  Tidal Volume Set (mL): 500 mL  PEEP (cm H2O): 5 cmH2O  Oxygen Concentration (%): 30 %  Resp: 16          Plan:   Keep the patient on full support and monitor    Jayne Rivera, RT

## 2021-01-01 NOTE — PROGRESS NOTES
End day 2 and restart day 3 new eeg number new year  [id number] LTV DAY 3 ---SEDATED  DR CAMERON/BOB FOLLOWING

## 2021-01-01 NOTE — PLAN OF CARE
Remains vented and sedated. Too much sedation=bradycardia and hypotension. Reduced propofol and stopped precedex. OG to LIS until output decreases and then can start TF.

## 2021-01-01 NOTE — PROGRESS NOTES
"    [facility]    Stroke/Neurocritical Care Progress Note    Interval Events  Afebrile, Tmax 99.7, persistent leukocytosis, blood and CSF growing strep pneumo, but no cultures positive since presenting vials. No seizures on EEG, continues to be encephalopathic.    Impression  1. Strep pneumoniae meningitis and bacteremia, source as of yet unidentified, however most likely pulmonary per ID. Consider spine imaging if source control not achieved.  2. Right hemispheric focal slowing consistent with post-ictal encephalopathy  3. Obtundation, secondary to above and related to sedative effect    Plan  Continue ceftri per ID  Continue keppra 1 g IV q12  Continue fosphenytoin 175 mg IV q8  Trough levels pending  Continue vEEG  Attempt to achieve vent synchrony with precedex, fentanyl as opposed to high rate of infusion propofol alone for risk of BESSY and as long term infusion increases storage in fat and will prolong resolution of encephalopathy   Continue Q2 hour neurochecks    The Stroke/Neurocritical Care Staff is Dr. Wong.    Danyelle Guardado MD  Vascular Neurology Fellow  To page me or covering stroke neurology team member, click here: AMCOM   Choose \"On Call\" tab at top, then search dropdown box for \"Neurology Adult\", select location, press Enter, then look for stroke/neuro ICU/telestroke.    ______________________________________________________    Medications   Home Meds  Prior to Admission medications    Medication Sig Start Date End Date Taking? Authorizing Provider   fluticasone (FLONASE) 50 MCG/ACT nasal spray Spray 1 spray into both nostrils 2 times daily   Yes Unknown, Entered By History   omeprazole (PRILOSEC) 20 MG DR capsule Take 20 mg by mouth daily   Yes Unknown, Entered By History   oxymetazoline (AFRIN) 0.05 % nasal spray Spray 2 sprays into both nostrils 2 times daily   Yes Unknown, Entered By History   sildenafil (VIAGRA) 100 MG tablet Take 1 tablet (100 mg) by mouth daily as " needed (ED) 3/8/19  Yes Agusto Holguin MD       Scheduled Meds    sodium chloride 0.9%  500 mL Intravenous Once     sodium chloride 0.9 %  100 mL Intravenous Once     cefTRIAXone  2 g Intravenous Q12H     chlorhexidine  15 mL Mouth/Throat Q12H     dexamethasone  10 mg Intravenous Q6H     fosphenytoin (CEREBYX) IV (loading dose)  2 mg PE/kg (Adjusted) Intravenous Q8H     heparin ANTICOAGULANT  5,000 Units Subcutaneous Q8H     insulin aspart  1-6 Units Subcutaneous Q4H     insulin glargine  10 Units Subcutaneous QAM AC     iopamidol  120 mL Intravenous Once     levETIRAcetam  1,000 mg Intravenous Q12H     multivitamins w/minerals  15 mL Per Feeding Tube Daily     pantoprazole  40 mg Oral Daily     protein modular  2 packet Per Feeding Tube Q6H     sodium chloride (PF)  3 mL Intracatheter Q8H     sodium chloride (PF)  3 mL Intracatheter Q8H       Infusion Meds    dexmedetomidine Stopped (01/01/21 1600)     propofol (DIPRIVAN) infusion 40 mcg/kg/min (01/01/21 1600)     NaCl 75 mL/hr at 01/01/21 1158     sodium chloride 10 mL/hr at 01/01/21 0800       PRN Meds  acetaminophen, glucose **OR** dextrose **OR** glucagon, HYDROmorphone, lidocaine 4%, lidocaine (buffered or not buffered), melatonin, midazolam, naloxone **OR** naloxone **OR** naloxone **OR** naloxone, nitroGLYcerin, propofol (DIPRIVAN) infusion **AND** propofol **AND** CK total **AND** Triglycerides, sodium chloride (PF), sodium chloride (PF)       PHYSICAL EXAMINATION  Temp:  [96.6  F (35.9  C)-99.7  F (37.6  C)] 96.6  F (35.9  C)  Pulse:  [48-77] 48  Resp:  [0-22] 16  BP: ()/(52-79) 78/56  FiO2 (%):  [30 %] 30 %  SpO2:  [94 %-100 %] 94 %     General:  patient lying in bed without any acute distress    HEENT:  normocephalic/atraumatic, intubated  Cardio:  RRR  Pulmonary:  no respiratory distress, on mechanical ventilation  Abdomen:  soft, non-tender, non-distended  Extremities:  no edema, peripheral pulses palpable  Skin:  intact, warm/dry       Neurologic  Mental Status:  examined after propofol 75 mcg/kg/hr turned off for ~5 minutes, comatose  Cranial Nerves:  does not blink to threat, PERRL 3 mm, absent corneal and VOR, cough and gag intact, breaths over vent set rate  Motor:  normal muscle tone and bulk, does not move limbs, no abnormal movements  Reflexes:  asymmetric, hyperreflexive throughout, moreso on R than L, no clonus, toes up bilaterally  Sensory:  does not respond to noxious in BUEs, no withdrawal in BLEs  Coordination:  not tested  Station/Gait:  not tested      Imaging  I personally reviewed all imaging; relevant findings per HPI.     Lab Results Data   CBC  Recent Labs   Lab 01/01/21  0533 12/31/20 0442 12/30/20  0830   WBC 27.7* 25.7* 28.8*   RBC 4.23* 4.59 5.12   HGB 12.6* 13.8 15.7   HCT 38.4* 42.1 47.6    262 329     Basic Metabolic Panel    Recent Labs   Lab 01/01/21  0533 12/31/20  0442 12/30/20  0830   * 139 138   POTASSIUM 3.6 3.5 3.8   CHLORIDE 113* 110* 104   CO2 27 24 29   BUN 22 17 17   CR 0.93 1.04 0.89   * 240* 224*   HERNESTO 8.1* 8.1* 9.2     Liver Panel  Recent Labs   Lab 01/01/21  0533 12/31/20  0442 12/30/20  0830   PROTTOTAL 6.3* 6.8 8.3   ALBUMIN 2.0* 2.3* 3.3*   BILITOTAL 0.4 0.8 1.8*   ALKPHOS 48 51 78   AST 62* 44 38   ALT 60 57 70     INR  No lab results found.   Lipid Profile    Recent Labs   Lab Test 03/08/19  0845   CHOL 232*   HDL 41   *   TRIG 138     A1C    Recent Labs   Lab Test 12/30/20  2303   A1C 6.2*     Troponin I    Recent Labs   Lab 12/30/20  0830   TROPI <0.015

## 2021-01-01 NOTE — PLAN OF CARE
Vented on propofol, no vent changes. VSS ex soft BP at times. Afebrile. Withdraws to pain on bilateral lower extremities but no response to stimuli on upper extremities. TF not started due to OG output. EEG monitoring. K+ replaced. Wife updated via phone.

## 2021-01-01 NOTE — PLAN OF CARE
PT/OT: Orders received and chart reviewed. Per discussion with RN, pt is not appropriate to participate in therapy. Team to re-order therapy when medically appropriate and able to participate in therapy.

## 2021-01-01 NOTE — PROGRESS NOTES
Critical Care Progress Note      01/01/2021    Name: Stevie Dotson MRN#: [id number]   Age: 65 year old YOB: 1955     Hsptl Day# 2  ICU DAY #2    MV DAY #2             Problem List:   Active Problems:    Encephalitis    Meningitis  Loss of protective airway reflexes  encephalopathy         Summary/Hospital Course:     65M without marked pmh now presented with encephalopathy in setting of MRI results suggesting recent seizure and LP result suggesting meningitis.  Worsening encephalopathy on floor resulting in loss of airway protective reflexes requiring intubation for airway protection.  Unable to obtain further history at this time as pt is intubated/sedated.      Assessment and plan :     Stevie Dotson IS a 65 year old male admitted on 12/30/2020 for meningitis and encephalopathy requiring intubation for airway protection.   I have personally reviewed the daily labs, imaging studies, cultures and discussed the case with referring physician and consulting physicians.   My assessment and plan by system for this patient is as follows:    Neurology/Psychiatry:   1. Sedation:  Propofol, precedex, prn midazolam  2. Analgesia: prn dilaudid  3. Encephalopathy/encephalitis:  In setting of meningitis noted below.    4.  Seizures:  Admission MRI c/w active seizure vs post-ictal.  No further seizures on EEG.  Appreciate neuro input.  Continue keppra and fosphenytoin.     Cardiovascular:   1.  Lactic acidosis:  Resolved 2.9 --> 1.8.  2.  bp acceptable for now. Antihypertensives vs pressors prn.    Pulmonary/Ventilator Management:   1.  Loss of protective airway reflexes:  In setting of encephalopathy noted above.  Inappropriate for extubation today.    GI and Nutrition :   1. Having significant OG tube output. May be related to  hold on tube feeds for now.  2. PPI for pud prophy    Renal/Fluids/Electrolytes:   1. MELE: Creatinine improving to 0.93.  2.  Mild hyperNa:  To 145.  Changed maintenance to 1/2NS  at 75.    Infectious Disease:   1. Bacteremia:  Strep pneumoniae on culture; pansensitive.  On ctx and vanco;  Appreciate ID input.  2. Meningitis:  Strep pneumo in csf, pan sensitive.  Continue vanco/ctx.  Appreciate ID input.  Continue dex 10 q6hr for 4 days.      Endocrine:   1. Hyperglycemia:  Sliding scale insulin.  Added lantus for coverage while on steroids with good effect; can probably discontinue this once steroids complete.    Hematology/Oncology:   1. Wbc elevated 27 -- in setting of meningitis and steroids  2. hgb 12.6 ok  3. pltlts 273 ok    ICU Prophylaxis:   1. DVT: Hep Subq/mechanical  2. VAP: HOB 30 degrees, chlorhexidine rinse  3. Stress Ulcer: PPI/H2 blocker  4. Restraints: Nonviolent soft two point restraints required and necessary for patient safety and continued cares and good effect as patient continues to pull at necessary lines, tubes despite education and distraction. Will readdress daily.   5. Wound care - per unit routine   6. Feeding - tf  7. Family Update: wife by phone  8. Disposition - icu           Interim History:     No events overnight.  Purposefully reaches for tube when sedation weaned but doesn't follow commands.  Unable to obtain history directly due to encephalopathy.         Key Medications:       sodium chloride 0.9%  500 mL Intravenous Once     sodium chloride 0.9 %  100 mL Intravenous Once     cefTRIAXone  2 g Intravenous Q12H     chlorhexidine  15 mL Mouth/Throat Q12H     dexamethasone  10 mg Intravenous Q6H     fosphenytoin (CEREBYX) IV (loading dose)  2 mg PE/kg (Adjusted) Intravenous Q8H     heparin ANTICOAGULANT  5,000 Units Subcutaneous Q8H     insulin aspart  1-6 Units Subcutaneous Q4H     insulin glargine  10 Units Subcutaneous QAM AC     iopamidol  120 mL Intravenous Once     levETIRAcetam  1,000 mg Intravenous Q12H     multivitamins w/minerals  15 mL Per Feeding Tube Daily     pantoprazole  40 mg Oral Daily     protein modular  2 packet Per Feeding Tube Q6H      sodium chloride (PF)  3 mL Intracatheter Q8H     sodium chloride (PF)  3 mL Intracatheter Q8H     vancomycin (VANCOCIN) IV  2,000 mg Intravenous Q12H       dexmedetomidine 0.4 mcg/kg/hr (01/01/21 1050)     lactated ringers 100 mL/hr at 01/01/21 0800     propofol (DIPRIVAN) infusion 75 mcg/kg/min (01/01/21 1040)     sodium chloride 10 mL/hr at 01/01/21 0800               Physical Examination:   Temp:  [96.4  F (35.8  C)-99.7  F (37.6  C)] 99.3  F (37.4  C)  Pulse:  [60-77] 71  Resp:  [0-23] 14  BP: ()/(57-79) 98/65  FiO2 (%):  [30 %] 30 %  SpO2:  [94 %-100 %] 94 %  No intake or output data in the 24 hours ending 12/30/20 1600  Wt Readings from Last 4 Encounters:   01/01/21 114 kg (251 lb 5.2 oz)   12/30/20 113.4 kg (250 lb)   03/08/19 112.9 kg (249 lb)   11/14/18 115.4 kg (254 lb 8 oz)     BP - Mean:  [67-91] 75  Ventilation Mode: CMV/AC  (Continuous Mandatory Ventilation/ Assist Control)  FiO2 (%): 30 %  Rate Set (breaths/minute): 16 breaths/min  Tidal Volume Set (mL): 500 mL  PEEP (cm H2O): 5 cmH2O  Oxygen Concentration (%): 30 %  Resp: 14    Recent Labs   Lab 12/30/20  1640 12/30/20  0830   PH 7.41  --    PCO2 35  --    PO2 93  --    HCO3 22  --    O2PER  --  Room Air       GEN: sedated  HEENT: head ncat, sclera anicteric, OP patent, trachea midline   PULM: unlabored synchronous with vent, clear anteriorly    CV/COR: RRR S1S2 no gallop,  No rub, no murmur  ABD: soft nontender, hypoactive bowel sounds, no mass  EXT:  Minimal Edema; warm x4  NEURO: sedated which limits exam; perrl; purposeful localization to noxious stimuli when sedation weaned but not yet following.  SKIN: no obvious rash  LINES: clean, dry intact         Data:   All data and imaging reviewed     ROUTINE ICU LABS (Last four results)  CMP  Recent Labs   Lab 01/01/21  0533 12/31/20  0442 12/30/20  0830   * 139 138   POTASSIUM 3.6 3.5 3.8   CHLORIDE 113* 110* 104   CO2 27 24 29   ANIONGAP 5 5 5   * 240* 224*   BUN 22 17 17   CR 0.93  1.04 0.89   GFRESTIMATED 86 75 90   GFRESTBLACK >90 87 >90   HERNESTO 8.1* 8.1* 9.2   MAG 2.7* 2.6*  --    PHOS 2.8 1.8*  --    PROTTOTAL 6.3* 6.8 8.3   ALBUMIN 2.0* 2.3* 3.3*   BILITOTAL 0.4 0.8 1.8*   ALKPHOS 48 51 78   AST 62* 44 38   ALT 60 57 70     CBC  Recent Labs   Lab 01/01/21  0533 12/31/20  0442 12/30/20  0830   WBC 27.7* 25.7* 28.8*   RBC 4.23* 4.59 5.12   HGB 12.6* 13.8 15.7   HCT 38.4* 42.1 47.6   MCV 91 92 93   MCH 29.8 30.1 30.7   MCHC 32.8 32.8 33.0   RDW 14.6 14.5 14.0    262 329     INRNo lab results found in last 7 days.  Arterial Blood Gas  Recent Labs   Lab 12/30/20  1640 12/30/20  0830   PH 7.41  --    PCO2 35  --    PO2 93  --    HCO3 22  --    O2PER  --  Room Air       All cultures:  Recent Labs   Lab 12/30/20  2302 12/30/20  1033 12/30/20  0953 12/30/20  0937 12/30/20  0830   CULT No growth after 1 day No growth after 2 days Moderate growth  Streptococcus pneumoniae  *  Critical Value/Significant Value, preliminary result only, called to and read back by  Janis Zafar RN at 0465 [email or web address].DK   No growth Cultured on the 1st day of incubation:  Streptococcus pneumoniae  *  Critical Value/Significant Value, preliminary result only, called to and read back by  Abdullahi Brooks RN. @2819. 12.30.20. BS.     (Note)  POSITIVE for STREPTOCOCCUS PNEUMONIAE by [employer] [facility] multiplex nucleic  acid test. Note: Streptococcus mitis group is known to cross react  with S. pneumoniae. Correlation with culture results and clinical  presentation is necessary. Final identification and antimicrobial  susceptibility testing will be verified by standard methods.    Specimen tested with Verigene multiplex, gram-positive blood culture  nucleic acid test for the following targets: Staph aureus, Staph  epidermidis, Staph lugdunensis, other Staph species, Enterococcus  faecalis, Enterococcus faecium, Streptococcus species, S. agalactiae,  S. anginosus grp., S. pneumoniae, S. pyogenes, Listeria sp., mecA  (methicillin  resistance) and Robert/B (vancomycin resistance).    Critical Value/Significant Value called to and read back by Abdullahi Brooks at 0205 on 20 by .       Recent Results (from the past 24 hour(s))   Echocardiogram Complete    Narrative    [id number]  [id number]  [id number]  [id number]^MIKEY^MARKO^A           [facility]  Echocardiography Laboratory  [address] [place] [address]        Name: CLARENCE DEL REAL  MRN: [id number]  : 1955  Study Date: 2020 01:33 PM  Age: 65 yrs  Gender: Male  Patient Location: [facility]  Reason For Study: Endocarditis  Ordering Physician: MARKO JENSEN  Performed By: Joaquin Dunlap     BSA: 2.4 m2  Height: 73 in  Weight: 251 lb  HR: 60  BP: 95/66 mmHg  _____________________________________________________________________________  __        Procedure  Complete Portable Echo Adult.  _____________________________________________________________________________  __        Interpretation Summary     1. The left ventricle is normal in structure, function and size. The visual  ejection fraction is estimated at 60%.  2. The right ventricle is moderately dilated. The right ventricular systolic  function is normal.  3. No valve disease.  4. The ascending aorta is Mildly dilated. 4.0cm.     No previous echo for comparison.  _____________________________________________________________________________  __        Left Ventricle  The left ventricle is normal in structure, function and size. There is normal  left ventricular wall thickness. The visual ejection fraction is estimated at  60%. Left ventricular diastolic function is indeterminate. Normal left  ventricular wall motion.     Right Ventricle  The right ventricle is moderately dilated. The right ventricular systolic  function is normal.     Atria  The left atrium is mildly dilated. The right atrium is severely dilated. There  is no atrial shunt seen.     Mitral Valve  The mitral valve is normal in structure  and function.        Tricuspid Valve  There is mild (1+) tricuspid regurgitation. The right ventricular systolic  pressure is approximated at 25.0 mmHg plus the right atrial pressure.     Aortic Valve  The aortic valve is normal in structure and function.     Pulmonic Valve  The pulmonic valve is normal in structure and function.     Vessels  The ascending aorta is Mildly dilated. Dilation of the inferior vena cava is  present with abnormal respiratory variation in diameter.     Pericardium  There is no pericardial effusion.        Rhythm  Sinus rhythm was noted.  _____________________________________________________________________________  __  MMode/2D Measurements & Calculations     IVSd: 0.97 cm  LVIDd: 6.3 cm  LVIDs: 5.2 cm  LVPWd: 0.77 cm  FS: 17.6 %  LV mass(C)d: 226.0 grams  LV mass(C)dI: 95.3 grams/m2  Ao root diam: 4.0 cm  LA dimension: 4.3 cm  asc Aorta Diam: 3.5 cm  LA/Ao: 1.1  LVOT diam: 2.2 cm  LVOT area: 3.7 cm2  LA Volume (BP): 81.6 ml  LA Volume Index (BP): 34.4 ml/m2  RWT: 0.24           Doppler Measurements & Calculations  MV E max yayo: 80.5 cm/sec  MV A max yayo: 47.5 cm/sec  MV E/A: 1.7  MV dec slope: 439.8 cm/sec2  MV dec time: 0.18 sec  PA acc time: 0.11 sec  TR max yayo: 249.8 cm/sec  TR max P.0 mmHg  Pulm Sys Yayo: 65.8 cm/sec  Pulm Willis Yayo: 47.4 cm/sec  Pulm S/D: 1.4  E/E' av.6  Lateral E/e': 9.7  Medial E/e': 13.5              _____________________________________________________________________________  __        Report approved by: Albania Rogers 2020 03:31 PM        Labs (personally reviewed/interpreted):  See a/p    Billing: This patient is critically ill: Yes. Total critical care time today 40 min.

## 2021-01-01 NOTE — PROGRESS NOTES
Critical access hospital ICU RESPIRATORY NOTE           Date of Admission: 12/30/2020     Date of Intubation (most recent): 12/30/2020     Reason for Mechanical Ventilation: Airway protection     Number of Days on Mechanical Ventilation: 3     Met Criteria for Spontaneous Breathing Trial: No     Reason for No Spontaneous Breathing Trial: No per MD     Significant Events Tonight: None this shift.     Ventilation Mode: CMV/AC  (Continuous Mandatory Ventilation/ Assist Control)  FiO2 (%): 30 %  Rate Set (breaths/minute): 16 breaths/min  Tidal Volume Set (mL): 500 mL  PEEP (cm H2O): 5 cmH2O  Oxygen Concentration (%): 30 %  Resp: 17    Recent Labs   Lab 12/30/20  1640 12/30/20  0830   PH 7.41  --    PCO2 35  --    PO2 93  --    HCO3 22  --    O2PER  --  Room Air   Plan: Continue full vent support tonight

## 2021-01-02 ENCOUNTER — APPOINTMENT (OUTPATIENT)
Dept: GENERAL RADIOLOGY | Facility: CLINIC | Age: 66
DRG: 871 | End: 2021-01-02
Attending: INTERNAL MEDICINE
Payer: COMMERCIAL

## 2021-01-02 ENCOUNTER — HOSPITAL ENCOUNTER (OUTPATIENT)
Dept: NEUROLOGY | Facility: CLINIC | Age: 66
DRG: 871 | End: 2021-01-02
Attending: NURSE PRACTITIONER
Payer: COMMERCIAL

## 2021-01-02 LAB
1,3 BETA GLUCAN SER-MCNC: <31 PG/ML
ALBUMIN SERPL-MCNC: 1.9 G/DL (ref 3.4–5)
ALP SERPL-CCNC: 53 U/L (ref 40–150)
ALT SERPL W P-5'-P-CCNC: 64 U/L (ref 0–70)
ANION GAP SERPL CALCULATED.3IONS-SCNC: 3 MMOL/L (ref 3–14)
AST SERPL W P-5'-P-CCNC: 48 U/L (ref 0–45)
B-D GLUCAN INTERPRETATION (1,3): NEGATIVE
BILIRUB SERPL-MCNC: 0.5 MG/DL (ref 0.2–1.3)
BUN SERPL-MCNC: 29 MG/DL (ref 7–30)
CALCIUM SERPL-MCNC: 7.9 MG/DL (ref 8.5–10.1)
CHLORIDE SERPL-SCNC: 112 MMOL/L (ref 94–109)
CO2 SERPL-SCNC: 30 MMOL/L (ref 20–32)
CREAT SERPL-MCNC: 1.05 MG/DL (ref 0.66–1.25)
ERYTHROCYTE [DISTWIDTH] IN BLOOD BY AUTOMATED COUNT: 14.6 % (ref 10–15)
GFR SERPL CREATININE-BSD FRML MDRD: 74 ML/MIN/{1.73_M2}
GLUCOSE BLDC GLUCOMTR-MCNC: 129 MG/DL (ref 70–99)
GLUCOSE BLDC GLUCOMTR-MCNC: 149 MG/DL (ref 70–99)
GLUCOSE BLDC GLUCOMTR-MCNC: 152 MG/DL (ref 70–99)
GLUCOSE BLDC GLUCOMTR-MCNC: 153 MG/DL (ref 70–99)
GLUCOSE BLDC GLUCOMTR-MCNC: 162 MG/DL (ref 70–99)
GLUCOSE BLDC GLUCOMTR-MCNC: 164 MG/DL (ref 70–99)
GLUCOSE BLDC GLUCOMTR-MCNC: 178 MG/DL (ref 70–99)
GLUCOSE SERPL-MCNC: 145 MG/DL (ref 70–99)
HCT VFR BLD AUTO: 39.4 % (ref 40–53)
HGB BLD-MCNC: 12.9 G/DL (ref 13.3–17.7)
LEVETIRACETAM SERPL-MCNC: 12 UG/ML (ref 12–46)
MAGNESIUM SERPL-MCNC: 2.7 MG/DL (ref 1.6–2.3)
MCH RBC QN AUTO: 29.4 PG (ref 26.5–33)
MCHC RBC AUTO-ENTMCNC: 32.7 G/DL (ref 31.5–36.5)
MCV RBC AUTO: 90 FL (ref 78–100)
PHOSPHATE SERPL-MCNC: 3.1 MG/DL (ref 2.5–4.5)
PLATELET # BLD AUTO: 264 10E9/L (ref 150–450)
POTASSIUM SERPL-SCNC: 4 MMOL/L (ref 3.4–5.3)
PROT SERPL-MCNC: 6.3 G/DL (ref 6.8–8.8)
RBC # BLD AUTO: 4.39 10E12/L (ref 4.4–5.9)
SODIUM SERPL-SCNC: 145 MMOL/L (ref 133–144)
WBC # BLD AUTO: 19.2 10E9/L (ref 4–11)

## 2021-01-02 PROCEDURE — 83735 ASSAY OF MAGNESIUM: CPT | Performed by: INTERNAL MEDICINE

## 2021-01-02 PROCEDURE — 94003 VENT MGMT INPAT SUBQ DAY: CPT

## 2021-01-02 PROCEDURE — 250N000011 HC RX IP 250 OP 636: Performed by: NURSE PRACTITIONER

## 2021-01-02 PROCEDURE — 250N000013 HC RX MED GY IP 250 OP 250 PS 637: Performed by: NURSE PRACTITIONER

## 2021-01-02 PROCEDURE — 80053 COMPREHEN METABOLIC PANEL: CPT | Performed by: INTERNAL MEDICINE

## 2021-01-02 PROCEDURE — 999N000157 HC STATISTIC RCP TIME EA 10 MIN

## 2021-01-02 PROCEDURE — 200N000001 HC R&B ICU

## 2021-01-02 PROCEDURE — 99291 CRITICAL CARE FIRST HOUR: CPT | Performed by: INTERNAL MEDICINE

## 2021-01-02 PROCEDURE — 250N000011 HC RX IP 250 OP 636: Performed by: INTERNAL MEDICINE

## 2021-01-02 PROCEDURE — 999N000009 HC STATISTIC AIRWAY CARE

## 2021-01-02 PROCEDURE — 999N000052 EEG VIDEO 12-26 HR UNMONITORED

## 2021-01-02 PROCEDURE — 258N000003 HC RX IP 258 OP 636: Performed by: INTERNAL MEDICINE

## 2021-01-02 PROCEDURE — 999N001017 HC STATISTIC GLUCOSE BY METER IP

## 2021-01-02 PROCEDURE — 85027 COMPLETE CBC AUTOMATED: CPT | Performed by: INTERNAL MEDICINE

## 2021-01-02 PROCEDURE — 84100 ASSAY OF PHOSPHORUS: CPT | Performed by: INTERNAL MEDICINE

## 2021-01-02 PROCEDURE — 36415 COLL VENOUS BLD VENIPUNCTURE: CPT | Performed by: INTERNAL MEDICINE

## 2021-01-02 PROCEDURE — 250N000012 HC RX MED GY IP 250 OP 636 PS 637: Performed by: INTERNAL MEDICINE

## 2021-01-02 PROCEDURE — 258N000002 HC RX IP 258 OP 250: Performed by: INTERNAL MEDICINE

## 2021-01-02 PROCEDURE — 258N000003 HC RX IP 258 OP 636: Performed by: STUDENT IN AN ORGANIZED HEALTH CARE EDUCATION/TRAINING PROGRAM

## 2021-01-02 PROCEDURE — 250N000013 HC RX MED GY IP 250 OP 250 PS 637: Performed by: INTERNAL MEDICINE

## 2021-01-02 PROCEDURE — 95720 EEG PHY/QHP EA INCR W/VEEG: CPT | Performed by: PSYCHIATRY & NEUROLOGY

## 2021-01-02 PROCEDURE — 250N000011 HC RX IP 250 OP 636: Performed by: STUDENT IN AN ORGANIZED HEALTH CARE EDUCATION/TRAINING PROGRAM

## 2021-01-02 PROCEDURE — 999N000065 XR ABDOMEN PORT 1 VW

## 2021-01-02 PROCEDURE — 99232 SBSQ HOSP IP/OBS MODERATE 35: CPT | Mod: 25 | Performed by: PSYCHIATRY & NEUROLOGY

## 2021-01-02 RX ADMIN — INSULIN ASPART 1 UNITS: 100 INJECTION, SOLUTION INTRAVENOUS; SUBCUTANEOUS at 11:58

## 2021-01-02 RX ADMIN — ACETAMINOPHEN 650 MG: 650 SUPPOSITORY RECTAL at 07:48

## 2021-01-02 RX ADMIN — SODIUM CHLORIDE 175 MG PE: 9 INJECTION, SOLUTION INTRAVENOUS at 13:44

## 2021-01-02 RX ADMIN — CHLORHEXIDINE GLUCONATE 0.12% ORAL RINSE 15 ML: 1.2 LIQUID ORAL at 20:04

## 2021-01-02 RX ADMIN — LEVETIRACETAM 1000 MG: 10 INJECTION INTRAVENOUS at 07:48

## 2021-01-02 RX ADMIN — MULTIVITAMIN 15 ML: LIQUID ORAL at 07:50

## 2021-01-02 RX ADMIN — PROPOFOL 30 MCG/KG/MIN: 10 INJECTION, EMULSION INTRAVENOUS at 07:47

## 2021-01-02 RX ADMIN — INSULIN GLARGINE 10 UNITS: 100 INJECTION, SOLUTION SUBCUTANEOUS at 07:48

## 2021-01-02 RX ADMIN — LEVETIRACETAM 1000 MG: 10 INJECTION INTRAVENOUS at 20:04

## 2021-01-02 RX ADMIN — DEXAMETHASONE SODIUM PHOSPHATE 10 MG: 4 INJECTION, SOLUTION INTRAMUSCULAR; INTRAVENOUS at 22:22

## 2021-01-02 RX ADMIN — HEPARIN SODIUM 5000 UNITS: 5000 INJECTION, SOLUTION INTRAVENOUS; SUBCUTANEOUS at 20:04

## 2021-01-02 RX ADMIN — SODIUM CHLORIDE 175 MG PE: 9 INJECTION, SOLUTION INTRAVENOUS at 05:49

## 2021-01-02 RX ADMIN — SODIUM CHLORIDE: 9 INJECTION, SOLUTION INTRAVENOUS at 05:39

## 2021-01-02 RX ADMIN — DEXAMETHASONE SODIUM PHOSPHATE 10 MG: 4 INJECTION, SOLUTION INTRAMUSCULAR; INTRAVENOUS at 09:08

## 2021-01-02 RX ADMIN — DEXAMETHASONE SODIUM PHOSPHATE 10 MG: 4 INJECTION, SOLUTION INTRAMUSCULAR; INTRAVENOUS at 04:02

## 2021-01-02 RX ADMIN — CEFTRIAXONE SODIUM 2 G: 2 INJECTION, POWDER, FOR SOLUTION INTRAMUSCULAR; INTRAVENOUS at 09:08

## 2021-01-02 RX ADMIN — HEPARIN SODIUM 5000 UNITS: 5000 INJECTION, SOLUTION INTRAVENOUS; SUBCUTANEOUS at 04:02

## 2021-01-02 RX ADMIN — PANTOPRAZOLE SODIUM 40 MG: 40 TABLET, DELAYED RELEASE ORAL at 07:48

## 2021-01-02 RX ADMIN — ACETAMINOPHEN 650 MG: 650 SUPPOSITORY RECTAL at 16:30

## 2021-01-02 RX ADMIN — INSULIN ASPART 1 UNITS: 100 INJECTION, SOLUTION INTRAVENOUS; SUBCUTANEOUS at 00:49

## 2021-01-02 RX ADMIN — PROPOFOL 30 MCG/KG/MIN: 10 INJECTION, EMULSION INTRAVENOUS at 05:49

## 2021-01-02 RX ADMIN — PROPOFOL 30 MCG/KG/MIN: 10 INJECTION, EMULSION INTRAVENOUS at 01:40

## 2021-01-02 RX ADMIN — CEFTRIAXONE SODIUM 2 G: 2 INJECTION, POWDER, FOR SOLUTION INTRAMUSCULAR; INTRAVENOUS at 22:22

## 2021-01-02 RX ADMIN — Medication 2 PACKET: at 16:28

## 2021-01-02 RX ADMIN — MIDAZOLAM HYDROCHLORIDE 2 MG: 1 INJECTION, SOLUTION INTRAMUSCULAR; INTRAVENOUS at 20:04

## 2021-01-02 RX ADMIN — MIDAZOLAM HYDROCHLORIDE 2 MG: 1 INJECTION, SOLUTION INTRAMUSCULAR; INTRAVENOUS at 07:48

## 2021-01-02 RX ADMIN — Medication 2 PACKET: at 23:51

## 2021-01-02 RX ADMIN — HYDROMORPHONE HYDROCHLORIDE 0.5 MG: 1 INJECTION, SOLUTION INTRAMUSCULAR; INTRAVENOUS; SUBCUTANEOUS at 07:48

## 2021-01-02 RX ADMIN — HYDROMORPHONE HYDROCHLORIDE 0.5 MG: 1 INJECTION, SOLUTION INTRAMUSCULAR; INTRAVENOUS; SUBCUTANEOUS at 20:04

## 2021-01-02 RX ADMIN — Medication 2 PACKET: at 09:08

## 2021-01-02 RX ADMIN — HYDROMORPHONE HYDROCHLORIDE 0.5 MG: 1 INJECTION, SOLUTION INTRAMUSCULAR; INTRAVENOUS; SUBCUTANEOUS at 16:25

## 2021-01-02 RX ADMIN — MIDAZOLAM HYDROCHLORIDE 2 MG: 1 INJECTION, SOLUTION INTRAMUSCULAR; INTRAVENOUS at 13:58

## 2021-01-02 RX ADMIN — INSULIN ASPART 1 UNITS: 100 INJECTION, SOLUTION INTRAVENOUS; SUBCUTANEOUS at 23:49

## 2021-01-02 RX ADMIN — SODIUM CHLORIDE: 4.5 INJECTION, SOLUTION INTRAVENOUS at 01:37

## 2021-01-02 RX ADMIN — INSULIN ASPART 1 UNITS: 100 INJECTION, SOLUTION INTRAVENOUS; SUBCUTANEOUS at 07:50

## 2021-01-02 RX ADMIN — HEPARIN SODIUM 5000 UNITS: 5000 INJECTION, SOLUTION INTRAVENOUS; SUBCUTANEOUS at 12:35

## 2021-01-02 RX ADMIN — INSULIN ASPART 1 UNITS: 100 INJECTION, SOLUTION INTRAVENOUS; SUBCUTANEOUS at 20:09

## 2021-01-02 RX ADMIN — HYDROMORPHONE HYDROCHLORIDE 0.5 MG: 1 INJECTION, SOLUTION INTRAMUSCULAR; INTRAVENOUS; SUBCUTANEOUS at 13:58

## 2021-01-02 RX ADMIN — CHLORHEXIDINE GLUCONATE 0.12% ORAL RINSE 15 ML: 1.2 LIQUID ORAL at 07:47

## 2021-01-02 RX ADMIN — Medication 2 PACKET: at 05:23

## 2021-01-02 RX ADMIN — DEXAMETHASONE SODIUM PHOSPHATE 10 MG: 4 INJECTION, SOLUTION INTRAMUSCULAR; INTRAVENOUS at 16:25

## 2021-01-02 RX ADMIN — PROPOFOL 20 MG: 10 INJECTION, EMULSION INTRAVENOUS at 06:40

## 2021-01-02 RX ADMIN — INSULIN ASPART 1 UNITS: 100 INJECTION, SOLUTION INTRAVENOUS; SUBCUTANEOUS at 16:28

## 2021-01-02 ASSESSMENT — ACTIVITIES OF DAILY LIVING (ADL)
ADLS_ACUITY_SCORE: 26

## 2021-01-02 ASSESSMENT — MIFFLIN-ST. JEOR: SCORE: 1996.88

## 2021-01-02 NOTE — PROGRESS NOTES
[facility]    Infectious Disease Progress Note    Date of Service (when I saw the patient): 01/02/2021     Assessment & Plan   Stevie Dotson is a 65 year old male who was admitted on 12/30/2020.     Impression:  1. 65 y.o male with admitted with confusion and fever.   2. Progressive encephalopathy in setting of MRI results suggesting recent seizure   3. LP done suggestive of meningitis.    4. Started on broad spectrum antimicrobials and steroids   5. Intubated due to progressive encephalopathy.   6. CSF cultures now coming back with GPC In pairs and chains which along with positive blood cultures for step pneumo explains the presentation.        Recommendations:   Continue on Ceftriaxone BID dosing alone can stop vancomycin.   Steroids at the least for 4 days for meningitis, more per ICU     Ria aPlma MD    Interval History   Cultures as listed   Stays intubated   Fever jessica r      Physical Exam   Temp: 99.5  F (37.5  C) Temp src: Bladder BP: 117/79 Pulse: 58   Resp: 18 SpO2: 95 % O2 Device: Mechanical Ventilator    Vitals:    12/31/20 0600 01/01/21 0400 01/02/21 0600   Weight: 114 kg (251 lb 5.2 oz) 114 kg (251 lb 5.2 oz) 115.8 kg (255 lb 4.7 oz)     Vital Signs with Ranges  Temp:  [96.1  F (35.6  C)-100.4  F (38  C)] 99.5  F (37.5  C)  Pulse:  [46-74] 58  Resp:  [11-24] 18  BP: ()/(54-82) 117/79  FiO2 (%):  [30 %] 30 %  SpO2:  [91 %-99 %] 95 %    Constitutional: intubated and sedated   Lungs: Clear to auscultation bilaterally, no crackles or wheezing  Cardiovascular: Regular rate and rhythm, normal S1 and S2, and no murmur noted  Abdomen: Normal bowel sounds, soft, non-distended, non-tender  Skin: No rashes, no cyanosis, no edema  Other:    Medications     propofol (DIPRIVAN) infusion Stopped (01/02/21 0800)     sodium chloride Stopped (01/02/21 0900)       sodium chloride 0.9%  500 mL Intravenous Once     sodium chloride 0.9 %  100 mL Intravenous Once     cefTRIAXone  2 g  Intravenous Q12H     chlorhexidine  15 mL Mouth/Throat Q12H     dexamethasone  10 mg Intravenous Q6H     fosphenytoin (CEREBYX) IV (loading dose)  2 mg PE/kg (Adjusted) Intravenous Q8H     heparin ANTICOAGULANT  5,000 Units Subcutaneous Q8H     insulin aspart  1-6 Units Subcutaneous Q4H     insulin glargine  10 Units Subcutaneous QAM AC     iopamidol  120 mL Intravenous Once     levETIRAcetam  1,000 mg Intravenous Q12H     multivitamins w/minerals  15 mL Per Feeding Tube Daily     pantoprazole  40 mg Oral Daily     protein modular  2 packet Per Feeding Tube Q6H     sodium chloride (PF)  3 mL Intracatheter Q8H       Data   All microbiology laboratory data reviewed.  Recent Labs   Lab Test 01/02/21  0532 01/01/21  0533 12/31/20  0442   WBC 19.2* 27.7* 25.7*   HGB 12.9* 12.6* 13.8   HCT 39.4* 38.4* 42.1   MCV 90 91 92    273 262     Recent Labs   Lab Test 01/02/21  0532 01/01/21  0533 12/31/20  0442   CR 1.05 0.93 1.04     No lab results found.  Recent Labs   Lab Test 12/30/20  2302 12/30/20  1033 12/30/20  0953 12/30/20  0937 12/30/20  0830   CULT No growth after 2 days No growth after 3 days Moderate growth  Streptococcus pneumoniae  *  Critical Value/Significant Value, preliminary result only, called to and read back by  Janis Zafar RN at 7217 [email or web address]   No growth Cultured on the 1st day of incubation:  Streptococcus pneumoniae  *  Critical Value/Significant Value, preliminary result only, called to and read back by  Abdullahi Brooks RN. @2329. 12.30.20. BS.     (Note)  POSITIVE for STREPTOCOCCUS PNEUMONIAE by [employer] multiplex nucleic  acid test. Note: Streptococcus mitis group is known to cross react  with S. pneumoniae. Correlation with culture results and clinical  presentation is necessary. Final identification and antimicrobial  susceptibility testing will be verified by standard methods.    Specimen tested with AcceloWebigene multiplex, gram-positive blood culture  nucleic acid test for the following  targets: Staph aureus, Staph  epidermidis, Staph lugdunensis, other Staph species, Enterococcus  faecalis, Enterococcus faecium, Streptococcus species, S. agalactiae,  S. anginosus grp., S. pneumoniae, S. pyogenes, Listeria sp., mecA  (methicillin resistance) and Robert/B (vancomycin resistance).    Critical Value/Significant Value called to and read back by Abdullahi Brooks at 0205 on 12.31.20 by .         Attestation:  Total time on the floor involved in the patient's care: 35 minutes. Total time spent in counseling/care coordination: >50%

## 2021-01-02 NOTE — PROGRESS NOTES
[facility] ICU RESPIRATORY NOTE      Date of Admission: 12/30/2020     Date of Intubation (most recent): 12/30/2020     Reason for Mechanical Ventilation: Airway protection     Number of Days on Mechanical Ventilation: 3     Met Criteria for Spontaneous Breathing Trial: No     Reason for No Spontaneous Breathing Trial: No per MD     Significant Events Tonight: None this shift.   ABG Results:   Recent Labs   Lab 12/30/20  1640 12/30/20  0830   PH 7.41  --    PCO2 35  --    PO2 93  --    HCO3 22  --    O2PER  --  Room Air       Current Vent Settings: Ventilation Mode: CMV/AC  (Continuous Mandatory Ventilation/ Assist Control)  FiO2 (%): 30 %  Rate Set (breaths/minute): 16 breaths/min  Tidal Volume Set (mL): 500 mL  PEEP (cm H2O): 5 cmH2O  Oxygen Concentration (%): 30 %  Resp: 14          Plan: Continue full ventilator support.    Carlos Manuel Miller, RT  1/1/2021

## 2021-01-02 NOTE — PROGRESS NOTES
[facility] ICU RESPIRATORY NOTE        Date of Admission: 12/30/2020     Date of Intubation (most recent): 12/30/2020     Reason for Mechanical Ventilation: Airway protection     Number of Days on Mechanical Ventilation: 4     Met Criteria for Spontaneous Breathing Trial: No     Reason for No Spontaneous Breathing Trial: No per MD     Significant Events Tonight: None this shift.     Ventilation Mode: CMV/AC  (Continuous Mandatory Ventilation/ Assist Control)  FiO2 (%): 30 %  Rate Set (breaths/minute): 16 breaths/min  Tidal Volume Set (mL): 500 mL  PEEP (cm H2O): 5 cmH2O  Oxygen Concentration (%): 30 %  Resp: 22    Recent Labs   Lab 12/30/20  1640 12/30/20  0830   PH 7.41  --    PCO2 35  --    PO2 93  --    HCO3 22  --    O2PER  --  Room Air   Plan: Continue full vent support tonight

## 2021-01-02 NOTE — PROGRESS NOTES
Preliminary Video EEG impression on January 1-2, 2021  Until 9am     This video EEG is abnormal due to the presences of continuous generalized polymorphic delta/theta slowing with superimposed alpha activity.  There is a persistent asymmetry with right hemisphere being more attenuated compared to the left hemisphere.  Additionally the left hemisphere has variable frequencies up to 13 Hz throughout the file.  There is rhythmic delta activity 1 to 2 Hz in frequency up to 60  V when patient is stimulated.  A good example of this is at 20: 01.  This rhythm does not evolve to suggest seizure activity.  There is no clear parieto-occipital rhythm or well formed sleep architecture. EEG is  reactive with stimulation.    Propofol was stopped January 2 morning and EEG did have more mixed frequencies, however there was maximum slowing in the right hemisphere.  At 10: 08 patient is not being stimulated, however there is rhythmic delta activity 1 to 2 Hz with superimposed sharply contoured delta activity occurring at 1 to 2 Hz in frequency for several minutes.  This rhythm waxes and wanes.     This EEG is consistent with a severe diffuse encephalopathy with maximum cortical dysfunction in the right hemisphere.  After propofol is stopped EEG has increased mixed variable frequencies.  There is rhythmic sharply contoured delta activity occurring at 1 to 2 Hz in some segments.  This is thought to be part of the encephalopathic process and not seizures.  We will continue to monitor.  No clear epileptiform discharges or electrographic seizures were seen in this record. If events of interest are noted, please press the event button. Clinical correlation is advised.     Najma Del Valle MD  Staff Epilepsy Neurologist   [phone]       Declined

## 2021-01-02 NOTE — PROGRESS NOTES
[facility] ICU RESPIRATORY NOTE        Date of Admission: 12/30/2020    Date of Intubation (most recent): 12/30/20    Reason for Mechanical Ventilation: Airway Protection    Number of Days on Mechanical Ventilation: 4    Met Criteria for Spontaneous Breathing Trial: Yes - 5/5 30%    Significant Events Today: Patient on SBT since 0730 this am    ABG Results:   Recent Labs   Lab 12/30/20  1640 12/30/20  0830   PH 7.41  --    PCO2 35  --    PO2 93  --    HCO3 22  --    O2PER  --  Room Air       Current Vent Settings: Ventilation Mode: CPAP/PS  (Continuous positive airway pressure with Pressure Support)  FiO2 (%): 30 %  Rate Set (breaths/minute): 16 breaths/min  Tidal Volume Set (mL): 500 mL  PEEP (cm H2O): 5 cmH2O  Pressure Support (cm H2O): 5 cmH2O  Oxygen Concentration (%): 30 %  Resp: 19      Plan: Continue vent support and SBT as tolerated    Regina Ashley, RT

## 2021-01-02 NOTE — PLAN OF CARE
Sedated/intubated. No vent changes. On propofol @ 30. Biting at tube when stimulated, new etab placed by RT. Neuros unchanged. Good UOP. Flushed/red chest/face.

## 2021-01-02 NOTE — PROGRESS NOTES
"    [facility]    Stroke/Neurocritical Care Progress Note    Interval Events  Afebrile, Tmax 100.2, persistent leukocytosis but now downtrending, CSF and blood cultures finalized with pan-susceptible strep pneumo. Hypotensive and [place] overnight with combination precedex/propofol, now on much less propofol. AED levels still pending. EEG encephalopathic and asymmetric but no seizures noted as of yet. After propofol weaned this am much more awake, EEG still quite asymmetric despite no seizures recorded.    Impression  1. Strep pneumoniae meningitis and bacteremia, source as of yet unidentified, however most likely pulmonary per ID. Consider spine imaging if source control not achieved.  2. Right hemispheric focal slowing lasting longer than anticipated for just post-ictal effect  3. Obtundation, secondary to above and related to sedative effect    Plan  Continue ceftri per ID  Continue keppra 1 g IV q12  Continue fosphenytoin 175 mg IV q8  Trough levels pending  Continue vEEG  Repeat MRI brain w/ and w/o today to assess for R hemispheric enhancement not detected due to hyperacute MRI vs infarct to explain EEG asymmetry  Consider weaning AEDs pending MRI result, would discontinue fospheny first as can be sedating  Continue Q2 hour neurochecks    The Stroke/Neurocritical Care Staff is Dr. Wong.    Danyelle Guardado MD  Vascular Neurology Fellow  To page me or covering stroke neurology team member, click here: [facility]COM   Choose \"On Call\" tab at top, then search dropdown box for \"Neurology Adult\", select location, press Enter, then look for stroke/neuro ICU/telestroke.    ______________________________________________________    Medications   Home Meds  Prior to Admission medications    Medication Sig Start Date End Date Taking? Authorizing Provider   fluticasone (FLONASE) 50 MCG/ACT nasal spray Spray 1 spray into both nostrils 2 times daily   Yes Unknown, Entered By History   omeprazole (PRILOSEC) 20 " MG DR capsule Take 20 mg by mouth daily   Yes Unknown, Entered By History   oxymetazoline (AFRIN) 0.05 % nasal spray Spray 2 sprays into both nostrils 2 times daily   Yes Unknown, Entered By History   sildenafil (VIAGRA) 100 MG tablet Take 1 tablet (100 mg) by mouth daily as needed (ED) 3/8/19  Yes Agusto Holguin MD       Scheduled Meds    sodium chloride 0.9%  500 mL Intravenous Once     sodium chloride 0.9 %  100 mL Intravenous Once     cefTRIAXone  2 g Intravenous Q12H     chlorhexidine  15 mL Mouth/Throat Q12H     dexamethasone  10 mg Intravenous Q6H     fosphenytoin (CEREBYX) IV (loading dose)  2 mg PE/kg (Adjusted) Intravenous Q8H     heparin ANTICOAGULANT  5,000 Units Subcutaneous Q8H     insulin aspart  1-6 Units Subcutaneous Q4H     insulin glargine  10 Units Subcutaneous QAM AC     iopamidol  120 mL Intravenous Once     levETIRAcetam  1,000 mg Intravenous Q12H     multivitamins w/minerals  15 mL Per Feeding Tube Daily     pantoprazole  40 mg Oral Daily     protein modular  2 packet Per Feeding Tube Q6H     sodium chloride (PF)  3 mL Intracatheter Q8H     sodium chloride (PF)  3 mL Intracatheter Q8H       Infusion Meds    dexmedetomidine Stopped (01/01/21 1600)     propofol (DIPRIVAN) infusion Stopped (01/02/21 0800)     sodium chloride 20 mL/hr at 01/02/21 0640       PRN Meds  acetaminophen, glucose **OR** dextrose **OR** glucagon, HYDROmorphone, lidocaine 4%, lidocaine (buffered or not buffered), melatonin, midazolam, naloxone **OR** naloxone **OR** naloxone **OR** naloxone, nitroGLYcerin, propofol (DIPRIVAN) infusion **AND** propofol **AND** CK total **AND** Triglycerides, sodium chloride (PF), sodium chloride (PF)       PHYSICAL EXAMINATION  Temp:  [96.1  F (35.6  C)-100.4  F (38  C)] 99.9  F (37.7  C)  Pulse:  [46-74] 60  Resp:  [11-24] 23  BP: ()/(52-80) 114/71  FiO2 (%):  [30 %] 30 %  SpO2:  [91 %-100 %] 92 %     General:  patient lying in bed without any acute distress    HEENT:   normocephalic/atraumatic, intubated  Cardio:  RRR  Pulmonary:  no respiratory distress, on mechanical ventilation  Abdomen:  soft, non-tender, non-distended  Extremities:  no edema, peripheral pulses palpable  Skin:  intact, warm/dry, face and chest flush/red     Neurologic  Mental Status:  examined after propofol 30 mcg/kg/hr turned off for 1 hour, eyes open to voice, maintains open, attends to sound briefly, does not track or follow commands  Cranial Nerves:  blinks to threat bilaterally, PERRL 3 mm, intact corneals and VOR, cough and gag intact, breathes over vent set rate  Motor:  normal muscle tone and bulk, does not move limbs, no abnormal movements  Reflexes:  asymmetric, hyperreflexive throughout, moreso on R than L, no clonus, toes up bilaterally  Sensory:  does not respond to noxious in 4/4 extremities  Coordination:  not tested  Station/Gait:  not tested      Imaging  I personally reviewed all imaging; relevant findings per HPI.     Lab Results Data   CBC  Recent Labs   Lab 01/02/21  0532 01/01/21  0533 12/31/20  0442   WBC 19.2* 27.7* 25.7*   RBC 4.39* 4.23* 4.59   HGB 12.9* 12.6* 13.8   HCT 39.4* 38.4* 42.1    273 262     Basic Metabolic Panel    Recent Labs   Lab 01/02/21  0532 01/01/21  0533 12/31/20  0442   * 145* 139   POTASSIUM 4.0 3.6 3.5   CHLORIDE 112* 113* 110*   CO2 30 27 24   BUN 29 22 17   CR 1.05 0.93 1.04   * 172* 240*   HERNESTO 7.9* 8.1* 8.1*     Liver Panel  Recent Labs   Lab 01/02/21  0532 01/01/21  0533 12/31/20  0442   PROTTOTAL 6.3* 6.3* 6.8   ALBUMIN 1.9* 2.0* 2.3*   BILITOTAL 0.5 0.4 0.8   ALKPHOS 53 48 51   AST 48* 62* 44   ALT 64 60 57     INR  No lab results found.   Lipid Profile    Recent Labs   Lab Test 03/08/19  0845   CHOL 232*   HDL 41   *   TRIG 138     A1C    Recent Labs   Lab Test 12/30/20  2303   A1C 6.2*     Troponin I    Recent Labs   Lab 12/30/20  0830   TROPI <0.015

## 2021-01-02 NOTE — PROGRESS NOTES
Critical Care Progress Note      01/02/2021    Name: Stevie Dotson MRN#: [id number]   Age: 65 year old YOB: 1955     [facility] #3             Problem List:   Active Problems:    Encephalitis    Meningitis  Loss of protective airway reflexes  encephalopathy         Summary/Hospital Course:     65M without marked pmh now presented with encephalopathy in setting of MRI results suggesting recent seizure and now meningitis.  Worsening encephalopathy on floor resulting in loss of airway protective reflexes requiring intubation for airway protection. Unable to obtain further history at this time as pt is intubated/sedated.      Assessment and plan :     Stevie Dotson IS a 65 year old male admitted on 12/30/2020 for meningitis and encephalopathy requiring intubation for airway protection.   I have personally reviewed the daily labs, imaging studies, cultures and discussed the case with referring physician and consulting physicians.   My assessment and plan by system for this patient is as follows:    Neurology/Psychiatry:   Encephalopathy/encephalitis:  In setting of meningitis noted below as well as seizures. Admission MRI c/w active seizure vs post-ictal.  No further seizures on EEG.  Dexmedetomidine caused significant bradycardia.  - Continue keppra and fosphenytoin.  - Continue Propofol with Goal RASS 0 to -1. List Dexmedetomidine as an allergy.    Cardiovascular:   1.  Lactic acidosis:  Resolved 2.9 --> 1.8.  2.  bp acceptable for now. Antihypertensives vs pressors prn.    Pulmonary/Ventilator Management:   1.  Loss of protective airway reflexes:  In setting of encephalopathy noted above. Minimal ventilator settings. Doing OK on 10/5 pressure support right now.   - Pressure support trial and extubation when mental status allows.    GI and Nutrition :   1. Having significant OG tube output but not more than previous. In past when has been clamped has had a lot of  residuals.  - Try clamping again. Place post pyloric and try tube feeds at low dose.  - PPI for pud prophy    Renal/Fluids/Electrolytes:   1. MELE: Creatinine now stable  2.  Mild hyperNa:  To 145.  Stop IVF and increase free water.    Infectious Disease:   1. Bacteremia:  Strep pneumoniae on culture secondary to meningitis. pansensitive.  On ctx and vanco;  Appreciate ID input.  2. Meningitis:  Strep pneumo in csf, pan sensitive.  Continue vanco/ctx.  Appreciate ID input.  Continue dex 10 q6hr for 4 days.      Endocrine:   1. Hyperglycemia:  Sliding scale insulin.  Added lantus for coverage while on steroids with good effect; only 5 units sliding scale in last 24 hours. can probably discontinue this once steroids complete.    Hematology/Oncology:   1. WBC decreasing. Other labs stable.    ICU Prophylaxis:   1. DVT: Hep Subq/mechanical  2. VAP: HOB 30 degrees, chlorhexidine rinse  3. Stress Ulcer: PPIr  4. Restraints: Nonviolent soft two point restraints required and necessary for patient safety and continued cares and good effect as patient continues to pull at necessary lines, tubes despite education and distraction. Will readdress daily.   5. Wound care - per unit routine   6. Feeding - tf  7. Family Update: wife by phone  8. Disposition - icu           Interim History:     No significant events. Continues to have OG tube output but less than 1000ml per day. Wakes up when sedation is weaned. Per nurse very bradycardic on on Precedex.         Key Medications:       sodium chloride 0.9%  500 mL Intravenous Once     sodium chloride 0.9 %  100 mL Intravenous Once     cefTRIAXone  2 g Intravenous Q12H     chlorhexidine  15 mL Mouth/Throat Q12H     dexamethasone  10 mg Intravenous Q6H     fosphenytoin (CEREBYX) IV (loading dose)  2 mg PE/kg (Adjusted) Intravenous Q8H     heparin ANTICOAGULANT  5,000 Units Subcutaneous Q8H     insulin aspart  1-6 Units Subcutaneous Q4H     insulin glargine  10 Units Subcutaneous QAM AC      iopamidol  120 mL Intravenous Once     levETIRAcetam  1,000 mg Intravenous Q12H     multivitamins w/minerals  15 mL Per Feeding Tube Daily     pantoprazole  40 mg Oral Daily     protein modular  2 packet Per Feeding Tube Q6H     sodium chloride (PF)  3 mL Intracatheter Q8H     sodium chloride (PF)  3 mL Intracatheter Q8H       dexmedetomidine Stopped (01/01/21 1600)     propofol (DIPRIVAN) infusion 30 mcg/kg/min (01/02/21 0549)     NaCl 75 mL/hr at 01/02/21 0137     sodium chloride 10 mL/hr at 01/02/21 0539               Physical Examination:   Temp:  [96.1  F (35.6  C)-100.4  F (38  C)] 100  F (37.8  C)  Pulse:  [46-75] 70  Resp:  [0-24] 24  BP: ()/(52-80) 122/77  FiO2 (%):  [30 %] 30 %  SpO2:  [91 %-100 %] 91 %      Intake/Output Summary (Last 24 hours) at 1/2/2021 0605  Last data filed at 1/2/2021 0600  Gross per 24 hour   Intake 3495.11 ml   Output 3050 ml   Net 445.11 ml   Net: +1.6    Wt Readings from Last 4 Encounters:   01/02/21 115.8 kg (255 lb 4.7 oz)   12/30/20 113.4 kg (250 lb)   03/08/19 112.9 kg (249 lb)   11/14/18 115.4 kg (254 lb 8 oz)     BP - Mean:  [60-95] 93  Ventilation Mode: CMV/AC  (Continuous Mandatory Ventilation/ Assist Control)  FiO2 (%): 30 %  Rate Set (breaths/minute): 16 breaths/min  Tidal Volume Set (mL): 500 mL  PEEP (cm H2O): 5 cmH2O  Oxygen Concentration (%): 30 %  Resp: 24    Recent Labs   Lab 12/30/20  1640 12/30/20 0830   PH 7.41  --    PCO2 35  --    PO2 93  --    HCO3 22  --    O2PER  --  Room Air       GEN: intubated, sedated  HEENT: head ncat, sclera anicteric, MARNIE, OG tube, trachea midline   PULM: unlabored synchronous with vent, clear anteriorly    CV/COR: RRR S1S2  ABD: soft nontender, hypoactive bowel sounds, no mass  EXT:  Warm, trace edema, good pulses  NEURO: sedated which limits exam; perrl; purposeful localization to noxious stimuli when sedation weaned but not yet following.  SKIN: no obvious rash  LINES: no lines         Data:   All data and imaging  reviewed     ROUTINE ICU LABS (Last four results)  CMP  Recent Labs   Lab 01/02/21  0532 01/01/21  0533 12/31/20  0442 12/30/20  0830   * 145* 139 138   POTASSIUM 4.0 3.6 3.5 3.8   CHLORIDE 112* 113* 110* 104   CO2 30 27 24 29   ANIONGAP 3 5 5 5   * 172* 240* 224*   BUN 29 22 17 17   CR 1.05 0.93 1.04 0.89   GFRESTIMATED 74 86 75 90   GFRESTBLACK 86 >90 87 >90   HERNESTO 7.9* 8.1* 8.1* 9.2   MAG 2.7* 2.7* 2.6*  --    PHOS 3.1 2.8 1.8*  --    PROTTOTAL 6.3* 6.3* 6.8 8.3   ALBUMIN 1.9* 2.0* 2.3* 3.3*   BILITOTAL 0.5 0.4 0.8 1.8*   ALKPHOS 53 48 51 78   AST 48* 62* 44 38   ALT 64 60 57 70     CBC  Recent Labs   Lab 01/02/21  0532 01/01/21  0533 12/31/20  0442 12/30/20  0830   WBC 19.2* 27.7* 25.7* 28.8*   RBC 4.39* 4.23* 4.59 5.12   HGB 12.9* 12.6* 13.8 15.7   HCT 39.4* 38.4* 42.1 47.6   MCV 90 91 92 93   MCH 29.4 29.8 30.1 30.7   MCHC 32.7 32.8 32.8 33.0   RDW 14.6 14.6 14.5 14.0    273 262 329     INRNo lab results found in last 7 days.  Arterial Blood Gas  Recent Labs   Lab 12/30/20  1640 12/30/20  0830   PH 7.41  --    PCO2 35  --    PO2 93  --    HCO3 22  --    O2PER  --  Room Air       All cultures:  Recent Labs   Lab 12/30/20  2302 12/30/20  1033 12/30/20  0953 12/30/20  0937 12/30/20  0830   CULT No growth after 2 days No growth after 3 days Moderate growth  Streptococcus pneumoniae  *  Critical Value/Significant Value, preliminary result only, called to and read back by  Janis Zafar RN at 1014 [id number].DK   No growth Cultured on the 1st day of incubation:  Streptococcus pneumoniae  *  Critical Value/Significant Value, preliminary result only, called to and read back by  Abdullahi Brooks RN. @2321. 12.30.20. BS.     (Note)  POSITIVE for STREPTOCOCCUS PNEUMONIAE by [employer] multiplex nucleic  acid test. Note: Streptococcus mitis group is known to cross react  with S. pneumoniae. Correlation with culture results and clinical  presentation is necessary. Final identification and  antimicrobial  susceptibility testing will be verified by standard methods.    Specimen tested with [employer] multiplex, gram-positive blood culture  nucleic acid test for the following targets: Staph aureus, Staph  epidermidis, Staph lugdunensis, other Staph species, Enterococcus  faecalis, Enterococcus faecium, Streptococcus species, S. agalactiae,  S. anginosus grp., S. pneumoniae, S. pyogenes, Listeria sp., mecA  (methicillin resistance) and Robert/B (vancomycin resistance).    Critical Value/Significant Value called to and read back by Abdullahi Brooks at 0205 on 12.31.20 by .       No results found for this or any previous visit (from the past 24 hour(s)).  Labs (personally reviewed/interpreted):  See a/p    Billing: This patient is critically ill: Yes. Total critical care time today 45 min.

## 2021-01-03 ENCOUNTER — APPOINTMENT (OUTPATIENT)
Dept: MRI IMAGING | Facility: CLINIC | Age: 66
DRG: 871 | End: 2021-01-03
Attending: PHYSICIAN ASSISTANT
Payer: COMMERCIAL

## 2021-01-03 LAB
ALBUMIN SERPL-MCNC: 1.9 G/DL (ref 3.4–5)
ALP SERPL-CCNC: 53 U/L (ref 40–150)
ALT SERPL W P-5'-P-CCNC: 53 U/L (ref 0–70)
ANION GAP SERPL CALCULATED.3IONS-SCNC: 2 MMOL/L (ref 3–14)
AST SERPL W P-5'-P-CCNC: 25 U/L (ref 0–45)
BILIRUB SERPL-MCNC: 0.7 MG/DL (ref 0.2–1.3)
BUN SERPL-MCNC: 39 MG/DL (ref 7–30)
CALCIUM SERPL-MCNC: 8.2 MG/DL (ref 8.5–10.1)
CHLORIDE SERPL-SCNC: 113 MMOL/L (ref 94–109)
CO2 SERPL-SCNC: 29 MMOL/L (ref 20–32)
CREAT SERPL-MCNC: 0.83 MG/DL (ref 0.66–1.25)
ERYTHROCYTE [DISTWIDTH] IN BLOOD BY AUTOMATED COUNT: 14.5 % (ref 10–15)
GFR SERPL CREATININE-BSD FRML MDRD: >90 ML/MIN/{1.73_M2}
GLUCOSE BLDC GLUCOMTR-MCNC: 138 MG/DL (ref 70–99)
GLUCOSE BLDC GLUCOMTR-MCNC: 155 MG/DL (ref 70–99)
GLUCOSE BLDC GLUCOMTR-MCNC: 162 MG/DL (ref 70–99)
GLUCOSE BLDC GLUCOMTR-MCNC: 179 MG/DL (ref 70–99)
GLUCOSE SERPL-MCNC: 175 MG/DL (ref 70–99)
HCT VFR BLD AUTO: 39.3 % (ref 40–53)
HGB BLD-MCNC: 12.9 G/DL (ref 13.3–17.7)
MAGNESIUM SERPL-MCNC: 2.8 MG/DL (ref 1.6–2.3)
MCH RBC QN AUTO: 29.7 PG (ref 26.5–33)
MCHC RBC AUTO-ENTMCNC: 32.8 G/DL (ref 31.5–36.5)
MCV RBC AUTO: 90 FL (ref 78–100)
PHOSPHATE SERPL-MCNC: 3.3 MG/DL (ref 2.5–4.5)
PLATELET # BLD AUTO: 340 10E9/L (ref 150–450)
POTASSIUM SERPL-SCNC: 4 MMOL/L (ref 3.4–5.3)
PROT SERPL-MCNC: 6.4 G/DL (ref 6.8–8.8)
RBC # BLD AUTO: 4.35 10E12/L (ref 4.4–5.9)
SODIUM SERPL-SCNC: 144 MMOL/L (ref 133–144)
WBC # BLD AUTO: 19.8 10E9/L (ref 4–11)

## 2021-01-03 PROCEDURE — A9585 GADOBUTROL INJECTION: HCPCS | Performed by: INTERNAL MEDICINE

## 2021-01-03 PROCEDURE — 258N000003 HC RX IP 258 OP 636: Performed by: INTERNAL MEDICINE

## 2021-01-03 PROCEDURE — 999N000157 HC STATISTIC RCP TIME EA 10 MIN

## 2021-01-03 PROCEDURE — 250N000013 HC RX MED GY IP 250 OP 250 PS 637: Performed by: INTERNAL MEDICINE

## 2021-01-03 PROCEDURE — 84100 ASSAY OF PHOSPHORUS: CPT | Performed by: INTERNAL MEDICINE

## 2021-01-03 PROCEDURE — 999N000009 HC STATISTIC AIRWAY CARE

## 2021-01-03 PROCEDURE — 250N000011 HC RX IP 250 OP 636: Performed by: STUDENT IN AN ORGANIZED HEALTH CARE EDUCATION/TRAINING PROGRAM

## 2021-01-03 PROCEDURE — 250N000013 HC RX MED GY IP 250 OP 250 PS 637: Performed by: NURSE PRACTITIONER

## 2021-01-03 PROCEDURE — 250N000011 HC RX IP 250 OP 636: Performed by: INTERNAL MEDICINE

## 2021-01-03 PROCEDURE — 83735 ASSAY OF MAGNESIUM: CPT | Performed by: INTERNAL MEDICINE

## 2021-01-03 PROCEDURE — 99291 CRITICAL CARE FIRST HOUR: CPT | Performed by: INTERNAL MEDICINE

## 2021-01-03 PROCEDURE — 70553 MRI BRAIN STEM W/O & W/DYE: CPT

## 2021-01-03 PROCEDURE — 94003 VENT MGMT INPAT SUBQ DAY: CPT

## 2021-01-03 PROCEDURE — 85027 COMPLETE CBC AUTOMATED: CPT | Performed by: INTERNAL MEDICINE

## 2021-01-03 PROCEDURE — 70544 MR ANGIOGRAPHY HEAD W/O DYE: CPT

## 2021-01-03 PROCEDURE — 255N000002 HC RX 255 OP 636: Performed by: INTERNAL MEDICINE

## 2021-01-03 PROCEDURE — 200N000001 HC R&B ICU

## 2021-01-03 PROCEDURE — 999N001017 HC STATISTIC GLUCOSE BY METER IP

## 2021-01-03 PROCEDURE — 250N000012 HC RX MED GY IP 250 OP 636 PS 637: Performed by: INTERNAL MEDICINE

## 2021-01-03 PROCEDURE — 99232 SBSQ HOSP IP/OBS MODERATE 35: CPT | Mod: GC | Performed by: PSYCHIATRY & NEUROLOGY

## 2021-01-03 PROCEDURE — 36415 COLL VENOUS BLD VENIPUNCTURE: CPT | Performed by: INTERNAL MEDICINE

## 2021-01-03 PROCEDURE — 80053 COMPREHEN METABOLIC PANEL: CPT | Performed by: INTERNAL MEDICINE

## 2021-01-03 PROCEDURE — 999N000185 HC STATISTIC TRANSPORT TIME EA 15 MIN

## 2021-01-03 RX ORDER — SODIUM CHLORIDE 450 MG/100ML
INJECTION, SOLUTION INTRAVENOUS CONTINUOUS
Status: DISCONTINUED | OUTPATIENT
Start: 2021-01-03 | End: 2021-01-03

## 2021-01-03 RX ORDER — GADOBUTROL 604.72 MG/ML
12 INJECTION INTRAVENOUS ONCE
Status: COMPLETED | OUTPATIENT
Start: 2021-01-03 | End: 2021-01-03

## 2021-01-03 RX ADMIN — MIDAZOLAM HYDROCHLORIDE 2 MG: 1 INJECTION, SOLUTION INTRAMUSCULAR; INTRAVENOUS at 09:14

## 2021-01-03 RX ADMIN — Medication 2 PACKET: at 15:07

## 2021-01-03 RX ADMIN — DEXAMETHASONE SODIUM PHOSPHATE 10 MG: 4 INJECTION, SOLUTION INTRAMUSCULAR; INTRAVENOUS at 21:29

## 2021-01-03 RX ADMIN — HEPARIN SODIUM 5000 UNITS: 5000 INJECTION, SOLUTION INTRAVENOUS; SUBCUTANEOUS at 04:08

## 2021-01-03 RX ADMIN — INSULIN GLARGINE 10 UNITS: 100 INJECTION, SOLUTION SUBCUTANEOUS at 07:53

## 2021-01-03 RX ADMIN — HYDROMORPHONE HYDROCHLORIDE 0.5 MG: 1 INJECTION, SOLUTION INTRAMUSCULAR; INTRAVENOUS; SUBCUTANEOUS at 18:47

## 2021-01-03 RX ADMIN — MULTIVITAMIN 15 ML: LIQUID ORAL at 07:55

## 2021-01-03 RX ADMIN — Medication 2 PACKET: at 21:33

## 2021-01-03 RX ADMIN — MIDAZOLAM HYDROCHLORIDE 2 MG: 1 INJECTION, SOLUTION INTRAMUSCULAR; INTRAVENOUS at 12:09

## 2021-01-03 RX ADMIN — LEVETIRACETAM 1000 MG: 10 INJECTION INTRAVENOUS at 18:55

## 2021-01-03 RX ADMIN — MIDAZOLAM HYDROCHLORIDE 2 MG: 1 INJECTION, SOLUTION INTRAMUSCULAR; INTRAVENOUS at 12:47

## 2021-01-03 RX ADMIN — HYDROMORPHONE HYDROCHLORIDE 0.5 MG: 1 INJECTION, SOLUTION INTRAMUSCULAR; INTRAVENOUS; SUBCUTANEOUS at 07:55

## 2021-01-03 RX ADMIN — ACETAMINOPHEN 650 MG: 650 SUPPOSITORY RECTAL at 07:54

## 2021-01-03 RX ADMIN — SODIUM CHLORIDE 1000 ML: 9 INJECTION, SOLUTION INTRAVENOUS at 13:44

## 2021-01-03 RX ADMIN — CEFTRIAXONE SODIUM 2 G: 2 INJECTION, POWDER, FOR SOLUTION INTRAMUSCULAR; INTRAVENOUS at 09:01

## 2021-01-03 RX ADMIN — PANTOPRAZOLE SODIUM 40 MG: 40 TABLET, DELAYED RELEASE ORAL at 07:55

## 2021-01-03 RX ADMIN — HYDROMORPHONE HYDROCHLORIDE 0.5 MG: 1 INJECTION, SOLUTION INTRAMUSCULAR; INTRAVENOUS; SUBCUTANEOUS at 21:29

## 2021-01-03 RX ADMIN — HYDROMORPHONE HYDROCHLORIDE 0.5 MG: 1 INJECTION, SOLUTION INTRAMUSCULAR; INTRAVENOUS; SUBCUTANEOUS at 09:14

## 2021-01-03 RX ADMIN — INSULIN ASPART 1 UNITS: 100 INJECTION, SOLUTION INTRAVENOUS; SUBCUTANEOUS at 07:53

## 2021-01-03 RX ADMIN — DEXAMETHASONE SODIUM PHOSPHATE 10 MG: 4 INJECTION, SOLUTION INTRAMUSCULAR; INTRAVENOUS at 15:07

## 2021-01-03 RX ADMIN — SODIUM CHLORIDE: 9 INJECTION, SOLUTION INTRAVENOUS at 00:00

## 2021-01-03 RX ADMIN — INSULIN ASPART 1 UNITS: 100 INJECTION, SOLUTION INTRAVENOUS; SUBCUTANEOUS at 18:52

## 2021-01-03 RX ADMIN — Medication 2 PACKET: at 04:08

## 2021-01-03 RX ADMIN — LEVETIRACETAM 1000 MG: 10 INJECTION INTRAVENOUS at 07:55

## 2021-01-03 RX ADMIN — INSULIN ASPART 1 UNITS: 100 INJECTION, SOLUTION INTRAVENOUS; SUBCUTANEOUS at 04:15

## 2021-01-03 RX ADMIN — HEPARIN SODIUM 5000 UNITS: 5000 INJECTION, SOLUTION INTRAVENOUS; SUBCUTANEOUS at 21:29

## 2021-01-03 RX ADMIN — Medication 2 PACKET: at 09:01

## 2021-01-03 RX ADMIN — CEFTRIAXONE SODIUM 2 G: 2 INJECTION, POWDER, FOR SOLUTION INTRAMUSCULAR; INTRAVENOUS at 21:29

## 2021-01-03 RX ADMIN — CHLORHEXIDINE GLUCONATE 0.12% ORAL RINSE 15 ML: 1.2 LIQUID ORAL at 07:55

## 2021-01-03 RX ADMIN — GADOBUTROL 12 ML: 604.72 INJECTION INTRAVENOUS at 13:19

## 2021-01-03 RX ADMIN — HYDROMORPHONE HYDROCHLORIDE 0.5 MG: 1 INJECTION, SOLUTION INTRAMUSCULAR; INTRAVENOUS; SUBCUTANEOUS at 12:47

## 2021-01-03 RX ADMIN — HEPARIN SODIUM 5000 UNITS: 5000 INJECTION, SOLUTION INTRAVENOUS; SUBCUTANEOUS at 12:47

## 2021-01-03 RX ADMIN — CHLORHEXIDINE GLUCONATE 0.12% ORAL RINSE 15 ML: 1.2 LIQUID ORAL at 18:55

## 2021-01-03 RX ADMIN — HYDROMORPHONE HYDROCHLORIDE 0.5 MG: 1 INJECTION, SOLUTION INTRAMUSCULAR; INTRAVENOUS; SUBCUTANEOUS at 02:15

## 2021-01-03 RX ADMIN — HYDROMORPHONE HYDROCHLORIDE 0.5 MG: 1 INJECTION, SOLUTION INTRAMUSCULAR; INTRAVENOUS; SUBCUTANEOUS at 05:50

## 2021-01-03 RX ADMIN — DEXAMETHASONE SODIUM PHOSPHATE 10 MG: 4 INJECTION, SOLUTION INTRAMUSCULAR; INTRAVENOUS at 04:08

## 2021-01-03 RX ADMIN — INSULIN ASPART 1 UNITS: 100 INJECTION, SOLUTION INTRAVENOUS; SUBCUTANEOUS at 23:55

## 2021-01-03 RX ADMIN — DEXAMETHASONE SODIUM PHOSPHATE 10 MG: 4 INJECTION, SOLUTION INTRAMUSCULAR; INTRAVENOUS at 09:00

## 2021-01-03 ASSESSMENT — MIFFLIN-ST. JEOR: SCORE: 1995.88

## 2021-01-03 ASSESSMENT — ACTIVITIES OF DAILY LIVING (ADL)
ADLS_ACUITY_SCORE: 26
ADLS_ACUITY_SCORE: 27
ADLS_ACUITY_SCORE: 26
ADLS_ACUITY_SCORE: 27
ADLS_ACUITY_SCORE: 26
ADLS_ACUITY_SCORE: 26

## 2021-01-03 ASSESSMENT — VISUAL ACUITY: OU: NOT TESTABLE

## 2021-01-03 NOTE — PROGRESS NOTES
Extubation Note    Successful completion of SBT (Yes or No): Yes  Extubation time: 1335    Patient assessment:  Lung sounds: Clear  Stridor Present (Yes or No): No  Patient tolerance: Well    Oxygen device: nasal cannula  Liter flow: 4lpm  SpO2: 96%    Plan: Continue to monitor.     Regina Ashley, RT

## 2021-01-03 NOTE — PROGRESS NOTES
Preliminary Video EEG impression on January 2-3, 2021  Until 8am     This video EEG is abnormal due to the presences of continuous generalized polymorphic delta/theta slowing with superimposed alpha activity.  There is a persistent asymmetry with right hemisphere being more attenuated compared to the left hemisphere.  Additionally the left hemisphere has variable frequencies up to 13 Hz throughout the file.  There is rhythmic delta activity 1 to 2 Hz in frequency up to 60  V when patient is stimulated.      Propofol was stopped January 2 morning and EEG did have more mixed frequencies, however there was maximum slowing in the right hemisphere.      This EEG is consistent with a severe diffuse encephalopathy with maximum cortical dysfunction in the right hemisphere.  After propofol is stopped EEG has increased mixed variable frequencies.  No sustained periodic patterns are appreciated overnight, there is superimposed sharply contoured delta activity which is thought to be part of the encephalopathic process and not seizures.   No clear epileptiform discharges or electrographic seizures were seen in this record. If events of interest are noted, please press the event button. Clinical correlation is advised.     Najma Del Valle MD  Staff Epilepsy Neurologist   [phone]

## 2021-01-03 NOTE — PROGRESS NOTES
"    [facility]    Stroke/Neurocritical Care Progress Note    Interval Events  Febrile on a number of occasions to 100.4, much more awake this am, nodding and shaking head appropriately to answer questions. Wants the tube out, denies pain, dyspnea, chest pain, reports feeling anxious about the tube and being here.    Impression  1. Strep pneumoniae meningitis and bacteremia, source as of yet unidentified, however most likely pulmonary per ID. Consider spine imaging if source control not achieved.  2. Right hemispheric focal slowing lasting longer than anticipated for just post-ictal effect  3. Obtundation, secondary to above and related to sedative effect    Plan  Continue ceftri per ID  Continue keppra 1 g IV q12  Discontinue fosphenytoin  If seizes, maximize keppra  Okay to discontinue vEEG  Continue Q2 hour neurochecks    The Stroke/Neurocritical Care Staff is Dr. Wong.    Danyelle Guardado MD  Vascular Neurology Fellow  To page me or covering stroke neurology team member, click here: AMCOM   Choose \"On Call\" tab at top, then search dropdown box for \"Neurology Adult\", select location, press Enter, then look for stroke/neuro ICU/telestroke.    ______________________________________________________    Medications   Home Meds  Prior to Admission medications    Medication Sig Start Date End Date Taking? Authorizing Provider   fluticasone (FLONASE) 50 MCG/ACT nasal spray Spray 1 spray into both nostrils 2 times daily   Yes Unknown, Entered By History   omeprazole (PRILOSEC) 20 MG DR capsule Take 20 mg by mouth daily   Yes Unknown, Entered By History   oxymetazoline (AFRIN) 0.05 % nasal spray Spray 2 sprays into both nostrils 2 times daily   Yes Unknown, Entered By History   sildenafil (VIAGRA) 100 MG tablet Take 1 tablet (100 mg) by mouth daily as needed (ED) 3/8/19  Yes Agusto Holguin MD       Scheduled Meds    sodium chloride 0.9%  500 mL Intravenous Once     sodium chloride 0.9 %  100 mL " Intravenous Once     cefTRIAXone  2 g Intravenous Q12H     chlorhexidine  15 mL Mouth/Throat Q12H     dexamethasone  10 mg Intravenous Q6H     [Held by provider] fosphenytoin (CEREBYX) IV (loading dose)  2 mg PE/kg (Adjusted) Intravenous Q8H     heparin ANTICOAGULANT  5,000 Units Subcutaneous Q8H     insulin aspart  1-6 Units Subcutaneous Q4H     insulin glargine  10 Units Subcutaneous QAM AC     iopamidol  120 mL Intravenous Once     levETIRAcetam  1,000 mg Intravenous Q12H     multivitamins w/minerals  15 mL Per Feeding Tube Daily     pantoprazole  40 mg Oral Daily     protein modular  2 packet Per Feeding Tube Q6H     sodium chloride (PF)  3 mL Intracatheter Q8H       Infusion Meds    propofol (DIPRIVAN) infusion Stopped (01/02/21 0800)     sodium chloride 10 mL/hr at 01/03/21 0000       PRN Meds  acetaminophen, glucose **OR** dextrose **OR** glucagon, HYDROmorphone, lidocaine 4%, lidocaine (buffered or not buffered), melatonin, midazolam, naloxone **OR** naloxone **OR** naloxone **OR** naloxone, nitroGLYcerin, propofol (DIPRIVAN) infusion **AND** propofol **AND** CK total **AND** Triglycerides, sodium chloride (PF)       PHYSICAL EXAMINATION  Temp:  [98.6  F (37  C)-100.4  F (38  C)] 98.6  F (37  C)  Pulse:  [] 72  Resp:  [10-22] 12  BP: (118-192)/() 162/111  FiO2 (%):  [30 %] 30 %  SpO2:  [93 %-99 %] 94 %     General:  patient lying in bed without any acute distress    HEENT:  normocephalic/atraumatic, intubated  Cardio:  RRR  Pulmonary:  no respiratory distress, on mechanical ventilation  Abdomen:  soft, non-tender, non-distended  Extremities:  no edema, peripheral pulses palpable  Skin:  intact, warm/dry     Neurologic  Mental Status:  examined on no sedation, eyes open spontaneously, follows commands readily, shakes and nods to answer questions  Cranial Nerves:  blinks to threat bilaterally, PERRL 3 mm, intact corneals and VOR, cough and gag intact, breathes over vent set rate  Motor:  normal  muscle tone and bulk, weak antigravity throughout  Reflexes:  brisk throughout  Sensory:  detects stim in 4/4 extremities  Coordination:  not tested  Station/Gait:  not tested      Imaging  I personally reviewed all imaging; relevant findings per HPI.     Lab Results Data   CBC  Recent Labs   Lab 01/03/21 0623 01/02/21  0532 01/01/21  0533   WBC 19.8* 19.2* 27.7*   RBC 4.35* 4.39* 4.23*   HGB 12.9* 12.9* 12.6*   HCT 39.3* 39.4* 38.4*    264 273     Basic Metabolic Panel    Recent Labs   Lab 01/03/21  0623 01/02/21  0532 01/01/21  0533    145* 145*   POTASSIUM 4.0 4.0 3.6   CHLORIDE 113* 112* 113*   CO2 29 30 27   BUN 39* 29 22   CR 0.83 1.05 0.93   * 145* 172*   HERNESTO 8.2* 7.9* 8.1*     Liver Panel  Recent Labs   Lab 01/03/21  0623 01/02/21  0532 01/01/21  0533   PROTTOTAL 6.4* 6.3* 6.3*   ALBUMIN 1.9* 1.9* 2.0*   BILITOTAL 0.7 0.5 0.4   ALKPHOS 53 53 48   AST 25 48* 62*   ALT 53 64 60     INR  No lab results found.   Lipid Profile    Recent Labs   Lab Test 03/08/19  0845   CHOL 232*   HDL 41   *   TRIG 138     A1C    Recent Labs   Lab Test 12/30/20  2303   A1C 6.2*     Troponin I    Recent Labs   Lab 12/30/20  0830   TROPI <0.015

## 2021-01-03 NOTE — PROGRESS NOTES
Critical Care Progress Note      01/03/2021    Name: Stevie Dotson MRN#: [id number]   Age: 65 year old YOB: 1955     \[facility] [place] [facility]\"" Day# 4                   Problem List:   Active Problems:    Encephalitis    Meningitis  Loss of protective airway reflexes  encephalopathy         Summary/Hospital Course:     65M without marked pmh now presented with encephalopathy in setting of MRI results suggesting recent seizure and now meningitis.  Worsening encephalopathy on floor resulting in loss of airway protective reflexes requiring intubation for airway protection. Patient has improved significantly and is now able to follow commands. Looks calm. Breathing very well on 5/5 settings.       Assessment and plan :     Stevie Dotson IS a 65 year old male admitted on 12/30/2020 for meningitis and encephalopathy requiring intubation for airway protection.   I have personally reviewed the daily labs, imaging studies, cultures and discussed the case with referring physician and consulting physicians.   My assessment and plan by system for this patient is as follows:    Neurology/Psychiatry:   Encephalopathy/encephalitis:  In setting of meningitis noted below as well as seizures. Admission MRI c/w active seizure vs post-ictal.  No record of seizures on EEG.Dexmedetomidine caused significant bradycardia. Improved mental status and calm today. Going for MRI  - Continue keppra. Fosphenytoin has been stopped. Will monitor for seizure activity.   - Stop Propofol on extubation.     Cardiovascular:   1. Lactic acidosis:  Resolved 2.9 --> 1.8.  2. Blood pressure normal to high here. Outpatient records note elevated BP with recommendation for followup and consideration of medication in early 2019.  - Start medication if persistently hypertensive    Pulmonary/Ventilator Management:   1.  Loss of protective airway reflexes:  In setting of encephalopathy noted above. Minimal ventilator settings. Doing well on 5/5 extubation from  a respiratory standpoint. Also calm and collected.  - Trial of extubation after MRI.    GI and Nutrition :   1. Tolerating tube feeds. Will continue    Renal/Fluids/Electrolytes:   1. MELE: Creatinine now stable but labs suggest dehydration  - bolus, IVF for 5 hours and increase free water.    Infectious Disease:   1. Bacteremia:  Strep pneumoniae on culture secondary to meningitis. pansensitive.  On ctx and vanco;  Appreciate ID input.  2. Meningitis:  Strep pneumo in csf, pan sensitive.  Continue vanco/ctx.  Appreciate ID input.  Continue dex 10 q6hr for 4 days.      Endocrine:   1. Hyperglycemia in setting of pre diabetes:  Sliding scale insulin.  Added lantus for coverage while on steroids with good effect; only 5 units sliding scale in last 24 hours. can probably discontinue this once steroids complete.    Hematology/Oncology:   Stable labs    ICU Prophylaxis:   1. DVT: Hep Subq/mechanical  2. VAP: HOB 30 degrees, chlorhexidine rinse  3. Stress Ulcer: PPIr  4. Restraints: Nonviolent soft two point restraints required and necessary for patient safety and continued cares and good effect as patient continues to pull at necessary lines, tubes despite education and distraction. Will readdress daily.   5. Wound care - per unit routine   6. Feeding - tf  7. Family Update: not by me today yet  8. Disposition - icu           Interim History:     Awake and calm and breathing comfortably         Null Medications:       sodium chloride 0.9%  500 mL Intravenous Once     sodium chloride 0.9 %  100 mL Intravenous Once     cefTRIAXone  2 g Intravenous Q12H     chlorhexidine  15 mL Mouth/Throat Q12H     dexamethasone  10 mg Intravenous Q6H     [Held by provider] fosphenytoin (CEREBYX) IV (loading dose)  2 mg PE/kg (Adjusted) Intravenous Q8H     gadobutrol  12 mL Intravenous Once     heparin ANTICOAGULANT  5,000 Units Subcutaneous Q8H     insulin aspart  1-6 Units Subcutaneous Q4H     insulin glargine  10 Units Subcutaneous QAM AC      iopamidol  120 mL Intravenous Once     levETIRAcetam  1,000 mg Intravenous Q12H     multivitamins w/minerals  15 mL Per Feeding Tube Daily     pantoprazole  40 mg Oral Daily     protein modular  2 packet Per Feeding Tube Q6H     sodium chloride (PF)  3 mL Intracatheter Q8H       propofol (DIPRIVAN) infusion Stopped (01/02/21 0800)     sodium chloride 10 mL/hr at 01/03/21 0000               Physical Examination:   Temp:  [99.1  F (37.3  C)-100.4  F (38  C)] 99.1  F (37.3  C)  Pulse:  [50-72] 56  Resp:  [10-17] 10  BP: (114-149)/(71-98) 149/98  FiO2 (%):  [30 %] 30 %  SpO2:  [93 %-99 %] 99 %      Intake/Output Summary (Last 24 hours) at 1/3/2021 1323  Last data filed at 1/3/2021 1000  Gross per 24 hour   Intake 1310 ml   Output 2350 ml   Net -1040 ml   Net: even      Wt Readings from Last 4 Encounters:   01/03/21 115.7 kg (255 lb 1.2 oz)   12/30/20 113.4 kg (250 lb)   03/08/19 112.9 kg (249 lb)   11/14/18 115.4 kg (254 lb 8 oz)     BP - Mean:  [] 113  Ventilation Mode: CPAP/PS  (Continuous positive airway pressure with Pressure Support)  FiO2 (%): 30 %  Rate Set (breaths/minute): 16 breaths/min  Tidal Volume Set (mL): 500 mL  PEEP (cm H2O): 5 cmH2O  Pressure Support (cm H2O): 5 cmH2O  Oxygen Concentration (%): 30 %  Resp: 10    Recent Labs   Lab 12/30/20  1640 12/30/20  0830   PH 7.41  --    PCO2 35  --    PO2 93  --    HCO3 22  --    O2PER  --  Room Air       GEN: intubated, awake and calm  HEENT: ET tube, MARNIE, EEG leads on  PULM: comfortable and synchronous  CV/COR: RRR S1S2  ABD: soft nontender, hypoactive bowel sounds, no mass  EXT:  Warm, trace edema, good pulses  NEURO: follows commands, equal upper extremity strength  SKIN: no obvious rash  LINES: no lines         Data:   All data and imaging reviewed     ROUTINE ICU LABS (Last four results)  CMP  Recent Labs   Lab 01/03/21  0623 01/02/21  0532 01/01/21  0533 12/31/20  0442    145* 145* 139   POTASSIUM 4.0 4.0 3.6 3.5   CHLORIDE 113* 112* 113*  110*   CO2 29 30 27 24   ANIONGAP 2* 3 5 5   * 145* 172* 240*   BUN 39* 29 22 17   CR 0.83 1.05 0.93 1.04   GFRESTIMATED >90 74 86 75   GFRESTBLACK >90 86 >90 87   HERNESTO 8.2* 7.9* 8.1* 8.1*   MAG 2.8* 2.7* 2.7* 2.6*   PHOS 3.3 3.1 2.8 1.8*   PROTTOTAL 6.4* 6.3* 6.3* 6.8   ALBUMIN 1.9* 1.9* 2.0* 2.3*   BILITOTAL 0.7 0.5 0.4 0.8   ALKPHOS 53 53 48 51   AST 25 48* 62* 44   ALT 53 64 60 57     CBC  Recent Labs   Lab 01/03/21  0623 01/02/21  0532 01/01/21  0533 12/31/20  0442   WBC 19.8* 19.2* 27.7* 25.7*   RBC 4.35* 4.39* 4.23* 4.59   HGB 12.9* 12.9* 12.6* 13.8   HCT 39.3* 39.4* 38.4* 42.1   MCV 90 90 91 92   MCH 29.7 29.4 29.8 30.1   MCHC 32.8 32.7 32.8 32.8   RDW 14.5 14.6 14.6 14.5    264 273 262     INRNo lab results found in last 7 days.  Arterial Blood Gas  Recent Labs   Lab 12/30/20  1640 12/30/20  0830   PH 7.41  --    PCO2 35  --    PO2 93  --    HCO3 22  --    O2PER  --  Room Air       All cultures:  Recent Labs   Lab 12/30/20  2302 12/30/20  1033 12/30/20  0953 12/30/20  0937 12/30/20  0830   CULT No growth after 3 days No growth after 4 days Moderate growth  Streptococcus pneumoniae  *  Critical Value/Significant Value, preliminary result only, called to and read back by  Janis Zafar RN at 1014 [email or web address]   No growth Cultured on the 1st day of incubation:  Streptococcus pneumoniae  *  Critical Value/Significant Value, preliminary result only, called to and read back by  Abdullahi Brooks RN. @0619. [id number]. BS.     (Note)  POSITIVE for STREPTOCOCCUS PNEUMONIAE by [employer] multiplex nucleic  acid test. Note: Streptococcus mitis group is known to cross react  with S. pneumoniae. Correlation with culture results and clinical  presentation is necessary. Final identification and antimicrobial  susceptibility testing will be verified by standard methods.    Specimen tested with [employer] multiplex, gram-positive blood culture  nucleic acid test for the following targets: Staph aureus,  Staph  epidermidis, Staph lugdunensis, other Staph species, Enterococcus  faecalis, Enterococcus faecium, Streptococcus species, S. agalactiae,  S. anginosus grp., S. pneumoniae, S. pyogenes, Listeria sp., mecA  (methicillin resistance) and Robert/B (vancomycin resistance).    Critical Value/Significant Value called to and read back by Abdullahi Brooks at 0205 on 12.31.20 by SS.       No results found for this or any previous visit (from the past 24 hour(s)).  Labs (personally reviewed/interpreted):  See a/p    TTE: grossly normal    Billing: This patient is critically ill: Yes. Total critical care time today 45 min.

## 2021-01-03 NOTE — PROGRESS NOTES
Patient transported to MRI on transport vent. No Complications. Patient placed on following vent settings:    Ventilation Mode: CPAP/PS  (Continuous positive airway pressure with Pressure Support)  FiO2 (%): 30 %  Rate Set (breaths/minute): 16 breaths/min  Tidal Volume Set (mL): 500 mL  PEEP (cm H2O): 5 cmH2O  Pressure Support (cm H2O): 5 cmH2O  Oxygen Concentration (%): 30 %  Resp: 10        Regina Ashley, RT

## 2021-01-03 NOTE — PROGRESS NOTES
[facility]    Infectious Disease Progress Note    Date of Service (when I saw the patient): 01/03/2021     Assessment & Plan   Stevie Dotson is a 65 year old male who was admitted on 12/30/2020.     Impression:  1. 65 y.o male with admitted with confusion and fever.   2. Progressive encephalopathy in setting of MRI results suggesting recent seizure   3. LP done suggestive of meningitis.    4. Started on broad spectrum antimicrobials and steroids   5. Intubated due to progressive encephalopathy.   6. CSF cultures now coming back with GPC In pairs and chains which along with positive blood cultures for step pneumo explains the presentation.        Recommendations:   Continue on Ceftriaxone BID dosing alone can stop vancomycin.   Steroids at the least for 4 days for meningitis, more per ICU     Ria Palma MD    Interval History   Cultures as listed   Stays intubated   Fever jessica r      Physical Exam   Temp: 99.1  F (37.3  C) Temp src: Bladder BP: (!) 149/98 Pulse: 56   Resp: 10 SpO2: 99 % O2 Device: Mechanical Ventilator    Vitals:    01/01/21 0400 01/02/21 0600 01/03/21 0600   Weight: 114 kg (251 lb 5.2 oz) 115.8 kg (255 lb 4.7 oz) 115.7 kg (255 lb 1.2 oz)     Vital Signs with Ranges  Temp:  [99.1  F (37.3  C)-100.4  F (38  C)] 99.1  F (37.3  C)  Pulse:  [50-72] 56  Resp:  [10-17] 10  BP: (114-149)/(71-98) 149/98  FiO2 (%):  [30 %] 30 %  SpO2:  [93 %-99 %] 99 %    Constitutional: intubated and sedated   Lungs: Clear to auscultation bilaterally, no crackles or wheezing  Cardiovascular: Regular rate and rhythm, normal S1 and S2, and no murmur noted  Abdomen: Normal bowel sounds, soft, non-distended, non-tender  Skin: No rashes, no cyanosis, no edema  Other:    Medications     propofol (DIPRIVAN) infusion Stopped (01/02/21 0800)     sodium chloride 10 mL/hr at 01/03/21 0000       sodium chloride 0.9%  500 mL Intravenous Once     sodium chloride 0.9 %  100 mL Intravenous Once      cefTRIAXone  2 g Intravenous Q12H     chlorhexidine  15 mL Mouth/Throat Q12H     dexamethasone  10 mg Intravenous Q6H     [Held by provider] fosphenytoin (CEREBYX) IV (loading dose)  2 mg PE/kg (Adjusted) Intravenous Q8H     gadobutrol  12 mL Intravenous Once     heparin ANTICOAGULANT  5,000 Units Subcutaneous Q8H     insulin aspart  1-6 Units Subcutaneous Q4H     insulin glargine  10 Units Subcutaneous QAM AC     iopamidol  120 mL Intravenous Once     levETIRAcetam  1,000 mg Intravenous Q12H     multivitamins w/minerals  15 mL Per Feeding Tube Daily     pantoprazole  40 mg Oral Daily     protein modular  2 packet Per Feeding Tube Q6H     sodium chloride (PF)  3 mL Intracatheter Q8H       Data   All microbiology laboratory data reviewed.  Recent Labs   Lab Test 01/03/21  0623 01/02/21  0532 01/01/21  0533   WBC 19.8* 19.2* 27.7*   HGB 12.9* 12.9* 12.6*   HCT 39.3* 39.4* 38.4*   MCV 90 90 91    264 273     Recent Labs   Lab Test 01/03/21  0623 01/02/21  0532 01/01/21  0533   CR 0.83 1.05 0.93     No lab results found.  Recent Labs   Lab Test 12/30/20  2302 12/30/20  1033 12/30/20  0953 12/30/20  0937 12/30/20  0830   CULT No growth after 3 days No growth after 4 days Moderate growth  Streptococcus pneumoniae  *  Critical Value/Significant Value, preliminary result only, called to and read back by  Janis Zafar RN at 1044 [email or web address]   No growth Cultured on the 1st day of incubation:  Streptococcus pneumoniae  *  Critical Value/Significant Value, preliminary result only, called to and read back by  Abdullahi Brooks RN. @2321. 12.30.20. BS.     (Note)  POSITIVE for STREPTOCOCCUS PNEUMONIAE by [employer] multiplex nucleic  acid test. Note: Streptococcus mitis group is known to cross react  with S. pneumoniae. Correlation with culture results and clinical  presentation is necessary. Final identification and antimicrobial  susceptibility testing will be verified by standard methods.    Specimen tested with [employer]  multiplex, gram-positive blood culture  nucleic acid test for the following targets: Staph aureus, Staph  epidermidis, Staph lugdunensis, other Staph species, Enterococcus  faecalis, Enterococcus faecium, Streptococcus species, S. agalactiae,  S. anginosus grp., S. pneumoniae, S. pyogenes, Listeria sp., mecA  (methicillin resistance) and Robert/B (vancomycin resistance).    Critical Value/Significant Value called to and read back by Abdullahi Brooks at 0205 on 12.31.20 by SS.         Attestation:  Total time on the floor involved in the patient's care: 35 minutes. Total time spent in counseling/care coordination: >50%

## 2021-01-03 NOTE — PLAN OF CARE
No changes overnight.    N: Pt opens eyes to voice and follows commands. PERRLA. Moves all extremities.   CV: Sinus bradycardia. SBP stable.  LS: Full vent support overnight FIO2 30% Lungs clear/coarse with minimal secretions  GI/:Adequate urine output through ervin. TF advanced to 20 per order. Small amount of residual via OG. 150 FWF Q4. No BM overnight   IV: TKO through PIV.    Dilaudid given x2 for comfort and vent synchrony-tends to stack breaths.

## 2021-01-03 NOTE — PROGRESS NOTES
[facility] ICU RESPIRATORY NOTE        Date of Admission: 12/30/2020     Date of Intubation (most recent): 12/30/2020     Reason for Mechanical Ventilation: Airway protection     Number of Days on Mechanical Ventilation: 5     Met Criteria for Spontaneous Breathing Trial: yes     Reason for No Spontaneous Breathing Trial: No per MD     Significant Events Tonight: None this shift.     Ventilation Mode: CMV/AC  (Continuous Mandatory Ventilation/ Assist Control)  FiO2 (%): 30 %  Rate Set (breaths/minute): 16 breaths/min  Tidal Volume Set (mL): 500 mL  PEEP (cm H2O): 5 cmH2O  Pressure Support (cm H2O): 5 cmH2O  Oxygen Concentration (%): 30 %  Resp: 13    Recent Labs   Lab 12/30/20  1640 12/30/20  0830   PH 7.41  --    PCO2 35  --    PO2 93  --    HCO3 22  --    O2PER  --  Room Air   Plan: Continue full vent support tonight

## 2021-01-03 NOTE — PROGRESS NOTES
[facility] ICU RESPIRATORY NOTE        Date of Admission: 12/30/2020    Date of Intubation (most recent): 12/30/20    Reason for Mechanical Ventilation: airway protection    Number of Days on Mechanical Ventilation: 4    Met Criteria for Spontaneous Breathing Trial: Yes.         Significant Events Today: The patient was PS 5/5 since 0730 am until 8:00 Pm today. Now the pt is on full ventilatory support.    ABG Results:   Recent Labs   Lab 12/30/20  1640 12/30/20  0830   PH 7.41  --    PCO2 35  --    PO2 93  --    HCO3 22  --    O2PER  --  Room Air       Current Vent Settings: Ventilation Mode: CMV/AC  (Continuous Mandatory Ventilation/ Assist Control)  FiO2 (%): 30 %  Rate Set (breaths/minute): 16 breaths/min  Tidal Volume Set (mL): 500 mL  PEEP (cm H2O): 5 cmH2O  Pressure Support (cm H2O): 5 cmH2O  Oxygen Concentration (%): 30 %  Resp: 17      Plan:  To keep the patient on full support tonight and will keep weaning as tolerates.     Jayne Rivera, RT

## 2021-01-04 ENCOUNTER — APPOINTMENT (OUTPATIENT)
Dept: SPEECH THERAPY | Facility: CLINIC | Age: 66
DRG: 871 | End: 2021-01-04
Attending: NURSE PRACTITIONER
Payer: COMMERCIAL

## 2021-01-04 LAB
ALBUMIN SERPL-MCNC: 2 G/DL (ref 3.4–5)
ALP SERPL-CCNC: 52 U/L (ref 40–150)
ALT SERPL W P-5'-P-CCNC: 52 U/L (ref 0–70)
ANION GAP SERPL CALCULATED.3IONS-SCNC: 4 MMOL/L (ref 3–14)
ASPERGILLUS AB SER QL ID: NORMAL
AST SERPL W P-5'-P-CCNC: 33 U/L (ref 0–45)
B DERMAT AB SER QL ID: NORMAL
BILIRUB SERPL-MCNC: 0.8 MG/DL (ref 0.2–1.3)
BUN SERPL-MCNC: 38 MG/DL (ref 7–30)
CALCIUM SERPL-MCNC: 8 MG/DL (ref 8.5–10.1)
CHLORIDE SERPL-SCNC: 111 MMOL/L (ref 94–109)
CO2 SERPL-SCNC: 28 MMOL/L (ref 20–32)
CREAT SERPL-MCNC: 0.78 MG/DL (ref 0.66–1.25)
ERYTHROCYTE [DISTWIDTH] IN BLOOD BY AUTOMATED COUNT: 13.7 % (ref 10–15)
GFR SERPL CREATININE-BSD FRML MDRD: >90 ML/MIN/{1.73_M2}
GLUCOSE BLDC GLUCOMTR-MCNC: 113 MG/DL (ref 70–99)
GLUCOSE BLDC GLUCOMTR-MCNC: 121 MG/DL (ref 70–99)
GLUCOSE BLDC GLUCOMTR-MCNC: 122 MG/DL (ref 70–99)
GLUCOSE BLDC GLUCOMTR-MCNC: 139 MG/DL (ref 70–99)
GLUCOSE BLDC GLUCOMTR-MCNC: 148 MG/DL (ref 70–99)
GLUCOSE BLDC GLUCOMTR-MCNC: 149 MG/DL (ref 70–99)
GLUCOSE SERPL-MCNC: 145 MG/DL (ref 70–99)
HCT VFR BLD AUTO: 39.3 % (ref 40–53)
HGB BLD-MCNC: 13.1 G/DL (ref 13.3–17.7)
MAGNESIUM SERPL-MCNC: 2.5 MG/DL (ref 1.6–2.3)
MCH RBC QN AUTO: 29.9 PG (ref 26.5–33)
MCHC RBC AUTO-ENTMCNC: 33.3 G/DL (ref 31.5–36.5)
MCV RBC AUTO: 90 FL (ref 78–100)
PHOSPHATE SERPL-MCNC: 3.2 MG/DL (ref 2.5–4.5)
PLATELET # BLD AUTO: 387 10E9/L (ref 150–450)
POTASSIUM SERPL-SCNC: 3.6 MMOL/L (ref 3.4–5.3)
PROT SERPL-MCNC: 6.3 G/DL (ref 6.8–8.8)
RBC # BLD AUTO: 4.38 10E12/L (ref 4.4–5.9)
SODIUM SERPL-SCNC: 143 MMOL/L (ref 133–144)
WBC # BLD AUTO: 23.9 10E9/L (ref 4–11)

## 2021-01-04 PROCEDURE — 250N000013 HC RX MED GY IP 250 OP 250 PS 637: Performed by: INTERNAL MEDICINE

## 2021-01-04 PROCEDURE — 92610 EVALUATE SWALLOWING FUNCTION: CPT | Mod: GN | Performed by: SPEECH-LANGUAGE PATHOLOGIST

## 2021-01-04 PROCEDURE — 80053 COMPREHEN METABOLIC PANEL: CPT | Performed by: HOSPITALIST

## 2021-01-04 PROCEDURE — 250N000011 HC RX IP 250 OP 636: Performed by: INTERNAL MEDICINE

## 2021-01-04 PROCEDURE — 250N000013 HC RX MED GY IP 250 OP 250 PS 637: Performed by: NURSE PRACTITIONER

## 2021-01-04 PROCEDURE — 85027 COMPLETE CBC AUTOMATED: CPT | Performed by: HOSPITALIST

## 2021-01-04 PROCEDURE — 92526 ORAL FUNCTION THERAPY: CPT | Mod: GN | Performed by: SPEECH-LANGUAGE PATHOLOGIST

## 2021-01-04 PROCEDURE — 250N000011 HC RX IP 250 OP 636: Performed by: STUDENT IN AN ORGANIZED HEALTH CARE EDUCATION/TRAINING PROGRAM

## 2021-01-04 PROCEDURE — 250N000013 HC RX MED GY IP 250 OP 250 PS 637: Performed by: STUDENT IN AN ORGANIZED HEALTH CARE EDUCATION/TRAINING PROGRAM

## 2021-01-04 PROCEDURE — 250N000012 HC RX MED GY IP 250 OP 636 PS 637: Performed by: INTERNAL MEDICINE

## 2021-01-04 PROCEDURE — 84100 ASSAY OF PHOSPHORUS: CPT | Performed by: HOSPITALIST

## 2021-01-04 PROCEDURE — 999N001017 HC STATISTIC GLUCOSE BY METER IP

## 2021-01-04 PROCEDURE — 36415 COLL VENOUS BLD VENIPUNCTURE: CPT | Performed by: HOSPITALIST

## 2021-01-04 PROCEDURE — 83735 ASSAY OF MAGNESIUM: CPT | Performed by: HOSPITALIST

## 2021-01-04 PROCEDURE — 120N000001 HC R&B MED SURG/OB

## 2021-01-04 PROCEDURE — 99233 SBSQ HOSP IP/OBS HIGH 50: CPT | Performed by: NURSE PRACTITIONER

## 2021-01-04 PROCEDURE — 99233 SBSQ HOSP IP/OBS HIGH 50: CPT | Mod: GC | Performed by: PSYCHIATRY & NEUROLOGY

## 2021-01-04 RX ORDER — LEVETIRACETAM 500 MG/1
1000 TABLET ORAL 2 TIMES DAILY
Status: DISCONTINUED | OUTPATIENT
Start: 2021-01-04 | End: 2021-01-04

## 2021-01-04 RX ORDER — LEVETIRACETAM 500 MG/1
1000 TABLET ORAL 2 TIMES DAILY
Status: DISCONTINUED | OUTPATIENT
Start: 2021-01-04 | End: 2021-01-07 | Stop reason: HOSPADM

## 2021-01-04 RX ORDER — POTASSIUM CHLORIDE 1.5 G/1.58G
20 POWDER, FOR SOLUTION ORAL ONCE
Status: COMPLETED | OUTPATIENT
Start: 2021-01-04 | End: 2021-01-04

## 2021-01-04 RX ADMIN — PANTOPRAZOLE SODIUM 40 MG: 40 TABLET, DELAYED RELEASE ORAL at 07:55

## 2021-01-04 RX ADMIN — HEPARIN SODIUM 5000 UNITS: 5000 INJECTION, SOLUTION INTRAVENOUS; SUBCUTANEOUS at 13:01

## 2021-01-04 RX ADMIN — INSULIN GLARGINE 10 UNITS: 100 INJECTION, SOLUTION SUBCUTANEOUS at 07:55

## 2021-01-04 RX ADMIN — CEFTRIAXONE SODIUM 2 G: 2 INJECTION, POWDER, FOR SOLUTION INTRAMUSCULAR; INTRAVENOUS at 22:01

## 2021-01-04 RX ADMIN — POTASSIUM CHLORIDE 20 MEQ: 1.5 POWDER, FOR SOLUTION ORAL at 07:55

## 2021-01-04 RX ADMIN — INSULIN ASPART 1 UNITS: 100 INJECTION, SOLUTION INTRAVENOUS; SUBCUTANEOUS at 07:53

## 2021-01-04 RX ADMIN — Medication 2 PACKET: at 04:08

## 2021-01-04 RX ADMIN — Medication 2 PACKET: at 10:14

## 2021-01-04 RX ADMIN — HEPARIN SODIUM 5000 UNITS: 5000 INJECTION, SOLUTION INTRAVENOUS; SUBCUTANEOUS at 20:05

## 2021-01-04 RX ADMIN — LEVETIRACETAM 1000 MG: 10 INJECTION INTRAVENOUS at 07:55

## 2021-01-04 RX ADMIN — CHLORHEXIDINE GLUCONATE 0.12% ORAL RINSE 15 ML: 1.2 LIQUID ORAL at 07:55

## 2021-01-04 RX ADMIN — LEVETIRACETAM 1000 MG: 500 TABLET, FILM COATED ORAL at 20:05

## 2021-01-04 RX ADMIN — DEXAMETHASONE SODIUM PHOSPHATE 10 MG: 4 INJECTION, SOLUTION INTRAMUSCULAR; INTRAVENOUS at 04:08

## 2021-01-04 RX ADMIN — HYDROMORPHONE HYDROCHLORIDE 0.5 MG: 1 INJECTION, SOLUTION INTRAMUSCULAR; INTRAVENOUS; SUBCUTANEOUS at 08:03

## 2021-01-04 RX ADMIN — HEPARIN SODIUM 5000 UNITS: 5000 INJECTION, SOLUTION INTRAVENOUS; SUBCUTANEOUS at 04:08

## 2021-01-04 RX ADMIN — MULTIVITAMIN 15 ML: LIQUID ORAL at 07:55

## 2021-01-04 RX ADMIN — CEFTRIAXONE SODIUM 2 G: 2 INJECTION, POWDER, FOR SOLUTION INTRAMUSCULAR; INTRAVENOUS at 10:14

## 2021-01-04 ASSESSMENT — ACTIVITIES OF DAILY LIVING (ADL)
ADLS_ACUITY_SCORE: 25
ADLS_ACUITY_SCORE: 25
ADLS_ACUITY_SCORE: 23
ADLS_ACUITY_SCORE: 23
ADLS_ACUITY_SCORE: 27
ADLS_ACUITY_SCORE: 25

## 2021-01-04 ASSESSMENT — MIFFLIN-ST. JEOR: SCORE: 1998.88

## 2021-01-04 ASSESSMENT — VISUAL ACUITY: OU: NORMAL ACUITY

## 2021-01-04 NOTE — PROGRESS NOTES
[facility]    Medicine Progress Note - Hospitalist Service       Date of Admission:  12/30/2020  Assessment & Plan       Stevie Dotson is a 65 year old male admitted on 12/30/2020, found to have bacterial meningitis, possible seizures. Unremarkable PMH.     Hospital Summary: Initially admitted with sepsis secondary to bacterial meningitis, acute infectious encephalopathy.  Later on the day of admission, while undergoing EEG monitoring the patient lost her airway protective reflexes requiring emergent intubation.  On 1/3, the patient went for MRI demonstrating possible purulent material versus less likely hemorrhage. Pt able to be successfully extubated 1/3.  Infectious diseases following for antibiotic recommendations, patient was transferred to hospitalist service on 1/4 for transfer out of the ICU.    Sepsis 2/2 strep pneumo meningitis, improving  Possible seizures, resolved  Encephalopathy/encephalitis secondary to above, improving  CSF cultures grew GPC in pairs and chains along with positive blood cultures for strep pneumo -covered by ceftriaxone twice daily.  Has been on vEEG x 5 days, findings consistent to mod-severe encephalopathy with cortical dysfunction in the right hemisphere, this was significantly improved with sedation stopped, no electroclinical seizures or clear epileptiform discharges during the 5-day monitoring period. Mentation now improving, on 1/4 patient is disoriented to situation. Followed by neurology, plan to continue Keppra, discontinue fosphenytoin.  --Every 2 hours neuros, per neuro   --Dexamethasone 10 mg every 6 x 4 days (completed 1/4)  --monitor for seizure activity, discontinue vEEG per neuro   --profound deconditioning, consult PT/OT 1/4    Acute respiratory failure 2/2 strep pneumo meningitis, improving  Required mechanical ventilation from 12/30-1/3.  No significant respiratory complaints or concerns.  Oxygen saturation 99% on 4 L nasal cannula,  weaned down to 2L.  --Encourage aggressive pulmonary hygiene  --Wean O2, goal oxygen saturation greater than 90%    Elevated blood pressure  No formal diagnosis of hypertension, has been intermittently hypertensive while intubated.  BP since extubation 120s/80s - 140s/80s.  --Monitor for today, may need initiation of antihypertensive if persistent     Hypoalbuminemia 2/2 critical illness  Started on postpyloric tube feeds on 1/2, has been tolerating well. Electrolytes stable.  --Continue per nutrition recommendations  --Attempt SLP eval 1/5    Hyperglycemia  No formal history or diagnosis of diabetes mellitus, type II, Hgb A1c 6.2%. Requiring low amounts of sliding scale insulin, covered with 10u Lantus every AM. Hyperglycemia is likely related to steroid use.   --Decrease Lantus 7u every AM x 2, then will likely stop lantus now that dexamethasone completed   --Continue sliding scale for now    Normocytic Anemia  Hemoglobin 13.1, MCV 90.  No abnormal bleeding  --monitor       Diet: NPO for Medical/Clinical Reasons Except for: No Exceptions  Adult Formula Drip Feeding: Continuous Peptamen Intense VHP; Orogastric tube; Goal Rate: 20; mL/hr; Medication - Feeding Tube Flush Frequency: At least 15-30 mL water before and after medication administration and with tube clogging; Amount to Sen...    DVT Prophylaxis: Heparin SQ  Craig Catheter: in place, indication: Strict 1-2 Hour I&O  Code Status: Full Code           Disposition Plan   Expected discharge: 2 - 3 days, recommended to pending therapy evals once adequate pain management/ tolerating PO medications, mental status at baseline and safe disposition plan/ TCU bed available.  Entered: MANUELITO Villegas CNP 01/04/2021, 8:47 AM       The patient's care was discussed with the Attending Physician, Dr. Jerel Stewart, Bedside Nurse, Patient and Patient's Family.    MANUELITO Villegas CNP  Hospitalist Service  [facility]  Contact information  "available via [facility] Paging/Directory    ______________________________________________________________________    Interval History   Feels exhausted due to difficulty sleeping. Denies any acute pain, but notes he feels severely weak/deconditioned. Notes his breathing feels \"iffy\" but cannot articulate why, endorses dry cough, but no respiratory distress. Denies nausea/vomiting.    Data reviewed today: I reviewed all medications, new labs and imaging results over the last 24 hours. I personally reviewed no images or EKG's today.    Physical Exam   Vital Signs: Temp: 99.3  F (37.4  C) Temp src: Bladder BP: (!) 148/88 Pulse: 57   Resp: 19 SpO2: 98 % O2 Device: Nasal cannula Oxygen Delivery: 4 LPM  Weight: 255 lbs 11.74 oz      Physical Exam  Constitutional:       General: He is sleeping. He is not in acute distress.     Appearance: Normal appearance. He is not ill-appearing or toxic-appearing.      Interventions: Nasal cannula in place.   Eyes:      Extraocular Movements: Extraocular movements intact.      Pupils: Pupils are equal, round, and reactive to light.   Cardiovascular:      Rate and Rhythm: Normal rate.      Pulses: No decreased pulses.           Popliteal pulses are 2+ on the right side and 2+ on the left side.        Dorsalis pedis pulses are 2+ on the right side and 2+ on the left side.      Heart sounds: Normal heart sounds. No murmur.   Pulmonary:      Effort: Pulmonary effort is normal. No tachypnea, accessory muscle usage, prolonged expiration or respiratory distress.      Breath sounds: Normal breath sounds.   Abdominal:      General: Abdomen is flat.      Palpations: Abdomen is soft.      Tenderness: There is no abdominal tenderness.   Musculoskeletal:      Right lower le+ Pitting Edema present.      Left lower le+ Pitting Edema present.      Comments: Profoundly weak    Skin:     General: Skin is warm and dry.   Neurological:      Mental Status: He is easily aroused. He is disoriented.      " GCS: GCS eye subscore is 3. GCS verbal subscore is 5. GCS motor subscore is 6.      Cranial Nerves: Cranial nerves are intact. No cranial nerve deficit or dysarthria.      Sensory: Sensation is intact.      Motor: Weakness present.   Psychiatric:         Attention and Perception: Attention normal.         Mood and Affect: Mood normal.         Behavior: Behavior is cooperative.      Comments: Intermittent anxiety        Data   Recent Labs   Lab 01/04/21  0556 01/03/21  0623 01/02/21  0532 12/30/20  0830 12/30/20  0830   WBC 23.9* 19.8* 19.2*   < > 28.8*   HGB 13.1* 12.9* 12.9*   < > 15.7   MCV 90 90 90   < > 93    340 264   < > 329    144 145*   < > 138   POTASSIUM 3.6 4.0 4.0   < > 3.8   CHLORIDE 111* 113* 112*   < > 104   CO2 28 29 30   < > 29   BUN 38* 39* 29   < > 17   CR 0.78 0.83 1.05   < > 0.89   ANIONGAP 4 2* 3   < > 5   HERNESTO 8.0* 8.2* 7.9*   < > 9.2   * 175* 145*   < > 224*   ALBUMIN 2.0* 1.9* 1.9*   < > 3.3*   PROTTOTAL 6.3* 6.4* 6.3*   < > 8.3   BILITOTAL 0.8 0.7 0.5   < > 1.8*   ALKPHOS 52 53 53   < > 78   ALT 52 53 64   < > 70   AST 33 25 48*   < > 38   TROPI  --   --   --   --  <0.015    < > = values in this interval not displayed.     Recent Results (from the past 24 hour(s))   MRA Brain ([facility]) wo Contrast    Narrative    MR ANGIOGRAM OF THE HEAD WITHOUT CONTRAST   1/3/2021 1:08 PM     COMPARISON: [facility] MRA 12/30/2020    HISTORY: Questionable stroke on right, meningitis.    TECHNIQUE:  3D time-of-flight MR angiogram of the head without  contrast. MIP reconstruction of all MR angiographic data was  performed.      Impression    IMPRESSION:  Study is limited by patient motion. Evaluation of the  vessels at the base of the brain is limited due to motion. The  bilateral distal cervical internal carotid, bilateral distal vertebral  and basilar arteries appear within normal limits. Flow is identified  in the bilateral anterior cerebral and bilateral middle  cerebral  artery branches but the main trunks of both ACAs and both MCAs are  secured by motion.    SEEMA LEPE MD   MR Brain w/o & w Contrast    Narrative    MRI OF THE BRAIN WITHOUT AND WITH CONTRAST 1/3/2021 1:17 PM     COMPARISON: Brain MR 12/30/2020    HISTORY: Bacterial meningitis, asymmetric EEG, question of stroke on  right.      TECHNIQUE: Multi-sequence, multi-planar MRI images of the brain were  acquired before and after the administration of IV gadolinium (12 mL  Gadavist).      Impression    IMPRESSION:   1. There has been interval development of abnormal signal and  restricted diffusion in the occipital horns of the lateral ventricles  bilaterally as well as scattered foci of restricted diffusion in  multiple sulci along the right cerebral convexity and along the high  posterior parietal convexities bilaterally worrisome for purulent  material or, less likely, hemorrhage. Lumbar puncture may be helpful  for further evaluation for infection.  2. There is no hydrocephalus. There is no midline shift. There is no  evidence for recent ischemic infarct.  3. No sinusitis or mastoiditis.    SEEMA LEPE MD

## 2021-01-04 NOTE — PROGRESS NOTES
CLINICAL NUTRITION SERVICES - REASSESSMENT NOTE      Recommendations Ordered by Registered Dietitian (RD): Increase to goal TF of Peptamen Intense VHP @ 70 ml/hr to provide 1680 kcals (15 kcal/kg), 155 grams pro (1.8 g.kg), 128 g cho, 7 grams fiber, and 1411 ml free water.   Discontinue prosource and certavite as no longer needed   Malnutrition: % Weight Loss:  None noted  % Intake:  </= 50% for >/= 5 days (severe malnutrition)  Subcutaneous Fat Loss:  Unable but none suspected  Muscle Loss:  Unable but none suspected  Fluid Retention:  None noted    Malnutrition Diagnosis: Unable to determine but none suspected       EVALUATION OF PROGRESS TOWARD GOALS   Diet:  NPO with plans for speech eval 1/5    Nutrition Support:  TF initiation delayed until 1/2 due to high OG output. Once postpyloric tube placed, patient started trickle TF 1/2 and achieved goal on 1/3 and continues as follows.    Peptamen intense VHP @ 20 ml/hr providing 480 kcals, 44 gm pro , 403 mL H20, 36 gm CHO, 2 gm fiber   Prosource 8 packets per day (2 packets every 6 hrs) = 320 kcals, 88 gm pro   Total 800 kcals (7 kcal/kg), 132 g pro (1.6 g/kg)  Flushes 150 ml Q4H per MD    Intake/Tolerance:    K and phos - normal  Mag 2.5 - high but acceptable  BGMs <180 continues on medium sliding scale insulin and lantus 10 units in am  BM x2 1/4 & x3 1/3  Intake/output 1560/3300   Wt - 116 kg (up slightly from admit)      ASSESSED NUTRITION NEEDS:  Dosing Weight:  113 kg (energy), 83.6 kg (protein)   Estimated Energy Needs:  7290-8386 kcals (14-17 Kcal/Kg)  Justification: obese  Estimated Protein Needs:  125-150 grams protein (1.5-1.8 g pro/Kg)  Justification: hypercatabolism with critical illness and obesity guidelines   Estimated Fluid Needs:  6600-6963 mL (1 mL/Kcal)  Justification: maintenance    NEW FINDINGS:   1/2:  AXR = Tip in distal duodenum (ND)  1/3:  Extubated 1335  Propofol off 1/2.     Previous Goals:   TF + Prosource + Propofol will meet %  estimated needs in 48-72 hrs  Evaluation: Not met    Previous Nutrition Diagnosis:   Inadequate protein-energy intake related to intubation as evidenced by NPO status, Propofol meeting 57% energy and 0% protein needs and TF planned 1/1.  Evaluation: Improving      MALNUTRITION  % Weight Loss:  None noted  % Intake:  </= 50% for >/= 5 days (severe malnutrition)  Subcutaneous Fat Loss:  Unable but none suspected  Muscle Loss:  Unable but none suspected  Fluid Retention:  None noted    Malnutrition Diagnosis: Unable to determine but none suspected    CURRENT NUTRITION DIAGNOSIS  Inadequate energy intake related to TF continues at 20 ml/hr + Prosource and Propofol now off as evidenced by meeting 50% of his energy needs.    INTERVENTIONS  Recommendations / Nutrition Prescription  Increase to goal TF of Peptamen Intense VHP @ 70 ml/hr to provide 1680 kcals (15 kcal/kg), 155 grams pro (1.8 g.kg), 128 g cho, 7 grams fiber, and 1411 ml free water.   Discontinue prosource and certavite as no longer needed    Implementation  EN Composition, Medical Food Supplement, Multivitamin/Mineral - orders written to increase tube feeding to 30 ml/hr and increase every 8 hours by 20 ml to goal. Orders written to discontinue prosource and certavite as above.  Collaboration and Referral of Nutrition care- patient discussed today during interdisciplinary bedside rounds    Goals  TF to meet % of needs while NPO    MONITORING AND EVALUATION:  Progress towards goals will be monitored and evaluated per protocol and Practice Guidelines    Helga Mansfield  Dietetic Intern

## 2021-01-04 NOTE — PROGRESS NOTES
[facility]    Infectious Disease Progress Note    Date of Service (when I saw the patient): 01/04/2021     Assessment & Plan   Stevie Dotson is a 65 year old male who was admitted on 12/30/2020.     Impression:  1. 65 y.o male with admitted with confusion and fever.   2. Progressive encephalopathy in setting of MRI results suggesting recent seizure   3. LP done suggestive of meningitis.    4. Started on broad spectrum antimicrobials and steroids   5. Intubated due to progressive encephalopathy.   6. CSF cultures now coming back with GPC In pairs and chains which along with positive blood cultures for step pneumo explains the presentation.        Recommendations:   Continue on Ceftriaxone BID  Follow up blood cultures continue to be negative  Done with steroids   Leucocytosis on steroids.     Ria Palma MD    Interval History   Cultures as listed   Extubated   Fever better      Physical Exam   Temp: 99.1  F (37.3  C) Temp src: Bladder BP: (!) 140/84 Pulse: 57   Resp: 15 SpO2: 98 % O2 Device: Nasal cannula Oxygen Delivery: 2 LPM  Vitals:    01/02/21 0600 01/03/21 0600 01/04/21 0600   Weight: 115.8 kg (255 lb 4.7 oz) 115.7 kg (255 lb 1.2 oz) 116 kg (255 lb 11.7 oz)     Vital Signs with Ranges  Temp:  [98.6  F (37  C)-99.9  F (37.7  C)] 99.1  F (37.3  C)  Pulse:  [] 57  Resp:  [11-28] 15  BP: (128-192)/() 140/84  SpO2:  [92 %-98 %] 98 %    Constitutional: awake, some what disoriented   Lungs: Clear to auscultation bilaterally, no crackles or wheezing  Cardiovascular: Regular rate and rhythm, normal S1 and S2, and no murmur noted  Abdomen: Normal bowel sounds, soft, non-distended, non-tender  Skin: No rashes, no cyanosis, no edema  Other:    Medications     propofol (DIPRIVAN) infusion Stopped (01/02/21 0800)     sodium chloride 10 mL/hr at 01/03/21 0000       sodium chloride 0.9%  500 mL Intravenous Once     sodium chloride 0.9 %  100 mL Intravenous Once     cefTRIAXone  2 g  Intravenous Q12H     chlorhexidine  15 mL Mouth/Throat Q12H     [Held by provider] fosphenytoin (CEREBYX) IV (loading dose)  2 mg PE/kg (Adjusted) Intravenous Q8H     heparin ANTICOAGULANT  5,000 Units Subcutaneous Q8H     insulin aspart  1-6 Units Subcutaneous Q4H     insulin glargine  10 Units Subcutaneous QAM AC     iopamidol  120 mL Intravenous Once     levETIRAcetam  1,000 mg Intravenous Q12H     melatonin  10 mg Oral At Bedtime     multivitamins w/minerals  15 mL Per Feeding Tube Daily     pantoprazole  40 mg Oral Daily     protein modular  2 packet Per Feeding Tube Q6H     sodium chloride (PF)  3 mL Intracatheter Q8H       Data   All microbiology laboratory data reviewed.  Recent Labs   Lab Test 01/04/21  0556 01/03/21  0623 01/02/21  0532   WBC 23.9* 19.8* 19.2*   HGB 13.1* 12.9* 12.9*   HCT 39.3* 39.3* 39.4*   MCV 90 90 90    340 264     Recent Labs   Lab Test 01/04/21  0556 01/03/21  0623 01/02/21  0532   CR 0.78 0.83 1.05     No lab results found.  Recent Labs   Lab Test 12/30/20  2302 12/30/20  1033 12/30/20  0953 12/30/20  0937 12/30/20  0830   CULT No growth after 4 days No growth after 5 days Moderate growth  Streptococcus pneumoniae  *  Critical Value/Significant Value, preliminary result only, called to and read back by  Janis Zafar RN at 4525 12.31.20.DK   No growth Cultured on the 1st day of incubation:  Streptococcus pneumoniae  *  Critical Value/Significant Value, preliminary result only, called to and read back by  Abdullahi Brooks RN. @2329. 12.30.20. BS.     (Note)  POSITIVE for STREPTOCOCCUS PNEUMONIAE by [employer] multiplex nucleic  acid test. Note: Streptococcus mitis group is known to cross react  with S. pneumoniae. Correlation with culture results and clinical  presentation is necessary. Final identification and antimicrobial  susceptibility testing will be verified by standard methods.    Specimen tested with Verigene multiplex, gram-positive blood culture  nucleic acid test  for the following targets: Staph aureus, Staph  epidermidis, Staph lugdunensis, other Staph species, Enterococcus  faecalis, Enterococcus faecium, Streptococcus species, S. agalactiae,  S. anginosus grp., S. pneumoniae, S. pyogenes, Listeria sp., mecA  (methicillin resistance) and Robert/B (vancomycin resistance).    Critical Value/Significant Value called to and read back by Abdullahi Brooks at 0205 on 12.31.20 by .         Attestation:  Total time on the floor involved in the patient's care: 35 minutes. Total time spent in counseling/care coordination: >50%

## 2021-01-04 NOTE — PROGRESS NOTES
VIDEO EEG DAY 5     PATIENT INFORMATION: Stevie Dotson is a 65 year old male presented with meningitis with altered mental status. EEG being done to evaluate for seizures.        MEDICATIONS: Propofol, levetiracetam and fosphenytoin. These medications and doses were derived from the medical record at the time of this procedure.     TECHNICAL SUMMARY: EEG was recorded from 22 scalp electrodes placed according to the 10-20 international system. Additional electrodes were used for referencing, EKG, and to record from other cerebral regions as appropriate. Video was continuously recorded and was reviewed for clinical correlation. Electrodes were attached and both video and EEG were monitored and annotated by qualified EEG technologists. Video-EEG was reviewed and report generated by qualified physician.     BACKGROUND ACTIVITY:  EEG background consisted of generalized delta theta slowing with maximum slowing noted in the right hemisphere.  At times the delta slowing is rhythmic with superimposed sharply contoured discharges.  Persistent asymmetry with lower amplitude electrocerebral potentials are noted in the right hemisphere compared to the left side.  The left hemisphere has variable frequencies up to 14 Hz and superimposed burst of delta slowing 1 to 3 Hz in frequency.  Well-formed parieto-occipital rhythm is noted when he is maximally stimulated up to 6 Hz.   Well-formed sleep architecture is not noted.      ACTIVATION PROCEDURE: EEG is reactive to stimulation    INTERICTAL EPILEPTIFORM DISCHARGES: None     ICTAL: No clinical events or electrographic seizures were recorded. Video was reviewed intermittently by EEG technologist and physician for clinical seizures.      IMPRESSION OF VIDEO EEG DAY # 5: This video electroencephalogram is abnormal due to the presences of continuous generalized slowing with maximum slowing in the right hemisphere.  The superimposed sharply contoured delta activity that does become  rhythmic at times is thought to be part of the encephalopathic process.  These findings on Video EEG are consistent with a moderate encephalopathy with maximum cortical dysfunction in the right hemisphere.  No clear epileptiform discharges or electrographic seizures were seen in this record Clinical correlation is advised.    SUMMARY OF 5 DAYS OF VIDEO EEG: During 5 days of video EEG monitoring patient had a continuous generalized theta and delta slowing with maximum slowing noted in the right hemisphere.  These findings are consistent with a moderate to severe encephalopathy with cortical dysfunction in the right hemisphere.  When propofol was stopped EEG no longer had attenuation and there was an increase in theta and alpha electrocerebral activity.  Throughout the file there are burst of sharply contoured rhythmic delta activity which is thought to be part of the encephalopathic process and not electrographic seizures.  No electroclinical seizures or clear epileptiform discharges were noted during 5 days of recording.    Najma Del Valle MD

## 2021-01-04 NOTE — PLAN OF CARE
Improving due to ability to be extubated to NC. Neuro intact except generalized weakness and some confusion about situation. Remains SR.

## 2021-01-04 NOTE — PLAN OF CARE
No changes overnight.     N: Patient opens eyes spontaneously and is calm and cooperative. Disoriented to situation and place. Follows commands and ASH. PEERL.   CV: Sinus bradycardia. SBP stable.  LS: Doing well on 4L nasal cannula. Lungs sound clear  GI/:Adequate urine output through ervin. TF with 150 FWF Q4. Flexiseal in place   IV: TKO through PIV.

## 2021-01-05 ENCOUNTER — APPOINTMENT (OUTPATIENT)
Dept: OCCUPATIONAL THERAPY | Facility: CLINIC | Age: 66
DRG: 871 | End: 2021-01-05
Attending: NURSE PRACTITIONER
Payer: COMMERCIAL

## 2021-01-05 ENCOUNTER — APPOINTMENT (OUTPATIENT)
Dept: PHYSICAL THERAPY | Facility: CLINIC | Age: 66
DRG: 871 | End: 2021-01-05
Attending: NURSE PRACTITIONER
Payer: COMMERCIAL

## 2021-01-05 ENCOUNTER — APPOINTMENT (OUTPATIENT)
Dept: SPEECH THERAPY | Facility: CLINIC | Age: 66
DRG: 871 | End: 2021-01-05
Attending: NURSE PRACTITIONER
Payer: COMMERCIAL

## 2021-01-05 LAB
ANION GAP SERPL CALCULATED.3IONS-SCNC: 4 MMOL/L (ref 3–14)
BACTERIA SPEC CULT: NO GROWTH
BUN SERPL-MCNC: 30 MG/DL (ref 7–30)
CALCIUM SERPL-MCNC: 8.1 MG/DL (ref 8.5–10.1)
CHLORIDE SERPL-SCNC: 107 MMOL/L (ref 94–109)
CO2 SERPL-SCNC: 27 MMOL/L (ref 20–32)
CREAT SERPL-MCNC: 0.81 MG/DL (ref 0.66–1.25)
ERYTHROCYTE [DISTWIDTH] IN BLOOD BY AUTOMATED COUNT: 13.7 % (ref 10–15)
GFR SERPL CREATININE-BSD FRML MDRD: >90 ML/MIN/{1.73_M2}
GLUCOSE BLDC GLUCOMTR-MCNC: 111 MG/DL (ref 70–99)
GLUCOSE BLDC GLUCOMTR-MCNC: 114 MG/DL (ref 70–99)
GLUCOSE BLDC GLUCOMTR-MCNC: 117 MG/DL (ref 70–99)
GLUCOSE BLDC GLUCOMTR-MCNC: 117 MG/DL (ref 70–99)
GLUCOSE BLDC GLUCOMTR-MCNC: 136 MG/DL (ref 70–99)
GLUCOSE BLDC GLUCOMTR-MCNC: 180 MG/DL (ref 70–99)
GLUCOSE SERPL-MCNC: 79 MG/DL (ref 70–99)
HCT VFR BLD AUTO: 41.4 % (ref 40–53)
HGB BLD-MCNC: 13.5 G/DL (ref 13.3–17.7)
LACTATE BLD-SCNC: 1.5 MMOL/L (ref 0.7–2)
MAGNESIUM SERPL-MCNC: 2.3 MG/DL (ref 1.6–2.3)
MCH RBC QN AUTO: 29.1 PG (ref 26.5–33)
MCHC RBC AUTO-ENTMCNC: 32.6 G/DL (ref 31.5–36.5)
MCV RBC AUTO: 89 FL (ref 78–100)
PHENYTOIN FREE SERPL-MCNC: 5.22 UG/ML (ref 1–2)
PHOSPHATE SERPL-MCNC: 3.3 MG/DL (ref 2.5–4.5)
PLATELET # BLD AUTO: 404 10E9/L (ref 150–450)
POTASSIUM SERPL-SCNC: 3.6 MMOL/L (ref 3.4–5.3)
RBC # BLD AUTO: 4.64 10E12/L (ref 4.4–5.9)
SODIUM SERPL-SCNC: 138 MMOL/L (ref 133–144)
SPECIMEN SOURCE: NORMAL
WBC # BLD AUTO: 22.8 10E9/L (ref 4–11)

## 2021-01-05 PROCEDURE — 250N000011 HC RX IP 250 OP 636: Performed by: NURSE PRACTITIONER

## 2021-01-05 PROCEDURE — 250N000013 HC RX MED GY IP 250 OP 250 PS 637: Performed by: STUDENT IN AN ORGANIZED HEALTH CARE EDUCATION/TRAINING PROGRAM

## 2021-01-05 PROCEDURE — 97110 THERAPEUTIC EXERCISES: CPT | Mod: GP

## 2021-01-05 PROCEDURE — 36415 COLL VENOUS BLD VENIPUNCTURE: CPT | Performed by: INTERNAL MEDICINE

## 2021-01-05 PROCEDURE — 97110 THERAPEUTIC EXERCISES: CPT | Mod: GO

## 2021-01-05 PROCEDURE — 83735 ASSAY OF MAGNESIUM: CPT | Performed by: INTERNAL MEDICINE

## 2021-01-05 PROCEDURE — 84132 ASSAY OF SERUM POTASSIUM: CPT | Performed by: HOSPITALIST

## 2021-01-05 PROCEDURE — 250N000011 HC RX IP 250 OP 636: Performed by: INTERNAL MEDICINE

## 2021-01-05 PROCEDURE — 250N000013 HC RX MED GY IP 250 OP 250 PS 637: Performed by: NURSE PRACTITIONER

## 2021-01-05 PROCEDURE — 999N001017 HC STATISTIC GLUCOSE BY METER IP

## 2021-01-05 PROCEDURE — 97166 OT EVAL MOD COMPLEX 45 MIN: CPT | Mod: GO

## 2021-01-05 PROCEDURE — 120N000001 HC R&B MED SURG/OB

## 2021-01-05 PROCEDURE — 84100 ASSAY OF PHOSPHORUS: CPT | Performed by: INTERNAL MEDICINE

## 2021-01-05 PROCEDURE — 80048 BASIC METABOLIC PNL TOTAL CA: CPT | Performed by: INTERNAL MEDICINE

## 2021-01-05 PROCEDURE — 85027 COMPLETE CBC AUTOMATED: CPT | Performed by: HOSPITALIST

## 2021-01-05 PROCEDURE — 92526 ORAL FUNCTION THERAPY: CPT | Mod: GN | Performed by: SPEECH-LANGUAGE PATHOLOGIST

## 2021-01-05 PROCEDURE — 97530 THERAPEUTIC ACTIVITIES: CPT | Mod: GO

## 2021-01-05 PROCEDURE — 97161 PT EVAL LOW COMPLEX 20 MIN: CPT | Mod: GP

## 2021-01-05 PROCEDURE — 97530 THERAPEUTIC ACTIVITIES: CPT | Mod: GP

## 2021-01-05 PROCEDURE — 83605 ASSAY OF LACTIC ACID: CPT | Performed by: INTERNAL MEDICINE

## 2021-01-05 PROCEDURE — 99233 SBSQ HOSP IP/OBS HIGH 50: CPT | Performed by: INTERNAL MEDICINE

## 2021-01-05 PROCEDURE — 36415 COLL VENOUS BLD VENIPUNCTURE: CPT | Performed by: HOSPITALIST

## 2021-01-05 RX ORDER — ACETAMINOPHEN 325 MG/1
650 TABLET ORAL EVERY 4 HOURS PRN
Status: DISCONTINUED | OUTPATIENT
Start: 2021-01-05 | End: 2021-01-07 | Stop reason: HOSPADM

## 2021-01-05 RX ADMIN — ACETAMINOPHEN 650 MG: 325 TABLET, FILM COATED ORAL at 21:36

## 2021-01-05 RX ADMIN — INSULIN ASPART 1 UNITS: 100 INJECTION, SOLUTION INTRAVENOUS; SUBCUTANEOUS at 20:35

## 2021-01-05 RX ADMIN — HEPARIN SODIUM 5000 UNITS: 5000 INJECTION, SOLUTION INTRAVENOUS; SUBCUTANEOUS at 20:35

## 2021-01-05 RX ADMIN — LEVETIRACETAM 1000 MG: 500 TABLET, FILM COATED ORAL at 20:35

## 2021-01-05 RX ADMIN — CEFTRIAXONE SODIUM 2 G: 2 INJECTION, POWDER, FOR SOLUTION INTRAMUSCULAR; INTRAVENOUS at 21:36

## 2021-01-05 RX ADMIN — LEVETIRACETAM 1000 MG: 500 TABLET, FILM COATED ORAL at 09:01

## 2021-01-05 RX ADMIN — PANTOPRAZOLE SODIUM 40 MG: 40 TABLET, DELAYED RELEASE ORAL at 06:47

## 2021-01-05 RX ADMIN — HEPARIN SODIUM 5000 UNITS: 5000 INJECTION, SOLUTION INTRAVENOUS; SUBCUTANEOUS at 12:56

## 2021-01-05 RX ADMIN — HEPARIN SODIUM 5000 UNITS: 5000 INJECTION, SOLUTION INTRAVENOUS; SUBCUTANEOUS at 04:26

## 2021-01-05 RX ADMIN — ACETAMINOPHEN 650 MG: 325 TABLET, FILM COATED ORAL at 05:03

## 2021-01-05 RX ADMIN — CEFTRIAXONE SODIUM 2 G: 2 INJECTION, POWDER, FOR SOLUTION INTRAMUSCULAR; INTRAVENOUS at 09:01

## 2021-01-05 ASSESSMENT — ACTIVITIES OF DAILY LIVING (ADL)
ADLS_ACUITY_SCORE: 23
ADLS_ACUITY_SCORE: 23
ADLS_ACUITY_SCORE: 21
ADLS_ACUITY_SCORE: 21
DEPENDENT_IADLS:: INDEPENDENT
ADLS_ACUITY_SCORE: 23
ADLS_ACUITY_SCORE: 23

## 2021-01-05 ASSESSMENT — MIFFLIN-ST. JEOR: SCORE: 1991.02

## 2021-01-05 NOTE — PLAN OF CARE
Pt here with bacterial meningitis/seizure/encephalopathy. A+Ox4. VSS on RA, ex elevated BP's. Neuros: Voice is hoarse, whispers, LUE 3/5, RUE 2-3/5 (RUE slightly weaker than LUE per pt), and  BLE 2-3/5 motor strength, weak/moderate L hand , weak right hand , weak dorsiplantars and plantarflexion, and finger to nose is slow, but deliberate. Tele: NSR. Bedrest. Regular diet, thin liquids (No straws) with 1:1 supervision. Seizure precautions maintained. Rectal rube patent. Craig patent with adequate output. C/O lower back pain- Gave PRN PO tylenol x1 with heat application for relief. Q2 turn and reposition. Saline locked. Scoring Green on the Aggression Stop Light Tool. Plan to work with therapies today. Discharge pending therapy evals once adequate pain management/ tolerating PO medications, mental status at baseline and safe disposition plan/ TCU bed available.

## 2021-01-05 NOTE — PLAN OF CARE
Date & Time: 1/5 5889-7824  Diagnosis: Bacterial meningitis, encephalitis, seizure   Procedures: Extubated 1/4   Orientation/Cognitive: AOx4, flat, whispers, hoarse voice  VS/O2: VSS ex slight HTN, RA   Mobility: Strong A2 + gb/walker   Diet: Regular   Pain Management: PRNs available   Bowel & Bladder: Craig patent, rectal tube removed @ 1300   Skin: WDL   Abnormal Labs:  & 136, BUN 38, Mg 2.5, WBC 22.8  Tele: SR + BBB   IV Access/Drips/Fluids: R PIV SL   Drains: NA  Tests: NA  Consults: Neuro, ID, hospitalist   Discharge Plan: Pending - will need ARU   Other: Neuros - LUCY 3/4, RUE 2-3/5, BLE 2-3/5, weak R hand , moderate L hand .

## 2021-01-05 NOTE — PROGRESS NOTES
Care Management Initial Consult    General Information  Assessment completed with: Patient, Amada SHAKIRA       Primary Care Provider verified and updated as needed:     Readmission within the last 30 days:        Reason for Consult: discharge planning  Advance Care Planning: Advance Care Planning Reviewed: no concerns identified          Communication Assessment  Patient's communication style: spoken language (English or Bilingual)    Hearing Difficulty or Deaf: no   Wear Glasses or Blind: no    Cognitive  Cognitive/Neuro/Behavioral: .WDL except  Level of Consciousness: alert  Arousal Level: opens eyes spontaneously  Orientation: oriented x 4  Mood/Behavior: calm, cooperative  Best Language: 0 - No aphasia  Speech: hoarse, whispers    Living Environment:   People in home: spouse     Current living Arrangements: house      Able to return to prior arrangements:         Family/Social Support:  Care provided by: self  Provides care for: no one  Marital Status:   Wife          Description of Support System: Supportive, Involved         Current Resources:   Skilled Home Care Services:    Community Resources:    Equipment currently used at home: none  Supplies currently used at home:      Employment/Financial:  Employment Status: retired        Financial Concerns:             Lifestyle & Psychosocial Needs:        Socioeconomic History     Marital status:      Spouse name: Not on file     Number of children: Not on file     Years of education: Not on file     Highest education level: Not on file     Tobacco Use     Smoking status: Former Smoker     Smokeless tobacco: Never Used   Substance and Sexual Activity     Alcohol use: Yes     Comment: whenever i want     Drug use: No     Sexual activity: Yes     Partners: Female       Functional Status:  Prior to admission patient needed assistance:   Dependent ADLs:: Independent  Dependent IADLs:: Independent       Mental Health Status:  Mental Health Status: No  Current Concerns       Chemical Dependency Status:  Chemical Dependency Status: No Current Concerns             Values/Beliefs:  Spiritual, Cultural Beliefs, Nondenominational Practices, Values that affect care: no               Additional Information:  Writer spoke with pt about ARU. Pt is agreeable and would like a referral sent to  ARU. Writer sent referral and called them to notify them of referral.     DIAN Olivera

## 2021-01-05 NOTE — PROGRESS NOTES
"    [facility]    Stroke/Neurocritical Care Progress Note    Interval Events  Extubated yesterday afternoon and feeling much better since then. 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    Impression  1. Strep pneumoniae meningitis and bacteremia, source as of yet unidentified, however most likely pulmonary per ID. Consider spine imaging if source control not achieved.  2. Right hemispheric focal slowing and transient left arm stiffening at presentation worrisome for seizure, but no seizures or epileptiform discharges captured on EEG  3. Obtundation, secondary to above and related to sedative effect, resolved    Plan  Continue ceftri per ID  Continue keppra, okay to convert to 1 g enterally BID, continue at least until outpatient follow up, possibly longer pending clinical course  Okay to space neurochecks to q4 hours and q8 overnight if sleeping    Vascular Neurology/Neurocritical Care will sign off at this time. Please do not hesitate to contact us with questions or concerns, should they arise. Please consult the General Neurology service if there are any different or additional consultation questions after the patient is out of the ICU.    The Stroke/Neurocritical Care Staff is Dr. Holguin.    Danyelle Guardado MD  Vascular Neurology Fellow  To page me or covering stroke neurology team member, click here: [facility]COM   Choose \"On Call\" tab at top, then search dropdown box for \"Neurology Adult\", select location, press Enter, then look for stroke/neuro ICU/telestroke.    ______________________________________________________    Medications   Home Meds  Prior to Admission medications    Medication Sig Start Date End Date Taking? Authorizing Provider   fluticasone (FLONASE) 50 MCG/ACT nasal spray Spray 1 spray into both nostrils 2 times daily   Yes Unknown, Entered By History   omeprazole " (PRILOSEC) 20 MG DR capsule Take 20 mg by mouth daily   Yes Unknown, Entered By History   oxymetazoline (AFRIN) 0.05 % nasal spray Spray 2 sprays into both nostrils 2 times daily   Yes Unknown, Entered By History   sildenafil (VIAGRA) 100 MG tablet Take 1 tablet (100 mg) by mouth daily as needed (ED) 3/8/19  Yes Agusto Holguin MD       Scheduled Meds    sodium chloride 0.9%  500 mL Intravenous Once     sodium chloride 0.9 %  100 mL Intravenous Once     cefTRIAXone  2 g Intravenous Q12H     chlorhexidine  15 mL Mouth/Throat Q12H     heparin ANTICOAGULANT  5,000 Units Subcutaneous Q8H     insulin aspart  1-6 Units Subcutaneous Q4H     insulin glargine  10 Units Subcutaneous QAM AC     iopamidol  120 mL Intravenous Once     levETIRAcetam  1,000 mg Oral or Feeding Tube BID     melatonin  10 mg Oral At Bedtime     pantoprazole  40 mg Oral Daily     sodium chloride (PF)  3 mL Intracatheter Q8H       Infusion Meds    propofol (DIPRIVAN) infusion Stopped (01/02/21 0800)     sodium chloride 10 mL/hr at 01/03/21 0000       PRN Meds  acetaminophen, glucose **OR** dextrose **OR** glucagon, HYDROmorphone, lidocaine 4%, lidocaine (buffered or not buffered), melatonin, midazolam, naloxone **OR** naloxone **OR** naloxone **OR** naloxone, nitroGLYcerin, propofol (DIPRIVAN) infusion **AND** propofol **AND** CK total **AND** Triglycerides, sodium chloride (PF)       PHYSICAL EXAMINATION  Temp:  [99.1  F (37.3  C)-99.9  F (37.7  C)] 99.7  F (37.6  C)  Pulse:  [50-79] 67  Resp:  [12-28] 22  BP: (128-148)/(75-92) 145/88  SpO2:  [93 %-98 %] 96 %     General:  patient lying in bed without any acute distress    HEENT:  normocephalic/atraumatic  Cardio:  RRR  Pulmonary:  no respiratory distress  Abdomen:  soft, non-tender, non-distended  Extremities:  no edema, peripheral pulses palpable  Skin:  intact, warm/dry     Neurologic  Mental Status:  Awake, alert, attentive, oriented to self, time, place, and circumstance. Language is fluent  and coherent with intact comprehension of complex commands, naming, and repetition. No left visuospatial, tactile, or auditory neglect  Cranial Nerves:  VFF, PERRL, EOMI, face symmetric, sensation intact, tongue midline, hoarse but no dysarthria  Motor:  normal muscle tone and bulk, weak antigravity throughout  Reflexes:  brisk throughout  Sensory:  intact in 4/4 extremities  Coordination:  FNF and H2S intact bilaterally  Station/Gait:  not tested     Imaging  I personally reviewed all imaging; relevant findings per HPI.     Lab Results Data   CBC  Recent Labs   Lab 01/04/21  0556 01/03/21  0623 01/02/21  0532   WBC 23.9* 19.8* 19.2*   RBC 4.38* 4.35* 4.39*   HGB 13.1* 12.9* 12.9*   HCT 39.3* 39.3* 39.4*    340 264     Basic Metabolic Panel    Recent Labs   Lab 01/04/21  0556 01/03/21  0623 01/02/21  0532    144 145*   POTASSIUM 3.6 4.0 4.0   CHLORIDE 111* 113* 112*   CO2 28 29 30   BUN 38* 39* 29   CR 0.78 0.83 1.05   * 175* 145*   HERNESTO 8.0* 8.2* 7.9*     Liver Panel  Recent Labs   Lab 01/04/21  0556 01/03/21  0623 01/02/21  0532   PROTTOTAL 6.3* 6.4* 6.3*   ALBUMIN 2.0* 1.9* 1.9*   BILITOTAL 0.8 0.7 0.5   ALKPHOS 52 53 53   AST 33 25 48*   ALT 52 53 64     INR  No lab results found.   Lipid Profile    Recent Labs   Lab Test 03/08/19  0845   CHOL 232*   HDL 41   *   TRIG 138     A1C    Recent Labs   Lab Test 12/30/20  2303   A1C 6.2*     Troponin I    Recent Labs   Lab 12/30/20  0830   TROPI <0.015

## 2021-01-05 NOTE — PROGRESS NOTES
01/05/21 1000   Quick Adds   Type of Visit Initial PT Evaluation   Living Environment   People in home spouse   Current Living Arrangements house   Home Accessibility no concerns   Transportation Anticipated family or friend will provide;car, drives self   Living Environment Comments No steps to enter.    Self-Care   Usual Activity Tolerance good   Current Activity Tolerance fair   Equipment Currently Used at Home none   Activity/Exercise/Self-Care Comment Patient reports he is independent with all moblity at baseline. He chops wood and manages his yard.    Disability/Function   Hearing Difficulty or Deaf no   Wear Glasses or Blind no   Concentrating, Remembering or Making Decisions Difficulty no   Difficulty Communicating no   Difficulty Eating/Swallowing no   Walking or Climbing Stairs Difficulty no   Dressing/Bathing Difficulty no   Fall history within last six months no   General Information   Onset of Illness/Injury or Date of Surgery 12/30/20   Referring Physician Carline Lopez APRN CNP   Patient/Family Therapy Goals Statement (PT) to go home    Pertinent History of Current Problem (include personal factors and/or comorbidities that impact the POC) Patient is 66 YO M admitted with diagnosis of bacterial meningitis complicated by acute respiratory failure requiring intubation 12/30-1/3/2020 and a seizure. PMH: asplenia, GERD    Existing Precautions/Restrictions fall   General Observations Patient in supine, agreeable to PT.    Cognition   Orientation Status (Cognition) oriented x 4   Affect/Mental Status (Cognition) flat/blunted affect   Follows Commands (Cognition) follows one-step commands   Cognitive Status Comments Patient pulling at catheter tube but easily re-directable during session. Delayed responses but accurate throughout. Difficulty with multistep commands.    Pain Assessment   Patient Currently in Pain Yes, see Vital Sign flowsheet  (mild low back pain)   Posture    Posture Protracted  shoulders   Range of Motion (ROM)   ROM Quick Adds ROM WFL   Strength   Manual Muscle Testing Quick Adds Deficits observed during functional mobility   Strength Comments B hip flexion 3-/5; generalized weakness noted   Bed Mobility   Bed Mobility rolling right;supine-sit   Rolling Right [place] (Bed Mobility) minimum assist (75% patient effort)   Supine-Sit [place] (Bed Mobility) minimum assist (75% patient effort);moderate assist (50% patient effort);2 person assist   Transfers   Transfers sit-stand transfer   Transfer Safety Concerns Noted losing balance backward;decreased balance during turns   Sit-Stand Transfer   Sit-Stand [place] (Transfers) minimum assist (75% patient effort);2 person assist   Assistive Device (Sit-Stand Transfers) walker, front-wheeled   Gait/Stairs (Locomotion)   [place] Level (Gait) 2 person assist;minimum assist (75% patient effort)   Assistive Device (Gait) walker, front-wheeled   Distance in Feet (Required for LE Total Joints) 5 steps from bed to chair   Comment (Gait/Stairs) Further gait deferred due to fatigue   Balance   Balance other (describe)   Balance Comments Intermittent min assist for trunk support on edge of bed with leg exercises. Min assist for standing balance at FWW due to sway.    Sensory Examination   Sensory Perception Comments Chronic numbness in B feet, slightly worse than baseline    Coordination   Coordination Comments Increased time needed for initiation of movement   Clinical Impression   Criteria for Skilled Therapeutic Intervention yes, treatment indicated   PT Diagnosis (PT) impaired mobility   Influenced by the following impairments weakness, decresaed activity tolerance, impaired balance   Functional limitations due to impairments incresaed difficulty completing bed mobility, transfers and ambulation   Clinical Presentation Stable/Uncomplicated   Clinical Presentation Rationale improving medical status, extubated, stable vital signs    Clinical Decision Making (Complexity) low complexity   Therapy Frequency (PT) Daily   Predicted Duration of Therapy Intervention (days/wks) 1 week   Planned Therapy Interventions (PT) balance training;bed mobility training;gait training;home exercise program;motor coordination training;neuromuscular re-education;patient/family education;strengthening;transfer training   Anticipated Equipment Needs at Discharge (PT) walker, rolling   Risk & Benefits of therapy have been explained evaluation/treatment results reviewed;care plan/treatment goals reviewed;risks/benefits reviewed;participants voiced agreement with care plan;current/potential barriers reviewed;participants included;patient   Clinical Impression Comments Patient demonstrates significant deconditioning but motivated to particpate in therapies.    PT Discharge Planning    PT Discharge Recommendation (DC Rec) [facility]-Mot[facility] patient will benefit from intensive, interdisciplinary therapy.  Anticipate will be able to tolerate 3 hours of therapy per day   PT Rationale for DC Rec Patient below baseline with significant deconditioning noted, but at baseline he is active and independent. He has supportive wife and accessible home with no stairs. He would benefit from intesive rehab at ARU to progress independence with mobility.    PT Brief overview of current status  Min/Mod assist +2 for bed mobility, min assist +2 for transfers and gait   Total Evaluation Time   Total Evaluation Time (Minutes) 9

## 2021-01-05 NOTE — PROGRESS NOTES
"   01/05/21 1514   Quick Adds   Type of Visit Initial Occupational Therapy Evaluation   Living Environment   People in home spouse   Current Living Arrangements house   Home Accessibility no concerns   Transportation Anticipated family or friend will provide   Living Environment Comments pt has no ISABELLA, no stairs w/in home. Pt has a walk-in shower.   Self-Care   Usual Activity Tolerance good   Current Activity Tolerance fair   Activity/Exercise/Self-Care Comment pt normally very ind- enjoys chopping wood and yard work. Pt is a retired    Disability/Function   Fall history within last six months no   Change in Functional Status Since Onset of Current Illness/Injury yes   General Information   Onset of Illness/Injury or Date of Surgery 01/04/21   Referring Physician Carline Lopez, MANUELITO CNP   Patient/Family Therapy Goal Statement (OT) return home, get stronger   Additional Occupational Profile Info/Pertinent History of Current Problem Stevie Dotson is a 65 year old male admitted on 12/30/2020, found to have bacterial meningitis, possible seizures. Unremarkable PMH.    Existing Precautions/Restrictions fall   Cognitive Status Examination   Orientation Status orientation to person, place and time   Cognitive Status Comments Pt able to follow 1-2 step commands. Pleasant demeanor   Pain Assessment   Patient Currently in Pain   (back/neck pain, \"sore all over\")   Range of Motion Comprehensive   Comment, General Range of Motion distal AROM WFL, shoulder flexion limited by weakness- ~ 80-90 degrees shoulder flexion   Strength Comprehensive (MMT)   Comment, General Manual Muscle Testing (MMT) Assessment L  > R, weak bilaterally. elbow flex/ext 4/5, shoulder flexion 3+/5 bilaterally   Coordination   Coordination Comments impaired finger to thumb opposition bilaterally   Bed Mobility   Comment (Bed Mobility) Min A , increased VC's for sup>sit w/ bed rail.   Transfers   Transfer Comments Min A x 2 "   Balance   Balance Comments dec balance w/ standing and mobility   Activities of Daily Living   BADL Assessment/Intervention bathing;upper body dressing;lower body dressing;feeding;grooming;toileting   Bathing Assessment/Intervention   [place] Level (Bathing) maximum assist (25% patient effort)   Upper Body Dressing Assessment/Training   [place] Level (Upper Body Dressing) moderate assist (50% patient effort)   Lower Body Dressing Assessment/Training   [place] Level (Lower Body Dressing) maximum assist (25% patient effort)   Grooming Assessment/Training   [place] Level (Grooming) minimum assist (75% patient effort)   Eating/Self Feeding   [place] Level (Feeding) set up   Toileting   [place] Level (Toileting) maximum assist (25% patient effort)   Clinical Impression   Criteria for Skilled Therapeutic Interventions Met (OT) yes   OT Diagnosis dec ind/safety w/ ADL's and IADL's   OT Problem List-Impairments impacting ADL activity tolerance impaired;balance;cognition;mobility;range of motion (ROM);strength;pain   Assessment of Occupational Performance 3-5 Performance Deficits   Identified Performance Deficits dec ind/safety w/ bed mobility, transfers, functional mobility, dressing, g/h, toileting, bathing, all IADL's   Planned Therapy Interventions (OT) IADL retraining;ADL retraining;ROM;strengthening;transfer training;home program guidelines;progressive activity/exercise   Clinical Decision Making Complexity (OT) moderate complexity   Therapy Frequency (OT) Daily   Predicted Duration of Therapy 7 days   Risks and Benefits of Treatment have been explained. Yes   Patient, Family & other staff in agreement with plan of care Yes   OT Discharge Planning    OT Discharge Recommendation (DC Rec) [facility]-Motivated patient will benefit from intensive, interdisciplinary therapy.  Anticipate will be able to tolerate 3 hours of therapy per day   OT Rationale for DC Rec Pt is already making  good gains from this morning. Anticipate w/ continued medical mgmt and participation in therapy that pt will continue to make strong gains. Pt was very ind PTA and will benefit from intensive rehab approach at ARU prior to returning home.    Total Evaluation Time (Minutes)   Total Evaluation Time (Minutes) 8

## 2021-01-05 NOTE — PROGRESS NOTES
[facility]    Infectious Disease Progress Note    Date of Service (when I saw the patient): 01/05/2021     Assessment & Plan   Stevie Dotson is a 65 year old male who was admitted on 12/30/2020.     Impression:  1. 65 y.o male with admitted with confusion and fever.   2. Progressive encephalopathy in setting of MRI results suggesting recent seizure   3. LP done suggestive of meningitis.    4. Started on broad spectrum antimicrobials and steroids   5. Intubated due to progressive encephalopathy.   6. CSF cultures now coming back with GPC In pairs and chains which along with positive blood cultures for step pneumo explains the presentation.        Recommendations:   Continue on Ceftriaxone BID  Follow up blood cultures continue to be negative  Anticipate discharge on IV ceftriaxone for 2 weeks.       Ria Palma MD    Interval History   Cultures as listed   Extubated   Fever better      Physical Exam   Temp: 98.2  F (36.8  C) Temp src: Oral BP: 134/82 Pulse: 74   Resp: 20 SpO2: 92 % O2 Device: None (Room air) Oxygen Delivery: 2 LPM  Vitals:    01/03/21 0600 01/04/21 0600 01/05/21 0503   Weight: 115.7 kg (255 lb 1.2 oz) 116 kg (255 lb 11.7 oz) 115.2 kg (254 lb)     Vital Signs with Ranges  Temp:  [98.2  F (36.8  C)-100.8  F (38.2  C)] 98.2  F (36.8  C)  Pulse:  [50-84] 74  Resp:  [15-24] 20  BP: (128-150)/(81-93) 134/82  SpO2:  [92 %-98 %] 92 %    Constitutional: awake, some what disoriented   Lungs: Clear to auscultation bilaterally, no crackles or wheezing  Cardiovascular: Regular rate and rhythm, normal S1 and S2, and no murmur noted  Abdomen: Normal bowel sounds, soft, non-distended, non-tender  Skin: No rashes, no cyanosis, no edema  Other:    Medications       sodium chloride 0.9%  500 mL Intravenous Once     cefTRIAXone  2 g Intravenous Q12H     heparin ANTICOAGULANT  5,000 Units Subcutaneous Q8H     insulin aspart  1-6 Units Subcutaneous Q4H     insulin glargine  7 Units  Subcutaneous QAM AC     levETIRAcetam  1,000 mg Oral or Feeding Tube BID     pantoprazole  40 mg Oral Daily     sodium chloride (PF)  3 mL Intracatheter Q8H       Data   All microbiology laboratory data reviewed.  Recent Labs   Lab Test 01/05/21  0837 01/04/21  0556 01/03/21  0623   WBC 22.8* 23.9* 19.8*   HGB 13.5 13.1* 12.9*   HCT 41.4 39.3* 39.3*   MCV 89 90 90    387 340     Recent Labs   Lab Test 01/05/21  0837 01/04/21  0556 01/03/21  0623   CR 0.81 0.78 0.83     No lab results found.  Recent Labs   Lab Test 12/30/20  2302 12/30/20  1033 12/30/20  0953 12/30/20  0937 12/30/20  0830   CULT No growth after 5 days No growth Moderate growth  Streptococcus pneumoniae  *  Critical Value/Significant Value, preliminary result only, called to and read back by  Janis Zafar RN at 1014 [id number].[id number]   No growth Cultured on the 1st day of incubation:  Streptococcus pneumoniae  *  Critical Value/Significant Value, preliminary result only, called to and read back by  Abdullahi Brooks RN. @2321. 12.30.20. BS.     (Note)  POSITIVE for STREPTOCOCCUS PNEUMONIAE by [employer] multiplex nucleic  acid test. Note: Streptococcus mitis group is known to cross react  with S. pneumoniae. Correlation with culture results and clinical  presentation is necessary. Final identification and antimicrobial  susceptibility testing will be verified by standard methods.    Specimen tested with Verig[employer] multiplex, gram-positive blood culture  nucleic acid test for the following targets: Staph aureus, Staph  epidermidis, Staph lugdunensis, other Staph species, Enterococcus  faecalis, Enterococcus faecium, Streptococcus species, S. agalactiae,  S. anginosus grp., S. pneumoniae, S. pyogenes, Listeria sp., mecA  (methicillin resistance) and Robert/B (vancomycin resistance).    Critical Value/Significant Value called to and read back by Abdullahi rBooks at 0205 on 12.31.20 by .         Attestation:  Total time on the floor involved in the patient's  care: 35 minutes. Total time spent in counseling/care coordination: >50%

## 2021-01-06 ENCOUNTER — APPOINTMENT (OUTPATIENT)
Dept: MRI IMAGING | Facility: CLINIC | Age: 66
DRG: 871 | End: 2021-01-06
Attending: INTERNAL MEDICINE
Payer: COMMERCIAL

## 2021-01-06 LAB
ANION GAP SERPL CALCULATED.3IONS-SCNC: 6 MMOL/L (ref 3–14)
BACTERIA SPEC CULT: NO GROWTH
BUN SERPL-MCNC: 25 MG/DL (ref 7–30)
CALCIUM SERPL-MCNC: 8.2 MG/DL (ref 8.5–10.1)
CHLORIDE SERPL-SCNC: 105 MMOL/L (ref 94–109)
CO2 SERPL-SCNC: 26 MMOL/L (ref 20–32)
CREAT SERPL-MCNC: 0.83 MG/DL (ref 0.66–1.25)
ERYTHROCYTE [DISTWIDTH] IN BLOOD BY AUTOMATED COUNT: 13.8 % (ref 10–15)
GFR SERPL CREATININE-BSD FRML MDRD: >90 ML/MIN/{1.73_M2}
GLUCOSE BLDC GLUCOMTR-MCNC: 106 MG/DL (ref 70–99)
GLUCOSE BLDC GLUCOMTR-MCNC: 109 MG/DL (ref 70–99)
GLUCOSE BLDC GLUCOMTR-MCNC: 110 MG/DL (ref 70–99)
GLUCOSE BLDC GLUCOMTR-MCNC: 117 MG/DL (ref 70–99)
GLUCOSE BLDC GLUCOMTR-MCNC: 131 MG/DL (ref 70–99)
GLUCOSE SERPL-MCNC: 115 MG/DL (ref 70–99)
HBA1C MFR BLD: 6.4 % (ref 0–5.6)
HCT VFR BLD AUTO: 40.2 % (ref 40–53)
HGB BLD-MCNC: 13.4 G/DL (ref 13.3–17.7)
LABORATORY COMMENT REPORT: NORMAL
MCH RBC QN AUTO: 30 PG (ref 26.5–33)
MCHC RBC AUTO-ENTMCNC: 33.3 G/DL (ref 31.5–36.5)
MCV RBC AUTO: 90 FL (ref 78–100)
PLATELET # BLD AUTO: 383 10E9/L (ref 150–450)
POTASSIUM SERPL-SCNC: 3.5 MMOL/L (ref 3.4–5.3)
RBC # BLD AUTO: 4.47 10E12/L (ref 4.4–5.9)
SARS-COV-2 RNA RESP QL NAA+PROBE: NEGATIVE
SODIUM SERPL-SCNC: 137 MMOL/L (ref 133–144)
SPECIMEN SOURCE: NORMAL
SPECIMEN SOURCE: NORMAL
WBC # BLD AUTO: 20.4 10E9/L (ref 4–11)

## 2021-01-06 PROCEDURE — 999N001017 HC STATISTIC GLUCOSE BY METER IP

## 2021-01-06 PROCEDURE — 250N000011 HC RX IP 250 OP 636: Performed by: NURSE PRACTITIONER

## 2021-01-06 PROCEDURE — 72158 MRI LUMBAR SPINE W/O & W/DYE: CPT

## 2021-01-06 PROCEDURE — 255N000002 HC RX 255 OP 636: Performed by: INTERNAL MEDICINE

## 2021-01-06 PROCEDURE — 120N000001 HC R&B MED SURG/OB

## 2021-01-06 PROCEDURE — 80048 BASIC METABOLIC PNL TOTAL CA: CPT | Performed by: NURSE PRACTITIONER

## 2021-01-06 PROCEDURE — 99233 SBSQ HOSP IP/OBS HIGH 50: CPT | Performed by: INTERNAL MEDICINE

## 2021-01-06 PROCEDURE — A9585 GADOBUTROL INJECTION: HCPCS | Performed by: INTERNAL MEDICINE

## 2021-01-06 PROCEDURE — 250N000013 HC RX MED GY IP 250 OP 250 PS 637: Performed by: STUDENT IN AN ORGANIZED HEALTH CARE EDUCATION/TRAINING PROGRAM

## 2021-01-06 PROCEDURE — 36415 COLL VENOUS BLD VENIPUNCTURE: CPT | Performed by: NURSE PRACTITIONER

## 2021-01-06 PROCEDURE — 87635 SARS-COV-2 COVID-19 AMP PRB: CPT | Performed by: INTERNAL MEDICINE

## 2021-01-06 PROCEDURE — 250N000009 HC RX 250: Performed by: INTERNAL MEDICINE

## 2021-01-06 PROCEDURE — 36569 INSJ PICC 5 YR+ W/O IMAGING: CPT

## 2021-01-06 PROCEDURE — 250N000013 HC RX MED GY IP 250 OP 250 PS 637: Performed by: NURSE PRACTITIONER

## 2021-01-06 PROCEDURE — 83036 HEMOGLOBIN GLYCOSYLATED A1C: CPT | Performed by: INTERNAL MEDICINE

## 2021-01-06 PROCEDURE — 72157 MRI CHEST SPINE W/O & W/DYE: CPT

## 2021-01-06 PROCEDURE — 272N000433 HC KIT CATH IV 18 OR 20G CM, POWERGLIDE W MAX BARRIER

## 2021-01-06 PROCEDURE — 85027 COMPLETE CBC AUTOMATED: CPT | Performed by: NURSE PRACTITIONER

## 2021-01-06 PROCEDURE — 36415 COLL VENOUS BLD VENIPUNCTURE: CPT | Performed by: INTERNAL MEDICINE

## 2021-01-06 RX ORDER — LEVETIRACETAM 1000 MG/1
1000 TABLET ORAL 2 TIMES DAILY
Status: ON HOLD | DISCHARGE
Start: 2021-01-06 | End: 2021-01-14

## 2021-01-06 RX ORDER — NICOTINE POLACRILEX 4 MG
15-30 LOZENGE BUCCAL
Status: DISCONTINUED | OUTPATIENT
Start: 2021-01-06 | End: 2021-01-07 | Stop reason: HOSPADM

## 2021-01-06 RX ORDER — ACETAMINOPHEN 325 MG/1
650 TABLET ORAL EVERY 4 HOURS PRN
Status: ON HOLD | DISCHARGE
Start: 2021-01-06 | End: 2021-01-14

## 2021-01-06 RX ORDER — GADOBUTROL 604.72 MG/ML
11 INJECTION INTRAVENOUS ONCE
Status: COMPLETED | OUTPATIENT
Start: 2021-01-06 | End: 2021-01-06

## 2021-01-06 RX ORDER — DEXTROSE MONOHYDRATE 25 G/50ML
25-50 INJECTION, SOLUTION INTRAVENOUS
Status: DISCONTINUED | OUTPATIENT
Start: 2021-01-06 | End: 2021-01-07 | Stop reason: HOSPADM

## 2021-01-06 RX ORDER — CEFTRIAXONE 1 G/1
2000 INJECTION, POWDER, FOR SOLUTION INTRAMUSCULAR; INTRAVENOUS EVERY 12 HOURS
Qty: 600 ML | Refills: 0 | Status: ON HOLD | DISCHARGE
Start: 2021-01-06 | End: 2021-01-15

## 2021-01-06 RX ADMIN — CEFTRIAXONE SODIUM 2 G: 2 INJECTION, POWDER, FOR SOLUTION INTRAMUSCULAR; INTRAVENOUS at 11:00

## 2021-01-06 RX ADMIN — HEPARIN SODIUM 5000 UNITS: 5000 INJECTION, SOLUTION INTRAVENOUS; SUBCUTANEOUS at 12:34

## 2021-01-06 RX ADMIN — CEFTRIAXONE SODIUM 2 G: 2 INJECTION, POWDER, FOR SOLUTION INTRAMUSCULAR; INTRAVENOUS at 21:32

## 2021-01-06 RX ADMIN — ACETAMINOPHEN 650 MG: 325 TABLET, FILM COATED ORAL at 17:26

## 2021-01-06 RX ADMIN — HEPARIN SODIUM 5000 UNITS: 5000 INJECTION, SOLUTION INTRAVENOUS; SUBCUTANEOUS at 04:49

## 2021-01-06 RX ADMIN — LEVETIRACETAM 1000 MG: 500 TABLET, FILM COATED ORAL at 20:17

## 2021-01-06 RX ADMIN — LIDOCAINE HYDROCHLORIDE ANHYDROUS 1 ML: 10 INJECTION, SOLUTION INFILTRATION at 10:33

## 2021-01-06 RX ADMIN — ACETAMINOPHEN 650 MG: 325 TABLET, FILM COATED ORAL at 21:32

## 2021-01-06 RX ADMIN — GADOBUTROL 11 ML: 604.72 INJECTION INTRAVENOUS at 15:10

## 2021-01-06 RX ADMIN — ACETAMINOPHEN 650 MG: 325 TABLET, FILM COATED ORAL at 08:26

## 2021-01-06 RX ADMIN — LEVETIRACETAM 1000 MG: 500 TABLET, FILM COATED ORAL at 08:02

## 2021-01-06 RX ADMIN — PANTOPRAZOLE SODIUM 40 MG: 40 TABLET, DELAYED RELEASE ORAL at 07:12

## 2021-01-06 ASSESSMENT — ACTIVITIES OF DAILY LIVING (ADL)
ADLS_ACUITY_SCORE: 21

## 2021-01-06 NOTE — CONSULTS
Care Management Initial Consult     General Information  Assessment completed with: Patient, Amada SHAKIRA        Primary Care Provider verified and updated as needed:     Readmission within the last 30 days:        Reason for Consult: discharge planning  Advance Care Planning: Advance Care Planning Reviewed: no concerns identified           Communication Assessment  Patient's communication style: spoken language (English or Bilingual)    Hearing Difficulty or Deaf: no   Wear Glasses or Blind: no     Cognitive  Cognitive/Neuro/Behavioral: .WDL except  Level of Consciousness: alert  Arousal Level: opens eyes spontaneously  Orientation: oriented x 4  Mood/Behavior: calm, cooperative  Best Language: 0 - No aphasia  Speech: hoarse, whispers     Living Environment:   People in home: spouse     Current living Arrangements: house      Able to return to prior arrangements:          Family/Social Support:  Care provided by: self  Provides care for: no one  Marital Status:   Wife          Description of Support System: Supportive, Involved          Current Resources:   Skilled Home Care Services:    Community Resources:    Equipment currently used at home: none  Supplies currently used at home:       Employment/Financial:  Employment Status: retired        Financial Concerns:              Lifestyle & Psychosocial Needs:                          Socioeconomic History     Marital status:        Spouse name: Not on file     Number of children: Not on file     Years of education: Not on file     Highest education level: Not on file                  Tobacco Use     Smoking status: Former Smoker     Smokeless tobacco: Never Used   Substance and Sexual Activity     Alcohol use: Yes       Comment: whenever i want     Drug use: No     Sexual activity: Yes       Partners: Female               Functional Status:  Prior to admission patient needed assistance:   Dependent ADLs:: Independent  Dependent IADLs:: Independent         Mental Health Status:  Mental Health Status: No Current Concerns        Chemical Dependency Status:  Chemical Dependency Status: No Current Concerns              Values/Beliefs:  Spiritual, Cultural Beliefs, Christianity Practices, Values that affect care: no                Additional Information:  Writer spoke with pt about ARU. Pt is agreeable and would like a referral sent to  ARU. Writer sent referral and called them to notify them of referral.      DIAN Olivera

## 2021-01-06 NOTE — PROGRESS NOTES
[facility]    Infectious Disease Progress Note    Date of Service (when I saw the patient): 01/06/2021     Assessment & Plan   Stevie Dotson is a 65 year old male who was admitted on 12/30/2020.     Impression:  1. 65 y.o male with admitted with confusion and fever.   2. Progressive encephalopathy in setting of MRI results suggesting recent seizure   3. LP done suggestive of meningitis.    4. Started on broad spectrum antimicrobials and steroids   5. Intubated due to progressive encephalopathy.   6. CSF cultures now coming back with GPC In pairs and chains which along with positive blood cultures for step pneumo explains the presentation.        Recommendations:   Continue on Ceftriaxone BID  Discussed with Dr. Rutherford patient new today complaining of low back pain, given bacteremia, reasonable to get lumbar spine MRI rule out discitis.   Follow up blood cultures continue to be negative  Anticipate discharge on IV ceftriaxone for 2 weeks. Orders are in.   Can get a MIDLINE today.       Ria Palma MD    Interval History   Cultures as listed   Extubated   Fever better      Physical Exam   Temp: 98.6  F (37  C) Temp src: Oral BP: 127/75 Pulse: 74   Resp: 16 SpO2: 92 % O2 Device: None (Room air)    Vitals:    01/03/21 0600 01/04/21 0600 01/05/21 0503   Weight: 115.7 kg (255 lb 1.2 oz) 116 kg (255 lb 11.7 oz) 115.2 kg (254 lb)     Vital Signs with Ranges  Temp:  [97.5  F (36.4  C)-99.1  F (37.3  C)] 98.6  F (37  C)  Pulse:  [68-94] 74  Resp:  [16-20] 16  BP: (119-151)/(70-87) 127/75  SpO2:  [92 %-95 %] 92 %    Constitutional: awake, some what disoriented   Lungs: Clear to auscultation bilaterally, no crackles or wheezing  Cardiovascular: Regular rate and rhythm, normal S1 and S2, and no murmur noted  Abdomen: Normal bowel sounds, soft, non-distended, non-tender  Skin: No rashes, no cyanosis, no edema  Other:    Medications       cefTRIAXone  2 g Intravenous Q12H     heparin ANTICOAGULANT   5,000 Units Subcutaneous Q8H     insulin aspart  1-3 Units Subcutaneous TID AC     insulin aspart  1-3 Units Subcutaneous At Bedtime     [Held by provider] insulin glargine  7 Units Subcutaneous QAM AC     levETIRAcetam  1,000 mg Oral or Feeding Tube BID     pantoprazole  40 mg Oral Daily     sodium chloride (PF)  3 mL Intracatheter Q8H       Data   All microbiology laboratory data reviewed.  Recent Labs   Lab Test 01/05/21  0837 01/04/21  0556 01/03/21  0623   WBC 22.8* 23.9* 19.8*   HGB 13.5 13.1* 12.9*   HCT 41.4 39.3* 39.3*   MCV 89 90 90    387 340     Recent Labs   Lab Test 01/05/21  0837 01/04/21  0556 01/03/21  0623   CR 0.81 0.78 0.83     No lab results found.  Recent Labs   Lab Test 12/30/20  2302 12/30/20  1033 12/30/20  0953 12/30/20  0937 12/30/20  0830   CULT No growth No growth Moderate growth  Streptococcus pneumoniae  *  Critical Value/Significant Value, preliminary result only, called to and read back by  Janis Zafar RN at 8194 [id number].DK   No growth Cultured on the 1st day of incubation:  Streptococcus pneumoniae  *  Critical Value/Significant Value, preliminary result only, called to and read back by  Abdullahi Brooks RN. @2321. 12.30.20. BS.     (Note)  POSITIVE for STREPTOCOCCUS PNEUMONIAE by Verigene multiplex nucleic  acid test. Note: Streptococcus mitis group is known to cross react  with S. pneumoniae. Correlation with culture results and clinical  presentation is necessary. Final identification and antimicrobial  susceptibility testing will be verified by standard methods.    Specimen tested with Verigene multiplex, gram-positive blood culture  nucleic acid test for the following targets: Staph aureus, Staph  epidermidis, Staph lugdunensis, other Staph species, Enterococcus  faecalis, Enterococcus faecium, Streptococcus species, S. agalactiae,  S. anginosus grp., S. pneumoniae, S. pyogenes, Listeria sp., mecA  (methicillin resistance) and Robert/B (vancomycin  resistance).    Critical Value/Significant Value called to and read back by Abdullahi Brooks at 0205 on 12.31.20 by SS.         Attestation:  Total time on the floor involved in the patient's care: 35 minutes. Total time spent in counseling/care coordination: >50%

## 2021-01-06 NOTE — PROCEDURES
[facility]    Single Lumen Midline Placement    Date/Time: 1/6/2021 10:37 AM  Performed by: Andrés Larkin RN  Authorized by: Ria Palma MD   Indications: vascular access    UNIVERSAL PROTOCOL   Site Marked: Yes  Prior Images Obtained and Reviewed:  Yes  Required items: Required blood products, implants, devices and special equipment available    Patient identity confirmed:  Verbally with patient, arm band and hospital-assigned identification number  Patient was reevaluated immediately before administering moderate or deep sedation or anesthesia  Confirmation Checklist:  Patient's identity using two indicators, relevant allergies, procedure was appropriate and matched the consent or emergent situation and correct equipment/implants were available  Time out: Immediately prior to the procedure a time out was called    Universal Protocol: the Joint Commission Universal Protocol was followed    Preparation: Patient was prepped and draped in usual sterile fashion           ANESTHESIA    Anesthesia: See MAR for details  Local Anesthetic:  Lidocaine 1% without epinephrine  Anesthetic Total (mL):  1      SEDATION    Patient Sedated: No        Preparation: skin prepped with ChloraPrep  Skin prep agent: skin prep agent completely dried prior to procedure  Sterile barriers: maximum sterile barriers were used: cap, mask, sterile gown, sterile gloves, and large sterile sheet  Hand hygiene: hand hygiene performed prior to central venous catheter insertion  Type of line used: Midline  Catheter type: single lumen  Lumen type: valved  Lot number: [id number]  Placement method: MST  Number of attempts: 1  Successful placement: yes  Orientation: right  Location: cephalic vein  Arm circumference: adults 10 cm  Extremity circumference: 36  Visible catheter length: 0  Internal length: 8 cm  Total catheter length: 8  Dressing and securement: antibiotic disc placed, blood removed, site cleaned, statlock and  sterile dressing applied  Post procedure assessment: blood return through all ports  PROCEDURE   Patient Tolerance:  Patient tolerated the procedure well with no immediate complications

## 2021-01-06 NOTE — PLAN OF CARE
Pt here with bacterial meningitis and seizure. A&Ox4. Neuros intact except flat affect, slow to respond, generalized weakness throughout. VSS. Tele NSR. Regular diet, thin liquids. Takes pills whole. Up with assist x2, GB and walker. Complaints of broad back pain, gave Tylenol x1 and ice packs. Craig placed for rentention. Bowel sounds active. Midline placed today for anti-biotics. MRI of spine to rule out abscess or hematoma. Pt scoring green on the Aggression Stop Light Tool. Plan . Discharge pending, possible to ARU tomorrow.

## 2021-01-06 NOTE — PROGRESS NOTES
Care Management Follow Up    Length of Stay (days): 7    Expected Discharge Date: 01/07/21     Concerns to be Addressed: discharge planning     Patient plan of care discussed at interdisciplinary rounds: Yes    Anticipated Discharge Disposition: Acute Rehab     Anticipated Discharge Services: None  Anticipated Discharge DME: None    Patient/family educated on Medicare website which has current facility and service quality ratings: yes  Education Provided on the Discharge Plan:    Patient/Family in Agreement with the Plan: yes    Referrals Placed by CM/SW: Post Acute Facilities  Private pay costs discussed: transportation costs    Additional Information:  Writer received a call from [facility] stating they can accept pt for tomorrow and asked for a 1pm ride. Writer scheduled a 1pm [facility] w/c ride at the request of the pt.     DIAN Olivera

## 2021-01-06 NOTE — PLAN OF CARE
Nursing shift note  Pt here with encephalitis. A&Ox4. LUE and LLE 3-4/5. RUE and RLE 4/5. BLE and BUE moderate /dorsiflexion. Voice is hoarse, whispers. VSS ex for low grade fever, 99.1. Given Tylenol PRN. Tele NSR. Regular diet, thin liquids. Takes pills whole with water. Up with A2/GB/W. Denies pain. Straight cathed for 600mL. Pt scoring green on the Aggression Stop Light Tool. Plan to discharge pending. ARU vs. TCU.     Behavior & Aggression Tool color:  green    Pt's belongings:   Remains with patient      Home medications: yes

## 2021-01-06 NOTE — INTERIM SUMMARY
[facility] Pre-Admission Screen    Referral Source:  [place] [address] [facility]  [address]  Admit date to referring facility: 12/30/2020    Physical Medicine and Rehab Consult Completed: No    Rehab Diagnosis:    Brain Dysfunction 02.1 Non-Traumatic due to acute infectious encephalopathy due to bacterial meningitis    Justification for Acute Inpatient Rehabilitation  Stevie Dotson is a 65 year old male admitted on 12/30/2020 with sepsis secondary to bacterial meningitis, acute infectious encephalopathy.  Later on the day of admission, while undergoing EEG monitoring the patient lost his airway protective reflexes requiring emergent intubation.  On 1/3, the patient went for MRI demonstrating possible purulent material versus less likely hemorrhage. Pt able to be successfully extubated 1/3.  Infectious diseases following for antibiotic recommendations (IV Cef via midline x 2 weeks is final plan), patient was transferred to hospitalist service on 1/4 for transfer out of the ICU and is now stabilized for discharge.    Patient requires an intensive inpatient rehab program to address the following acute impairments:impaired strength, impaired activity tolerance, impaired balance, impaired coordination and impaired cognition    Current Active Medical Management Needs/Risks for Clinical Complications  The patient requires the high level of rehabilitation physician supervision that accompanies the provision of intensive rehabilitation therapy.  The patient needs the services of the rehabilitation physician to assess the patient medically and functionally and to modify the course of treatment as needed to maximize the patient's capacity to benefit from the rehabilitation process.    Manage sepsis - pt will be on IV abx for 2 weeks due to positive for strep pnuemo, monitor for changes in neuro status, s/s of infection    Seizures - Pt started on Keppra for 2-3  months until f/u with neurology at which time tapering can begin    Manage elevated BPs - no formal dx of hypertension, intermittently hypertensive during intubation, may need initiation of antihypertensive meds if BPs persistently go high    Manage hypoalbuminemia 2/2 critical illness, pt started on tube feeds 1/2, follow nutritional intake    Pt with hyperglycemia during stay, likely related to steroid use, continues on sliding scale, no formal hx or diagnosis of DM 2, Hgb A1c 6.2%        Past Medical/Surgical History   Surgery in the past 100 days: No   Additional relevant past medical history: asplenia, GERD    Level of Functioning Prior to Admission:    Home Environment  Lives with: spouse  Living arrangements: house     Stairs to enter home:  No stairs      Equipment currently used at home: none  Activity/exercise/self-care comment: pt normally very ind- enjoys chopping wood and yard work. Pt is a retired  and still drives self.         Prior Functional Level Comment:  Pt I prior, AE.    Additional Comments: n/a    Level of Function: GG Scale (Section GG Functional Ability and Goals; CMS's GREENBERG Version 3.0 Manual effective 10.1.2019):  PT Current Function Goals for Rehab   Bed Rolling 3 Partial/moderate assistance 6 Independent   Supine to Sit 1 Dependent 6 Independent   Sit to Stand 1 Dependent 6 Independent   Transfer 1 Dependent 6 Independent   Ambulation 1 Dependent 6 Independent   Stairs Not completed 4 Supervision or touching assitance     OT Current Function Goals for Rehab   Feeding 5 Setup or clean-up assistance 6 Independent   Grooming 3 Partial/moderate assistance 6 Independent   Bathing 2 Substantial/maximal assistance 6 Independent   Upper Body Dressing 3 Partial/moderate assistance 6 Independent   Lower Body Dressing 2 Substantial/maximal assistance 6 Independent   Toileting 2 Substantial/maximal assistance 6 Independent   Toilet Transfer 1 Dependent 6 Independent   Tub/Shower Transfer  1 Dependent 6 Independent   Cognition Impaired Independent     SLP Current Function Goals for Rehab   Swallow Impaired Tolerate least restrictive diet without signs & symptoms of aspiration and adhere to safe swallow strategies   Communication Impaired Communicate basic needs effectively       Current Diet:  Regular diet and Thin liquids    Summary Statement:  L  > R, weak bilaterally. elbow flex/ext 4/5, shoulder flexion 3+/5 bilaterally; B hip flexion 3-/5; generalized weakness noted.  Cognitively, patient pulling at catheter tube but easily re-directable during session. Delayed responses but accurate throughout. Difficulty with multistep commands.  Pt requires min-mod A x 2 for supine to sit an transfers with FWW.  Pt was able to take 5 steps from bed to chair with min A x 2 and FWW, verbal cues for safety.  Weak voice, delibrate hand to nose, slow movements.  Pt currently requires 1:1 supervison with eating.    Expected Therapies and Services required during Inpatient Rehab admission  Intensity of Therapy: Patient requires intensive therapies not available in a lesser level of care. Patient is motivated, making gains, and can tolerate 3 hours of therapy a day.  Physical Therapy: 60 minutes per day, at least 5 days a week for 14 days  Occupational Therapy: 60 minutes per day, at least 5 days a week for 14 days  Speech and Language Therapy: 60 minutes per day, at least 5 days a week for 14 days  Rehabilitation Nursing Needs: Patient requires 24 hour Rehab Nursing to manage vitals, medication education, positioning, carryover of new rehab techniques, care coordination, skin integrity, blood sugar management, assess neurologic status, provide safe environment for patient at falls risk and monitor nutritional intake.    Precautions/restrictions/special needs:   Precautions: fall precautions   Restrictions: NA   Special Needs: IVs    Expected Level of Improvement: I with all transfers, household mobility and basic  ADLs.  SBA for stairs and bathing/showering.    Expected Length of time to achieve: 14 days    Anticipated Discharge Needs:  Anticipated Discharge Destination: Home  Anticipated Discharge Support: Family member  24/7 support available : Yes  Identified caregiver(s):  Spouse (both are retired)  Anticipated Discharge Needs: Home with outpatient therapy and IV antibiotics if indicated    Identified challenges/barriers:  Cognition, IVabx x 2 weeks (has home coverage if indicated)    Admissions Liaison Signature/Date/Time:      Physician statement of review and agreement:  I have reviewed and am in agreement of the need for IRF stay to address above functional and medical needs. In addition to above statements address, Patient requires intensive active and ongoing therapeutic intervention and multiple therapies; Patient requires medical supervision; Expected to actively participate in the intensive rehab program; Sufficiently stable to actively participate; Expectation for measurable improvement in functional capacity or adaption to impairments.    Physician Signature/Date/Time:

## 2021-01-06 NOTE — PROGRESS NOTES
[facility]    Hospitalist Progress Note    Assessment & Plan   Stevie Dotson is a 65 year old male admitted on 12/30/2020, found to have bacterial meningitis, possible seizures. Unremarkable PMH.      Hospital Summary: Initially admitted with sepsis secondary to bacterial meningitis, acute infectious encephalopathy.  Later on the day of admission, while undergoing EEG monitoring the patient lost her airway protective reflexes requiring emergent intubation.  On 1/3, the patient went for MRI demonstrating possible purulent material versus less likely hemorrhage. Pt able to be successfully extubated 1/3.  Infectious diseases following for antibiotic recommendations, patient was transferred to hospitalist service on 1/4 for transfer out of the ICU.     Sepsis 2/2 strep pneumo meningitis, improving  Possible seizures, resolved  Encephalopathy/encephalitis secondary to above, improving  CSF cultures grew GPC in pairs and chains along with positive blood cultures for strep pneumo -covered by ceftriaxone twice daily.  Has been on vEEG x 5 days, findings consistent to mod-severe encephalopathy with cortical dysfunction in the right hemisphere, this was significantly improved with sedation stopped, no electroclinical seizures or clear epileptiform discharges during the 5-day monitoring period. Mentation now improving, on 1/4 patient is disoriented to situation.  - --Dexamethasone 10 mg every 6 x 4 days (completed 1/4)  -Treated per ID with Ceftriaxone 2gm q 12 hours.   Fully oriented, up with therapy  Appetite improving, taking po  HA improving  Feels his UE and LE strength better today  + urinary retention. No sensation of retention  Constant moderate back pain, low back since admission.   LE strength better today  Afebrile  Wbc down to 20. Remains elevated in part 2/2 recent course steroids  Mid line placed per ID.   Has had constant back pain- low back over spine.     Plan;   -will get MRI T  and L spine with/without contrast to ensure no seeding of spine/spinal abscess. No cervical spine tenderness, discussed with ID.   -Ceftriaxone 2gm q12 hours for 14 more days starting 1/6- ordered per ID. Midline placed 1/6.   - Followed by neurology- now signed off, plan to continue Keppra and will need to see General Neurology ([facility]) in 2-3 months to follow Keppra dosing and likely begin tapering dose at that time, meningitis follow up. Discussed with Critical Care Neurologist.   -discontinue fosphenytoin.  -follow up Gen Neurology in several weeks once out of ARU  -ervin removed then placed again for urinary retention.   --Every 4 hours neuros, per neuro   -PT, OT. Will need ARU  - midline placed for iv abx    Addendum; 1700:   MRI L spine normal. T spine MRI pending. Low suspicion for T spine abscess. Await report.   If MRI nl, pt ready for discharge to ARU.      Acute respiratory failure 2/2 strep pneumo meningitis, improving  Required mechanical ventilation from 12/30-1/3.  No significant respiratory complaints or concerns.  Oxygen saturation 99% on 4 L nasal cannula, weaned down to 2L.  occ cough. Lungs clear. No oxygen needs    Plan;   --Encourage aggressive pulmonary hygiene  --Wean O2, goal oxygen saturation greater than 90%     Elevated blood pressure  No formal diagnosis of hypertension, has been intermittently hypertensive while intubated.  BP since extubation 120s/80s - 140s/80s.  Normotensive   --Monitor. No meds needed at this time.      Hypoalbuminemia 2/2 critical illness  Started on postpyloric tube feeds on 1/2, has been tolerating well. Electrolytes stable.  --Continue per nutrition recommendations  --Attempt SLP eval 1/5     Hyperglycemia  No formal history or diagnosis of diabetes mellitus, type II, Hgb A1c 6.2%. Requiring low amounts of sliding scale insulin, covered with 10u Lantus every AM. Hyperglycemia is likely related to steroid use.   --Decrease Lantus 7u every AM x 2,  then will likely stop lantus now that dexamethasone completed---> stopped lantus given poor po intake  Follow  - range off of lantus. Not on meds PTA  --Continue sliding scale for now     Normocytic Anemia  Hemoglobin 13.1, MCV 90.  No abnormal bleeding  --monitor      Urinary retention  Ervin removed 1/5. -600cc X 2 overnight.   Ervin placed today with 1300cc out  Likely 2/2 to acute illness/meningitis  Nl Renal fx  Am bmp  Keep ervin in for now, outpatient urology follow up for voiding trial. Probably needs in for 1-2 weeks to allow for bladder rest. Consider voiding trial at ARU.      Access: R UE midline, placed 1/6/21. Site looks good    Diet: diet per speech. Nutrition consult    DVT Prophylaxis: Heparin SQ  Ervin Catheter: in place, indication: Strict 1-2 Hour I&O  Code Status: Full Code               Disposition Plan-     Expected discharge: 1 days, recommended to pending therapy evals once adequate pain management/ tolerating PO medications, mental status at baseline and safe disposition plan/ TCU bed available.  Will likely need ARU per Therapy, RN to contact care coordinator. Will discuss time of discharge with ID    discusssed care plan with RN,pt,     Irwin Lopez MD  Text Page  (7am to 6pm)  Interval History   No new issues  cx- no change, no new growth    -Data reviewed today: I reviewed all new labs and imaging results over the last 24 hours. I personally reviewed labs and cx data last 24 hours.       Physical Exam   Temp: 98.6  F (37  C) Temp src: Oral BP: 127/75 Pulse: 74   Resp: 16 SpO2: 92 % O2 Device: None (Room air)    Vitals:    01/03/21 0600 01/04/21 0600 01/05/21 0503   Weight: 115.7 kg (255 lb 1.2 oz) 116 kg (255 lb 11.7 oz) 115.2 kg (254 lb)     Vital Signs with Ranges  Temp:  [97.5  F (36.4  C)-99.1  F (37.3  C)] 98.6  F (37  C)  Pulse:  [68-94] 74  Resp:  [16-20] 16  BP: (119-151)/(70-87) 127/75  SpO2:  [92 %-95 %] 92 %  I/O last 3 completed shifts:  In: 1160  [P.O.:1160]  Out: 3395 [Urine:3395]    Constitutional: In bed,  nad  Neck; supple NT  Respiratory: CTAB, breathing easily  Cardiovascular: RRR no r/g/m  RUE midline- site looks good  GI: soft, nt, nd  Skin/Integumen: no rash or edema  Ortho: focal tenderness over L upper Trapezius, no cervical or T spine tenderness. Focal L spine tenderness. No paraspinal tenderness.   No pain with passive ROM L shoulder.   Neuro: fully oriented. Nl speech and mentation ,  4-5/5  bilaterally-better today. Hip flexors 4/5 bilaterally- much improved from yesterday, nl dorso/plantar flexion, deltoids 3-4/5.  triceps 4/5, biceps 3-4/5. Strength UE and LE much improved today.   No clonus, upgoing toes bilaterally, patellar DTR 2+ symmetric  Decreased sensation BLE feet- not new  Diffusely symmetrically weak- much improved today      Medications       cefTRIAXone  2 g Intravenous Q12H     heparin ANTICOAGULANT  5,000 Units Subcutaneous Q8H     insulin aspart  1-3 Units Subcutaneous TID AC     insulin aspart  1-3 Units Subcutaneous At Bedtime     [Held by provider] insulin glargine  7 Units Subcutaneous QAM AC     levETIRAcetam  1,000 mg Oral or Feeding Tube BID     pantoprazole  40 mg Oral Daily     sodium chloride (PF)  10 mL Intracatheter Q8H     sodium chloride (PF)  3 mL Intracatheter Q8H       Data   Recent Labs   Lab 01/05/21  0837 01/04/21  0556 01/03/21  0623   WBC 22.8* 23.9* 19.8*   HGB 13.5 13.1* 12.9*   MCV 89 90 90    387 340    143 144   POTASSIUM 3.6 3.6 4.0   CHLORIDE 107 111* 113*   CO2 27 28 29   BUN 30 38* 39*   CR 0.81 0.78 0.83   ANIONGAP 4 4 2*   HERNESTO 8.1* 8.0* 8.2*   GLC 79 145* 175*   ALBUMIN  --  2.0* 1.9*   PROTTOTAL  --  6.3* 6.4*   BILITOTAL  --  0.8 0.7   ALKPHOS  --  52 53   ALT  --  52 53   AST  --  33 25       Imaging:   No results found for this or any previous visit (from the past 24 hour(s)).

## 2021-01-06 NOTE — PROGRESS NOTES
Hospitalist paged due to Urinary retention: Voided 475 with PVR btwn 400-600. Requesting straight cath orders. Pt reports he urinates large amounts 2x day at baseline. Pt asymptomatic, denies pain and denies the urge to further urinate.     Background: Craig discontinued yesterday at 1630, pt was DTV before this occurrence and previously straight cathed for 600ml.     Update: New order for intermittent catheterization

## 2021-01-07 ENCOUNTER — APPOINTMENT (OUTPATIENT)
Dept: PHYSICAL THERAPY | Facility: CLINIC | Age: 66
DRG: 871 | End: 2021-01-07
Attending: INTERNAL MEDICINE
Payer: COMMERCIAL

## 2021-01-07 ENCOUNTER — HOSPITAL ENCOUNTER (INPATIENT)
Facility: CLINIC | Age: 66
LOS: 8 days | Discharge: HOME HEALTH | DRG: 095 | End: 2021-01-15
Attending: PHYSICAL MEDICINE & REHABILITATION | Admitting: PHYSICAL MEDICINE & REHABILITATION
Payer: COMMERCIAL

## 2021-01-07 VITALS
WEIGHT: 254 LBS | HEIGHT: 73 IN | SYSTOLIC BLOOD PRESSURE: 115 MMHG | BODY MASS INDEX: 33.66 KG/M2 | DIASTOLIC BLOOD PRESSURE: 79 MMHG | HEART RATE: 85 BPM | RESPIRATION RATE: 16 BRPM | TEMPERATURE: 98 F | OXYGEN SATURATION: 94 %

## 2021-01-07 DIAGNOSIS — G03.9 MENINGITIS: Primary | ICD-10-CM

## 2021-01-07 LAB
ANION GAP SERPL CALCULATED.3IONS-SCNC: 4 MMOL/L (ref 3–14)
BUN SERPL-MCNC: 22 MG/DL (ref 7–30)
CALCIUM SERPL-MCNC: 8.2 MG/DL (ref 8.5–10.1)
CHLORIDE SERPL-SCNC: 106 MMOL/L (ref 94–109)
CO2 SERPL-SCNC: 27 MMOL/L (ref 20–32)
CREAT SERPL-MCNC: 0.82 MG/DL (ref 0.66–1.25)
ERYTHROCYTE [DISTWIDTH] IN BLOOD BY AUTOMATED COUNT: 13.7 % (ref 10–15)
GFR SERPL CREATININE-BSD FRML MDRD: >90 ML/MIN/{1.73_M2}
GLUCOSE BLDC GLUCOMTR-MCNC: 108 MG/DL (ref 70–99)
GLUCOSE SERPL-MCNC: 118 MG/DL (ref 70–99)
HCT VFR BLD AUTO: 40.5 % (ref 40–53)
HGB BLD-MCNC: 13.1 G/DL (ref 13.3–17.7)
MCH RBC QN AUTO: 29.3 PG (ref 26.5–33)
MCHC RBC AUTO-ENTMCNC: 32.3 G/DL (ref 31.5–36.5)
MCV RBC AUTO: 91 FL (ref 78–100)
PLATELET # BLD AUTO: 428 10E9/L (ref 150–450)
POTASSIUM SERPL-SCNC: 3.9 MMOL/L (ref 3.4–5.3)
RBC # BLD AUTO: 4.47 10E12/L (ref 4.4–5.9)
SODIUM SERPL-SCNC: 137 MMOL/L (ref 133–144)
WBC # BLD AUTO: 18 10E9/L (ref 4–11)

## 2021-01-07 PROCEDURE — 250N000011 HC RX IP 250 OP 636: Performed by: NURSE PRACTITIONER

## 2021-01-07 PROCEDURE — 250N000013 HC RX MED GY IP 250 OP 250 PS 637: Performed by: NURSE PRACTITIONER

## 2021-01-07 PROCEDURE — 80048 BASIC METABOLIC PNL TOTAL CA: CPT | Performed by: NURSE PRACTITIONER

## 2021-01-07 PROCEDURE — 128N000003 HC R&B REHAB

## 2021-01-07 PROCEDURE — 250N000013 HC RX MED GY IP 250 OP 250 PS 637: Performed by: STUDENT IN AN ORGANIZED HEALTH CARE EDUCATION/TRAINING PROGRAM

## 2021-01-07 PROCEDURE — 85027 COMPLETE CBC AUTOMATED: CPT | Performed by: NURSE PRACTITIONER

## 2021-01-07 PROCEDURE — 258N000003 HC RX IP 258 OP 636

## 2021-01-07 PROCEDURE — 99223 1ST HOSP IP/OBS HIGH 75: CPT | Mod: GC | Performed by: PHYSICAL MEDICINE & REHABILITATION

## 2021-01-07 PROCEDURE — 999N001017 HC STATISTIC GLUCOSE BY METER IP

## 2021-01-07 PROCEDURE — 97116 GAIT TRAINING THERAPY: CPT | Mod: GP

## 2021-01-07 PROCEDURE — 250N000013 HC RX MED GY IP 250 OP 250 PS 637: Performed by: PHYSICIAN ASSISTANT

## 2021-01-07 PROCEDURE — 36415 COLL VENOUS BLD VENIPUNCTURE: CPT | Performed by: NURSE PRACTITIONER

## 2021-01-07 PROCEDURE — 999N000190 HC STATISTIC VAT ROUNDS

## 2021-01-07 PROCEDURE — 99238 HOSP IP/OBS DSCHRG MGMT 30/<: CPT | Performed by: INTERNAL MEDICINE

## 2021-01-07 PROCEDURE — 250N000011 HC RX IP 250 OP 636: Performed by: PHYSICIAN ASSISTANT

## 2021-01-07 RX ORDER — CEFTRIAXONE 2 G/1
2 INJECTION, POWDER, FOR SOLUTION INTRAMUSCULAR; INTRAVENOUS EVERY 12 HOURS
Status: DISCONTINUED | OUTPATIENT
Start: 2021-01-07 | End: 2021-01-15 | Stop reason: HOSPADM

## 2021-01-07 RX ORDER — BISACODYL 10 MG
10 SUPPOSITORY, RECTAL RECTAL DAILY PRN
Status: DISCONTINUED | OUTPATIENT
Start: 2021-01-07 | End: 2021-01-15 | Stop reason: HOSPADM

## 2021-01-07 RX ORDER — HEPARIN SODIUM 5000 [USP'U]/.5ML
5000 INJECTION, SOLUTION INTRAVENOUS; SUBCUTANEOUS EVERY 8 HOURS
Status: ON HOLD | DISCHARGE
Start: 2021-01-07 | End: 2021-01-14

## 2021-01-07 RX ORDER — LEVETIRACETAM 500 MG/1
1000 TABLET ORAL 2 TIMES DAILY
Status: DISCONTINUED | OUTPATIENT
Start: 2021-01-07 | End: 2021-01-15 | Stop reason: HOSPADM

## 2021-01-07 RX ORDER — POLYETHYLENE GLYCOL 3350 17 G/17G
17 POWDER, FOR SOLUTION ORAL DAILY PRN
Status: DISCONTINUED | OUTPATIENT
Start: 2021-01-07 | End: 2021-01-15 | Stop reason: HOSPADM

## 2021-01-07 RX ORDER — ACETAMINOPHEN 325 MG/1
650 TABLET ORAL EVERY 4 HOURS PRN
Status: CANCELLED | OUTPATIENT
Start: 2021-01-07

## 2021-01-07 RX ORDER — AMOXICILLIN 250 MG
1 CAPSULE ORAL 2 TIMES DAILY
Status: DISCONTINUED | OUTPATIENT
Start: 2021-01-07 | End: 2021-01-15 | Stop reason: HOSPADM

## 2021-01-07 RX ORDER — FLUTICASONE PROPIONATE 50 MCG
1 SPRAY, SUSPENSION (ML) NASAL 2 TIMES DAILY
Status: DISCONTINUED | OUTPATIENT
Start: 2021-01-07 | End: 2021-01-15 | Stop reason: HOSPADM

## 2021-01-07 RX ORDER — HEPARIN SODIUM 5000 [USP'U]/.5ML
5000 INJECTION, SOLUTION INTRAVENOUS; SUBCUTANEOUS EVERY 12 HOURS
Status: DISCONTINUED | OUTPATIENT
Start: 2021-01-07 | End: 2021-01-12

## 2021-01-07 RX ORDER — ACETAMINOPHEN 325 MG/1
650 TABLET ORAL EVERY 4 HOURS PRN
Status: DISCONTINUED | OUTPATIENT
Start: 2021-01-07 | End: 2021-01-15 | Stop reason: HOSPADM

## 2021-01-07 RX ORDER — LIDOCAINE 40 MG/G
CREAM TOPICAL
Status: DISCONTINUED | OUTPATIENT
Start: 2021-01-07 | End: 2021-01-15 | Stop reason: HOSPADM

## 2021-01-07 RX ORDER — SODIUM CHLORIDE 9 MG/ML
INJECTION, SOLUTION INTRAVENOUS
Status: COMPLETED
Start: 2021-01-07 | End: 2021-01-07

## 2021-01-07 RX ADMIN — PANTOPRAZOLE SODIUM 40 MG: 40 TABLET, DELAYED RELEASE ORAL at 05:49

## 2021-01-07 RX ADMIN — HEPARIN SODIUM 5000 UNITS: 5000 INJECTION, SOLUTION INTRAVENOUS; SUBCUTANEOUS at 12:12

## 2021-01-07 RX ADMIN — SODIUM CHLORIDE 500 ML: 9 INJECTION, SOLUTION INTRAVENOUS at 20:22

## 2021-01-07 RX ADMIN — HEPARIN SODIUM 5000 UNITS: 5000 INJECTION, SOLUTION INTRAVENOUS; SUBCUTANEOUS at 20:28

## 2021-01-07 RX ADMIN — ACETAMINOPHEN 650 MG: 325 TABLET, FILM COATED ORAL at 05:49

## 2021-01-07 RX ADMIN — CEFTRIAXONE SODIUM 2 G: 2 INJECTION, POWDER, FOR SOLUTION INTRAMUSCULAR; INTRAVENOUS at 09:05

## 2021-01-07 RX ADMIN — LEVETIRACETAM 1000 MG: 500 TABLET, FILM COATED ORAL at 20:28

## 2021-01-07 RX ADMIN — ACETAMINOPHEN 650 MG: 325 TABLET, FILM COATED ORAL at 12:12

## 2021-01-07 RX ADMIN — HEPARIN SODIUM 5000 UNITS: 5000 INJECTION, SOLUTION INTRAVENOUS; SUBCUTANEOUS at 05:49

## 2021-01-07 RX ADMIN — FLUTICASONE PROPIONATE 1 SPRAY: 50 SPRAY, METERED NASAL at 20:33

## 2021-01-07 RX ADMIN — CEFTRIAXONE SODIUM 2 G: 2 INJECTION, POWDER, FOR SOLUTION INTRAMUSCULAR; INTRAVENOUS at 20:21

## 2021-01-07 RX ADMIN — SENNOSIDES AND DOCUSATE SODIUM 1 TABLET: 8.6; 5 TABLET ORAL at 20:29

## 2021-01-07 RX ADMIN — LEVETIRACETAM 1000 MG: 500 TABLET, FILM COATED ORAL at 07:48

## 2021-01-07 ASSESSMENT — ACTIVITIES OF DAILY LIVING (ADL)
ADLS_ACUITY_SCORE: 21
ADLS_ACUITY_SCORE: 20

## 2021-01-07 NOTE — PLAN OF CARE
Occupational Therapy Discharge Summary    Reason for therapy discharge:    Discharged to acute rehabilitation facility.    Progress towards therapy goal(s). See goals on Care Plan in [facility] electronic health record for goal details.  Goals not met.  Barriers to achieving goals:   discharge from facility.    Therapy recommendation(s):    Continued therapy is recommended.  Rationale/Recommendations:  to advance ADLs prior to return home.

## 2021-01-07 NOTE — PLAN OF CARE
RN: pt alert/oriented.  Slightly flat affect.  Up with gait belt, walker, and 1-2 assist.  Ambulated from room to shower by nurse's station with therapy this AM.  Showered.  Midline RUE intact, had IV abx as scheduled.  Vitals stable.  C/o L shoulder pain 4-6/10, relieved with ice and tylenol.  Mild BLLE edema.  Report given to ARU, pt discharged via [facility] at 1300.

## 2021-01-07 NOTE — PLAN OF CARE
Pt here with bacterial meningitis and possible seizure. A&O x4. Slow to respond, generalized weakness, and baseline numbness in bilateral feet. VSS. Tele NSR. Regular diet, thin liquids. Takes pills whole with water. Up with 2 GB/W. Weight shifting, pt refused frequent repositioning overnight. Tylenol given for back pain. RUE midline in place for antibiotics. Craig in place for retention. Plan for discharge to ARU today, ride set up for 1pm.     Behavior & Aggression Tool color: Green    Pt's belongings: Belongings from admission day present in pt's room            Home medications: N

## 2021-01-07 NOTE — PROGRESS NOTES
Speech Pathology    Spoke with RN.  Patient is planning to discharge to acute rehab today.  RN reports no c/o swallow difficulty with regular diet with thin liquids.  Chart notes indicate patient's lungs are clear. Recommend SLP for communication cognitive evaluation as indicated at acute rehab.

## 2021-01-07 NOTE — PROGRESS NOTES
Care Management Follow Up    Length of Stay (days): 8    Expected Discharge Date: 01/07/21     Concerns to be Addressed: discharge planning     Patient plan of care discussed at interdisciplinary rounds: Yes    Anticipated Discharge Disposition: Acute Rehab     Anticipated Discharge Services: None  Anticipated Discharge DME: None    Patient/family educated on Medicare website which has current facility and service quality ratings: yes  Education Provided on the Discharge Plan:    Patient/Family in Agreement with the Plan: yes    Referrals Placed by CM/SW: Post Acute Facilities  Private pay costs discussed: transportation costs    Additional Information:    SW received confirmation w/[facility] liaison that patient is accepted for today; discharging at 1pm via HE w/c. HE transport is aware pt is going to the 4th Floor.        DIAN Hand   [facility]  [phone]

## 2021-01-07 NOTE — DISCHARGE INSTRUCTIONS
Please follow up with neurology in 4-6 weeks. You may call any of the following neurology clinics for an appointment or a clinic of your choice. Tell them you were recently hospitalized and need follow up as recommended at discharge.    1. [facility] [phone]   [address], [address]  [place] [address]    2. [facility] [phone]   [address] [place] [address]    3. [facility] [phone]   Thang Barriga MD   [address] [place] [address]  4. [facility] [phone]   Thang Barriga MD   [address] [place] [address]      You are discharging to:  [facility]  [address] [place]  [address] [phone]

## 2021-01-07 NOTE — PROGRESS NOTES
Care Management Discharge Note    Discharge Date: 01/07/21  Expected Time of Departure: 1300    Discharge Disposition: Acute Rehab    Discharge Services: None    Discharge DME: None    Discharge Transportation: health plan transportation, other (see comments)    Private pay costs discussed: transportation costs    PAS Confirmation Code:  N/A  Patient/family educated on Medicare website which has current facility and service quality ratings: N/A    Education Provided on the Discharge Plan: Yes   Persons Notified of Discharge Plans: Yes  Patient/Family in Agreement with the Plan: yes    Handoff Referral Completed: Yes    Additional Information:    Orders and discharge summary to [facility] have been placed. No further SW interventions anticipated at this time.      DIAN Hand   [facility]  [phone]

## 2021-01-07 NOTE — PROGRESS NOTES
"SPIRITUAL HEALTH SERVICES  SPIRITUAL ASSESSMENT Progress Note  [facility]     REFERRAL SOURCE: Initial Visit with Patient    I shared a reflective visit with patient Jones today, integrating elements of illness and family narratives.    -Jones reflected on his time in the hospital. He shares this has been very hard for him. He processed the report he has received from medical providers and his family around what happened and brought him into the hospital. He shares it is scary to not remember much of what happened for a few days.    -Jones shares he and his spouse have had major life changes the last few years. He shares they moved from [place] to be close to their children. He shares he also retired. He names now his time will be \"focused on getting better\" which is unexpected for him.    -Jones shares he \"doesn't like to lay in bed\" and shares about his active lifestyle before this, riding his motorcycle, hunting, helping his kids out with their home projects. He shares his hope is to be able to ride again, hopefully this summer.    -Jones shares his family is his biggest source of support and being outside. He shares the difficulty of covid restrictions that they cannot be in the hospital. He names preparing for being at the rehab center and his determination to get stronger so he can be home.    I spent time offering emotional support that affirmed emotions, experiences and meaning.     PLAN: [facility] will continue to remain available.     Debra Esteves  Associate    Phone: [phone]  Pager: [phone]    "

## 2021-01-07 NOTE — DISCHARGE SUMMARY
"[facility]    Discharge Summary  Hospitalist    Date of Admission:  12/30/2020  Date of Discharge:  1/7/2021  Discharging Provider: Irwin Lopez MD    Discharge Diagnoses   Sepsis 2/2 strep pneumo meningitis, improving  Possible seizures, resolved  Encephalopathy/encephalitis secondary to above, improving    Urinary retention secondary to acute illness, meningitis    History of Present Illness   Stevie Dotson is a 65 year old normally healthy male admitted who presented to the ED on 12/30/2020 with AMS and fever.  Patient began to have a fever yesterday and later that day started acting differently.  He was reportedly \"picking at the sheets.\"  Upon waking this AM no longer speaking and was not moving his left side.  Patient's wife also noted that the patient was having shaking chills and bodyaches for the past 3 days.  He actually was tested for COVID yesterday (do not know results).       Of note, patient[place] patient could have been exposed to ticks but does not know of any bites or target lesions/rashes.  She also states that approximately 1 month ago the patient [place] significant [place] and bird populations.            Hospital Course   Stevie Dotson was admitted on 12/30/2020.  The following problems were addressed during his hospitalization:    Active Problems:    Encephalitis    Meningitis       Sepsis 2/2 strep pneumo meningitis, improving  Possible seizures, resolved  Encephalopathy/encephalitis secondary to above, improving  Patient began to have a fever yesterday and later that day started acting differently.  He was reportedly \"picking at the sheets.\"  Upon waking this AM no longer speaking and was not moving his left side.  Initially on presentation to [facility] ED a code stroke was called and neurology recommended transfer to our ED for stat MRI.    -A lumbar " puncture was also done and this was concerning for bacterial meningitis with 5,085 WBCs (88% neutrophils) and a glucose of 3.   - He was given IV Ampicillin, Ceftriaxone, Vancomycin, Acyclovir and Dexamethasone in the ED.   -Despite the AMS the patient was able to maintain his airway so he was not intubated.  Satting well otherwise.  Meets 3/4 SIRS criteria (hear rate, WBC and fever).  Lactate 2.9 initially but 1.8 after IVF   * CT head:  Normal   * CTA neck/head:  1. Mild atherosclerotic narrowing of the supraclinoid distal right  internal carotid artery.  2. Otherwise, normal neck and head CTA.  * COVID and influenza negative   - MRI brain and MRA head/neck showed:     1. There has been interval development of abnormal signal and  restricted diffusion in the occipital horns of the lateral ventricles  bilaterally as well as scattered foci of restricted diffusion in  multiple sulci along the right cerebral convexity and along the high  posterior parietal convexities bilaterally worrisome for purulent  material or, less likely, hemorrhage. Lumbar puncture may be helpful  for further evaluation for infection.  2. There is no hydrocephalus. There is no midline shift. There is no  evidence for recent ischemic infarct.  3. No sinusitis or mastoiditis      - treated emperically with  IV Ampicillin, Ceftriaxone and Vancomycin at meningitis dosing per ID. emperic acyclovir initially  -CSF: aspergillus ag csf neg, 1,3 Beta D glucan fungitell negative, + bact cx on csf showed Strep Pneumonie, cell ct 5K, HSV csf negative  -ucx ngtd  -Bld cx 12/30 strep pneumoniae , follow up bld cx ngtd  - --Dexamethasone 10 mg every 6 x 4 days (completed 1/4)  -Treated per ID with Ceftriaxone 2gm q 12 hours during hospital stay. Acyclovir and other emperic abx stopped per ID  - Had been on vEEG x 5 days, findings consistent to mod-severe encephalopathy with cortical dysfunction in the right hemisphere, this was significantly improved with  sedation stopped, no electroclinical seizures or clear epileptiform discharges during the 5-day monitoring period. Mentation now improving, on 1/4 patient is disoriented to situation.  -followed by critical neurology during stay.   - MRI/A showed decreased attenuation of R hemispheric vessels and evidence of purulent material in his ventricles and leptomeninges. EEG with severe diffuse encephalopathy and cortical dysfunction over R hemisphere. Had an episode of L arm stiffening prior to intubation c/f seizure, started on Keppra    -pt continue to improve during stay with improvement in mentation and orientation. He had diffuse UE and LE symmetric weakness and followed by PT, OT, neurology  -his po intake improved  - needed ISS and lantus while on steroids, completed course of steroids  HA improving  Feels his UE and LE strength improved in last 48 hours  C/o back pain, moderate on 1/6.   -MRI T and L spine obtained 1/6 which showed no signs of infection/abscess.   - pt had mild back pain on day of discharge and had continued improvement in LE strength.   + urinary retention. No sensation of retention  Afebrile  Wbc down to 18. Remains elevated in part 2/2 recent course steroids  Mid line placed 1/6 per ID.   Discussed with ID care plan on 1/6.   Plan at discharge:  -ARU  -follow up [facility] 6 weeks to follow up possible Sz, keppra dosing and meningitis  - no driving until evaluated by neurology outpatient  - midline while on iv abx  -Ceftriaxone 2gm q12 hours for 14 days per ID. Labs per ID while on abx.   -dvt proph with subcutaneous heparin     Acute respiratory failure 2/2 strep pneumo meningitis, improving  Required mechanical ventilation from 12/30-1/3.  No significant respiratory complaints or concerns.  Oxygen saturation 99% on 4 L nasal cannula, weaned down to 2L.  occ cough. Lungs clear. No oxygen needs   weaned off oxygen. Lungs clear.   IS     Elevated blood pressure  No formal diagnosis of  hypertension, has been intermittently hypertensive while intubated.  BP since extubation 120s/80s - 140s/80s.  Normotensive   --Monitor. No meds needed at this time.      Hypoalbuminemia 2/2 critical illness  Started on postpyloric tube feeds on 1/2, has been tolerating well. Electrolytes stable.  --Continue per nutrition recommendations  --Attempt SLP eval 1/5     Hyperglycemia  No formal history or diagnosis of diabetes mellitus, type II, Hgb A1c 6.2%. Requiring low amounts of sliding scale insulin, covered with 10u Lantus every AM. Hyperglycemia is likely related to steroid use.   -ISS and lantus while on steroids. Transitioned off insulin once steroids completed.   Off steroids BS  Low 100 range.      Normocytic Anemia  Hemoglobin 13.1, MCV 90.  No abnormal bleeding  --monitor      Urinary retention  Ervin removed 1/5. -600cc X 2 overnight.   Ervin placed today with 1300cc out  Likely 2/2 to acute illness/meningitis  Nl Renal fx  Keep ervin in for now, outpatient urology follow up for voiding trial. Probably needs in for 1-2 weeks to allow for bladder rest. Consider voiding trial at ARU.      Access: R UE midline, placed 1/6/21. Site looks good       Hx Alcohol use  Patient's wife reported to the ED that the patient drinks at least a 12 pack of beer/week but it may be more.    No evidence of etoh w/d during stay       Diet:  regular diet  DVT Prophylaxis: Pneumatic Compression Devices, subcutaneous heperin, stop at ARU once more ambulatory  Ervin Catheter: removed 1/5, but developed significant retention 1300cc so placed again on 1/6.   Code Status:   Full code per wife              Disposition Plan- discharge to ARU    Will need pcp follow up, [facility] follow up 6 weeks and possibly Urology follow up if urinary retention persists at ARU       Irwin Lopez MD, MD    Significant Results and Procedures   See hospital course    Pending Results   These results will be followed up by  ID  Unresulted Labs Ordered in the Past 30 Days of this Admission     Date and Time Order Name Status Description    12/30/2020 2013 Histoplasma Lyric by Immunodiffusion In process           Code Status   Full Code       Primary Care Physician   Physician No Ref-Primary    Physical Exam   Temp: 98.2  F (36.8  C) Temp src: Oral BP: (!) 144/87 Pulse: 77   Resp: 16 SpO2: 94 % O2 Device: None (Room air)    Vitals:    01/03/21 0600 01/04/21 0600 01/05/21 0503   Weight: 115.7 kg (255 lb 1.2 oz) 116 kg (255 lb 11.7 oz) 115.2 kg (254 lb)     Vital Signs with Ranges  Temp:  [98.2  F (36.8  C)-98.5  F (36.9  C)] 98.2  F (36.8  C)  Pulse:  [67-85] 77  Resp:  [14-18] 16  BP: (113-144)/(75-87) 144/87  SpO2:  [93 %-95 %] 94 %  I/O last 3 completed shifts:  In: 840 [P.O.:840]  Out: 3300 [Urine:3300]  Constitutional: In bed,  nad  Neck; supple NT  Respiratory:     CTAB, breathing easily  Cardiovascular: RRR no r/g/m  RUE midline- site looks good  GI: soft, nt, nd  Skin/Integumen: no rash or edema  Neuro: fully oriented. Nl speech and mentation ,  4-5/5  bilaterally-from 2 days ago. Hip flexors 4/5 bilaterally- much improved from 2 days ago, nl dorso/plantar flexion, deltoids 4/5.  triceps 4/5, biceps 4/5-all improved in last 48 hours. Strength UE and LE clearly improved last 48 hours  Decreased sensation BLE feet- not new          Discharge Disposition   Discharged to ARU  Condition at discharge: Good    Consultations This Hospital Stay   OCCUPATIONAL THERAPY ADULT IP CONSULT  CARE MANAGEMENT / SOCIAL WORK IP CONSULT  PHYSICAL THERAPY ADULT IP CONSULT  INFECTIOUS DISEASES IP CONSULT  PHARMACY TO DOSE VANCO  NEUROLOGY IP CONSULT  PHYSICAL THERAPY ADULT IP CONSULT  OCCUPATIONAL THERAPY ADULT IP CONSULT  NUTRITION SERVICES ADULT IP CONSULT  PHARMACY IP CONSULT  PHYSICAL THERAPY ADULT IP CONSULT  OCCUPATIONAL THERAPY ADULT IP CONSULT  SPEECH LANGUAGE PATH ADULT IP CONSULT  NUTRITION SERVICES ADULT IP CONSULT  VASCULAR ACCESS ADULT IP  CONSULT  PHYSICAL THERAPY ADULT IP CONSULT  OCCUPATIONAL THERAPY ADULT IP CONSULT    Time Spent on this Encounter   I, Irwin Lopez MD, personally saw the patient today and spent less than or equal to 30 minutes discharging this patient.    Discharge Orders      General info for SNF    Length of Stay Estimate: Short Term Care: Estimated # of Days <30  Condition at Discharge: Improving  Level of care:skilled   Rehabilitation Potential: Good  Admission H&P remains valid and up-to-date: Yes  Recent Chemotherapy: N/A  Use Nursing Home Standing Orders: Yes     Mantoux instructions    Give two-step Mantoux (PPD) Per Facility Policy Yes     Reason for your hospital stay    Sepsis 2/2 strep pneumo meningitis, improving  Possible seizures, resolved  Encephalopathy/encephalitis secondary to above, improving  Severe deconditioned state  Urinary retention     Intake and output    Every shift     Daily weights    Call Provider for weight gain of more than 2 pounds per day or 5 pounds per week.     Craig catheter    To straight gravity drainage. Change catheter every 2 weeks and PRN for leaking or decreased uring output with signs of bladder distention. DO NOT change catheter without a specific MD order IF diagnosis of benign prostatic hypertrophy (BPH), neurogenic bladder, or other urological conditions     Activity - Up with nursing assistance     Follow Up and recommended labs and tests    1. Follow up pcp 1 week after discharge from acute rehab  2. Follow up with [facility] in 6 weeks to follow up meningitis, Keppra dosing, possible seizure related to meningitis  3. Bmp, cbc with platelet count in 2 days  4. May need outpatient follow up with [facility] of urinary retention if fails voiding trial at ARU for urinary retention. Defer to ARU physician team.     Additional Discharge Instructions    Bmp, CBC with platelet count in 3 days then weekly  No driving or operating machinery until seen by  Neurologist outpatient.  May need trial of voiding with ervin removal while at rehab stay- defer to ARU physician rehab team  Routine ervin catheter cares     Full Code     Physical Therapy Adult Consult    Evaluate and treat as clinically indicated.    Reason:  Deconditioned.     Occupational Therapy Adult Consult    Evaluate and treat as clinically indicated.    Reason:  Deconditioned,     Fall precautions     Pneumatic Compression Device     Bilateral calf. Remove 30 mins BID.     Advance Diet as Tolerated    Follow this diet upon discharge: Orders Placed This Encounter      Regular Diet Adult Thin Liquids (water, ice chips, juice, milk, gelatin, ice cream, etc); No Straws     Discharge Medications   Current Discharge Medication List      START taking these medications    Details   acetaminophen (TYLENOL) 325 MG tablet Take 2 tablets (650 mg) by mouth every 4 hours as needed for mild pain  Qty:      Associated Diagnoses: Meningitis      cefTRIAXone (ROCEPHIN) 1 GM vial Inject 2 g (2,000 mg) into the vein every 12 hours for 14 days CBC with differential, creatinine, SGOT weekly while on this medication to be faxed to Dr. Ramirez office.  Qty: 600 mL, Refills: 0    Associated Diagnoses: Meningitis      Heparin Sodium, Porcine, (HEPARIN ANTICOAGULANT) 5000 UNIT/0.5ML injection Inject 0.5 mLs (5,000 Units) Subcutaneous every 8 hours  Qty:      Associated Diagnoses: Meningitis      levETIRAcetam (KEPPRA) 1000 MG tablet 1 tablet (1,000 mg) by Oral or Feeding Tube route 2 times daily  Qty:      Associated Diagnoses: Meningitis         CONTINUE these medications which have NOT CHANGED    Details   fluticasone (FLONASE) 50 MCG/ACT nasal spray Spray 1 spray into both nostrils 2 times daily      omeprazole (PRILOSEC) 20 MG DR capsule Take 20 mg by mouth daily      sildenafil (VIAGRA) 100 MG tablet Take 1 tablet (100 mg) by mouth daily as needed (ED)  Qty: 12 tablet, Refills: 1    Associated Diagnoses: Erectile dysfunction,  unspecified erectile dysfunction type         STOP taking these medications       oxymetazoline (AFRIN) 0.05 % nasal spray Comments:   Reason for Stopping:             Allergies   Allergies   Allergen Reactions     Precedex [Dexmedetomidine] Other (See Comments)     Significant bradycardia     Data   Most Recent 3 CBC's:  Recent Labs   Lab Test 01/06/21  0948 01/05/21  0837 01/04/21  0556   WBC 20.4* 22.8* 23.9*   HGB 13.4 13.5 13.1*   MCV 90 89 90    404 387      Most Recent 3 BMP's:  Recent Labs   Lab Test 01/06/21  0948 01/05/21  0837 01/04/21  0556    138 143   POTASSIUM 3.5 3.6 3.6   CHLORIDE 105 107 111*   CO2 26 27 28   BUN 25 30 38*   CR 0.83 0.81 0.78   ANIONGAP 6 4 4   HERNESTO 8.2* 8.1* 8.0*   * 79 145*     Most Recent 2 LFT's:  Recent Labs   Lab Test 01/04/21  0556 01/03/21  0623   AST 33 25   ALT 52 53   ALKPHOS 52 53   BILITOTAL 0.8 0.7     Most Recent INR's and Anticoagulation Dosing History:  Anticoagulation Dose History     There is no flowsheet data to display.        Most Recent 3 Troponin's:  Recent Labs   Lab Test 12/30/20  0830   TROPI <0.015     Most Recent Cholesterol Panel:  Recent Labs   Lab Test 03/08/19  0845   CHOL 232*   *   HDL 41   TRIG 138     Most Recent 6 Bacteria Isolates From Any Culture (See EPIC Reports for Culture Details):  Recent Labs   Lab Test 12/30/20  2302 12/30/20  1033 12/30/20  0953 12/30/20  0937 12/30/20  0830   CULT No growth No growth Moderate growth  Streptococcus pneumoniae  *  Critical Value/Significant Value, preliminary result only, called to and read back by  Janis Zafar RN at 1014 [id number].DK   No growth Cultured on the 1st day of incubation:  Streptococcus pneumoniae  *  Critical Value/Significant Value, preliminary result only, called to and read back by  Abdullahi Brooks RN. @2321. 12.30.20. BS.     (Note)  POSITIVE for STREPTOCOCCUS PNEUMONIAE by [employer] multiplex nucleic  acid test. Note: Streptococcus mitis group is known to  cross react  with S. pneumoniae. Correlation with culture results and clinical  presentation is necessary. Final identification and antimicrobial  susceptibility testing will be verified by standard methods.    Specimen tested with [employer]igene multiplex, gram-positive blood culture  nucleic acid test for the following targets: Staph aureus, Staph  epidermidis, Staph lugdunensis, other Staph species, Enterococcus  faecalis, Enterococcus faecium, Streptococcus species, S. agalactiae,  S. anginosus grp., S. pneumoniae, S. pyogenes, Listeria sp., mecA  (methicillin resistance) and Robert/B (vancomycin resistance).    Critical Value/Significant Value called to and read back by Abdullahi Brooks at 0205 on 12.31.20 by SS.       Most Recent TSH, T4 and A1c Labs:  Recent Labs   Lab Test 01/06/21  0948   A1C 6.4*     Results for orders placed or performed during the hospital encounter of 12/30/20   MRA Brain ([facility]) wo Contrast    Narrative    MR ANGIOGRAM OF THE HEAD WITHOUT CONTRAST December 30, 2020 12:13 PM     HISTORY: Right gaze, left hemiparesis, aphasia, evaluate for right MCA  thrombi.    TECHNIQUE: 3D time-of-flight MR angiogram of the head without  contrast.    COMPARISON: Same day head CTA 12/30/2020.      Impression    IMPRESSION: Moderate motion artifact. No definite large vessel  occlusion. Asymmetrically decreased flow-related enhancement  corresponding to the right middle cerebral artery distribution at the  M2 segments.    KAITLYNN LU MD   MR Brain w/o & w Contrast    Narrative    MRI BRAIN WITHOUT AND WITH CONTRAST December 30, 2020 12:12 PM    HISTORY: Right gaze, left hemiparesis, aphasia.     TECHNIQUE: Multiplanar, multisequence MRI of the brain without and  with 15mL Gadavist.    COMPARISON: Same day head CT 12/30/2020.    FINDINGS: Mild motion artifact. Mild volume loss is present. Few  scattered white matter T2 hyperintensities like represent mild chronic  small vessel ischemic change. No  evidence of acute ischemia,  hemorrhage, mass, mass effect, or hydrocephalus. No abnormal  enhancement or diffusion restriction.    Perfusion imaging demonstrates slight asymmetric transit time  corresponding to the right cerebral hemisphere. There is also slight  asymmetric decrease in blood flow and blood volume to the right  cerebral hemisphere.    The visualized calvarium, tympanic cavities, mastoid cavities, and  paranasal sinuses are unremarkable.      Impression    IMPRESSION:  1. No evidence of acute ischemia or hemorrhage.  2. Volume loss and white matter T2 hyperintensities which likely  present chronic small vessel ischemic change.  3. Slight asymmetric perfusion abnormalities involving the right  cerebral hemisphere.    KAITLYNN LU MD   MRA Neck (Carotids) wo & w Contrast    Narrative    MRA NECK WITHOUT AND WITH CONTRAST  12/30/2020 12:13 PM     HISTORY: Right gaze, left hemiparesis, aphasia.    TECHNIQUE: 2D time-of-flight MR angiogram of the neck without contrast  and 3D MR angiogram of the neck with 15 mL Gadavist. Estimates of  carotid stenoses are made relative to the distal internal carotid  artery diameters except as noted.    COMPARISON: Same day CTA of the head and neck.      Impression    IMPRESSION:  Motion limited exam. No convincing evidence of large  vessel occlusion or high-grade stenosis.    KAITLYNN LU MD   XR Chest Port 1 View    Narrative    CHEST PORTABLE ONE VIEW   12/30/2020 5:17 PM     HISTORY: Post intubation.    COMPARISON: Chest x-ray 12/30/2020.      Impression    IMPRESSION: Portable chest. Lungs are clear. Heart is normal in size.  No pneumothorax. No definite pleural effusions. Endotracheal tube  terminates approximately 4 cm above the lelia. Nasogastric tube  extends below the left hemidiaphragm presumably in the gastric lumen.    LEDA RAMIREZ MD   XR Abdomen Port 1 View    Narrative    XR PORTABLE ABDOMEN ONE VIEW   1/2/2021 8:09 AM     HISTORY: Feeding tube  placement    COMPARISON: None.      Impression    IMPRESSION: Enteric feeding tube is identified, its tip in the  expected position of the distal duodenum. Nonobstructive bowel gas  pattern. Nasogastric tube within the stomach.    ZARIA LAGOS MD   MR Brain w/o & w Contrast    Narrative    MRI OF THE BRAIN WITHOUT AND WITH CONTRAST 1/3/2021 1:17 PM     COMPARISON: Brain MR 12/30/2020    HISTORY: Bacterial meningitis, asymmetric EEG, question of stroke on  right.      TECHNIQUE: Multi-sequence, multi-planar MRI images of the brain were  acquired before and after the administration of IV gadolinium (12 mL  Gadavist).      Impression    IMPRESSION:   1. There has been interval development of abnormal signal and  restricted diffusion in the occipital horns of the lateral ventricles  bilaterally as well as scattered foci of restricted diffusion in  multiple sulci along the right cerebral convexity and along the high  posterior parietal convexities bilaterally worrisome for purulent  material or, less likely, hemorrhage. Lumbar puncture may be helpful  for further evaluation for infection.  2. There is no hydrocephalus. There is no midline shift. There is no  evidence for recent ischemic infarct.  3. No sinusitis or mastoiditis.    SEEMA LEPE MD   MRA Brain ([facility]) wo Contrast    Narrative    MR ANGIOGRAM OF THE HEAD WITHOUT CONTRAST   1/3/2021 1:08 PM     COMPARISON: [facility] MRA 12/30/2020    HISTORY: Questionable stroke on right, meningitis.    TECHNIQUE:  3D time-of-flight MR angiogram of the head without  contrast. MIP reconstruction of all MR angiographic data was  performed.      Impression    IMPRESSION:  Study is limited by patient motion. Evaluation of the  vessels at the base of the brain is limited due to motion. The  bilateral distal cervical internal carotid, bilateral distal vertebral  and basilar arteries appear within normal limits. Flow is identified  in the bilateral anterior  cerebral and bilateral middle cerebral  artery branches but the main trunks of both ACAs and both MCAs are  secured by motion.    SEEMA LEPE MD   MR Lumbar Spine w/o & w Contrast    Narrative    MRI OF THE LUMBAR SPINE WITHOUT AND WITH CONTRAST 1/6/2021 3:32 PM     HISTORY: Bacterial meningitis, low back pain, rule out abscess,  urinary retention, lower extremity weakness (improving).    TECHNIQUE: Multiplanar, multisequence MRI images of the lumbar spine  were acquired without and with 11 mL Gadavist IV contrast.    COMPARISON: None.    FINDINGS: There are five lumbar-type vertebrae for the purposes of  this dictation.     There is normal alignment of the lumbar vertebrae. Vertebral body  heights of the lumbar spine are normal. Marrow signal throughout the  lumbar vertebrae is within normal limits. There is no evidence for  fracture or pathologic bony lesion of the lumbar spine.     There are minimal posterior disc bulges/herniations at the L1-L2,  L2-L3 and L3-L4 levels.    The tip of the conus medullaris is at the L1-L2 level which is within  normal limits. There is no evidence for intrathecal abnormality.     Level by level:     T12-L1: Normal disc height and signal. No herniation. Normal facets.  No spinal canal stenosis. No foraminal stenosis on either side.     L1-L2: Loss of disc height, disc desiccation and mild circumferential  disc bulging. No herniation. Mild facet arthropathy bilaterally. No  spinal canal narrowing. No foraminal stenosis on either side.    L2-L3: Loss of disc height, disc desiccation and mild circumferential  disc bulging. No herniation. Moderate facet arthropathy bilaterally.  Minimal spinal canal narrowing. No foraminal stenosis on either side.    L3-L4: Loss of disc height, disc desiccation and circumferential disc  bulging with a superimposed tiny posterior central disc herniation  (protrusion). Moderate facet arthropathy bilaterally. Minimal spinal  canal narrowing. Mild left  foraminal narrowing. Mild right foraminal  narrowing.    L4-L5: Normal disc height and contour. Severe facet arthropathy  bilaterally. No spinal canal stenosis. No foraminal stenosis on either  side.    L5-S1: Normal disc height and contour. Normal facets. No spinal canal  stenosis. No foraminal stenosis on either side.      Impression    IMPRESSION: Degenerative changes of the lumbar spine as detailed  above. No findings for paraspinous or epidural abscess.    SEEMA LEPE MD   MR Thoracic Spine w/o & w Contrast    Narrative    MRI THORACIC SPINE WITHOUT AND WITH CONTRAST  2021 4:01 PM     HISTORY: Bacterial meningitis, bilateral upper extremity and bilateral  lower extremity weakness (improving), constant midline low back pain.  Rule out abscess.    TECHNIQUE: Multiplanar multisequence MRI of the thoracic spine without  and with 11 mL Gadavist.    COMPARISON: None.    FINDINGS: Mild motion artifact. Alignment is within normal limits.  Bone marrow is within normal limits. No abnormal cord signal. Mild  degenerative change is present. No high-grade spinal canal or  foraminal stenosis. Right T10-T11 nerve root sleeve cyst noted. No  abnormal enhancement. Paraspinal soft tissues are unremarkable.      Impression    IMPRESSION:    1. Mild degenerative change.  2. No evidence of abscess.    KAITLYNN LU MD   Echocardiogram Complete    Narrative    [id number]  [id number]  [id number]  [id number]^MARKO^A           [facility]  Echocardiography Laboratory  [address] [place] [address]        Name: CLARENCE DEL REAL  MRN: [id number]  : 1955  Study Date: 2020 01:33 PM  Age: 65 yrs  Gender: Male  Patient Location: [facility]  Reason For Study: Endocarditis  Ordering Physician: MARKO JENSEN  Performed By: Joaquin Dunlap     BSA: 2.4 m2  Height: 73 in  Weight: 251 lb  HR: 60  BP: 95/66 mmHg  _____________________________________________________________________________  __         Procedure  Complete Portable Echo Adult.  _____________________________________________________________________________  __        Interpretation Summary     1. The left ventricle is normal in structure, function and size. The visual  ejection fraction is estimated at 60%.  2. The right ventricle is moderately dilated. The right ventricular systolic  function is normal.  3. No valve disease.  4. The ascending aorta is Mildly dilated. 4.0cm.     No previous echo for comparison.  _____________________________________________________________________________  __        Left Ventricle  The left ventricle is normal in structure, function and size. There is normal  left ventricular wall thickness. The visual ejection fraction is estimated at  60%. Left ventricular diastolic function is indeterminate. Normal left  ventricular wall motion.     Right Ventricle  The right ventricle is moderately dilated. The right ventricular systolic  function is normal.     Atria  The left atrium is mildly dilated. The right atrium is severely dilated. There  is no atrial shunt seen.     Mitral Valve  The mitral valve is normal in structure and function.        Tricuspid Valve  There is mild (1+) tricuspid regurgitation. The right ventricular systolic  pressure is approximated at 25.0 mmHg plus the right atrial pressure.     Aortic Valve  The aortic valve is normal in structure and function.     Pulmonic Valve  The pulmonic valve is normal in structure and function.     Vessels  The ascending aorta is Mildly dilated. Dilation of the inferior vena cava is  present with abnormal respiratory variation in diameter.     Pericardium  There is no pericardial effusion.        Rhythm  Sinus rhythm was noted.  _____________________________________________________________________________  __  MMode/2D Measurements & Calculations     IVSd: 0.97 cm  LVIDd: 6.3 cm  LVIDs: 5.2 cm  LVPWd: 0.77 cm  FS: 17.6 %  LV mass(C)d: 226.0 grams  LV mass(C)dI: 95.3  grams/m2  Ao root diam: 4.0 cm  LA dimension: 4.3 cm  asc Aorta Diam: 3.5 cm  LA/Ao: 1.1  LVOT diam: 2.2 cm  LVOT area: 3.7 cm2  LA Volume (BP): 81.6 ml  LA Volume Index (BP): 34.4 ml/m2  RWT: 0.24           Doppler Measurements & Calculations  MV E max yayo: 80.5 cm/sec  MV A max yayo: 47.5 cm/sec  MV E/A: 1.7  MV dec slope: 439.8 cm/sec2  MV dec time: 0.18 sec  PA acc time: 0.11 sec  TR max yayo: 249.8 cm/sec  TR max P.0 mmHg  Pulm Sys Yayo: 65.8 cm/sec  Pulm Willis Yayo: 47.4 cm/sec  Pulm S/D: 1.4  E/E' av.6  Lateral E/e': 9.7  Medial E/e': 13.5              _____________________________________________________________________________  __        Report approved by: Albania Rogers 2020 03:31 PM

## 2021-01-07 NOTE — PLAN OF CARE
Patient is a 65 year old male  admitted to room 431 via wheelchair.  Patient is alert and oriented X 3. See Epic for VS and assessment.  Patient is able to transfer A-2 person using walker. Patient was settled into their room, shown call light, tv, mealtimes etc. Oriented to unit. Will continue monitoring pain level and VS. Notifying MD with any concerns.  Follow MD orders for cares and medications.    Level of Schooling:high school. in   Ethnicity:  Marital Status: for 42 yrs  Dentures: No  Hearing Aid: No  Smoker:  No  Glasses: Yes reading eye glasses  Occupation: retired   Falls 0-1 mo: 0 2-6 mo: 0  Stairs prior function: Independent  Prior device use: Walker   Advanced Care Directive Referral to Social Work?Yes    Dr. Ricardo paged regarding Bisacodly supp for comstipation. Pts last Bm was 1/3 large per rn report. Not responding yet. Will put sticky note also

## 2021-01-07 NOTE — PLAN OF CARE
Pt here with bacterial meningitis and seizure. A&Ox4. Neuros intact except flat affect, slow to respond, generalized weakness throughout. VSS. Tele NSR. Regular diet, thin liquids. Takes pills whole. Up with assist x2, GB and walker. Complaints of broad back pain, gave Tylenol x1 and ice packs. Craig placed for rentention. Bowel sounds active. Midline placed today for anti-biotics. Had an MRI of spine to rule out abscess or hematoma. Pt scoring green on the Aggression Stop Light Tool. Plan to discharge to ARU at 1300 tomorrow.

## 2021-01-07 NOTE — PROVIDER NOTIFICATION
Paged regarding resumption of subcutaneous heparin. Per MAR plan to restart if imaging negative. No abscess or hematoma seen on imaging- ok to restart tonight.     Sara Pena PA-C
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
## 2021-01-07 NOTE — PLAN OF CARE
Physical Therapy Discharge Summary    Reason for therapy discharge:    Discharged to acute rehabilitation facility.    Progress towards therapy goal(s). See goals on Care Plan in [facility] electronic health record for goal details.  Goals not met.  Barriers to achieving goals:   discharge from facility.    Therapy recommendation(s):    Continued therapy is recommended.  Rationale/Recommendations:  Patient will benefit from continued therapies in intensive interdisciplinary environment to optimize functional mobility and independence.

## 2021-01-08 ENCOUNTER — APPOINTMENT (OUTPATIENT)
Dept: OCCUPATIONAL THERAPY | Facility: CLINIC | Age: 66
End: 2021-01-08
Payer: COMMERCIAL

## 2021-01-08 ENCOUNTER — APPOINTMENT (OUTPATIENT)
Dept: SPEECH THERAPY | Facility: CLINIC | Age: 66
End: 2021-01-08
Payer: COMMERCIAL

## 2021-01-08 ENCOUNTER — APPOINTMENT (OUTPATIENT)
Dept: PHYSICAL THERAPY | Facility: CLINIC | Age: 66
End: 2021-01-08
Payer: COMMERCIAL

## 2021-01-08 ENCOUNTER — APPOINTMENT (OUTPATIENT)
Dept: GENERAL RADIOLOGY | Facility: CLINIC | Age: 66
DRG: 095 | End: 2021-01-08
Attending: STUDENT IN AN ORGANIZED HEALTH CARE EDUCATION/TRAINING PROGRAM
Payer: COMMERCIAL

## 2021-01-08 PROCEDURE — 97110 THERAPEUTIC EXERCISES: CPT | Mod: GO

## 2021-01-08 PROCEDURE — 73030 X-RAY EXAM OF SHOULDER: CPT | Mod: 26 | Performed by: RADIOLOGY

## 2021-01-08 PROCEDURE — 97163 PT EVAL HIGH COMPLEX 45 MIN: CPT | Mod: GP

## 2021-01-08 PROCEDURE — 97116 GAIT TRAINING THERAPY: CPT | Mod: GP

## 2021-01-08 PROCEDURE — 73030 X-RAY EXAM OF SHOULDER: CPT | Mod: LT

## 2021-01-08 PROCEDURE — 97166 OT EVAL MOD COMPLEX 45 MIN: CPT | Mod: GO

## 2021-01-08 PROCEDURE — 97530 THERAPEUTIC ACTIVITIES: CPT | Mod: GP

## 2021-01-08 PROCEDURE — 99233 SBSQ HOSP IP/OBS HIGH 50: CPT | Performed by: PHYSICAL MEDICINE & REHABILITATION

## 2021-01-08 PROCEDURE — 250N000013 HC RX MED GY IP 250 OP 250 PS 637: Performed by: INTERNAL MEDICINE

## 2021-01-08 PROCEDURE — 92523 SPEECH SOUND LANG COMPREHEN: CPT | Mod: GN | Performed by: SPEECH-LANGUAGE PATHOLOGIST

## 2021-01-08 PROCEDURE — 97535 SELF CARE MNGMENT TRAINING: CPT | Mod: GO

## 2021-01-08 PROCEDURE — 250N000013 HC RX MED GY IP 250 OP 250 PS 637: Performed by: PHYSICIAN ASSISTANT

## 2021-01-08 PROCEDURE — 250N000013 HC RX MED GY IP 250 OP 250 PS 637: Performed by: PHYSICAL MEDICINE & REHABILITATION

## 2021-01-08 PROCEDURE — 128N000003 HC R&B REHAB

## 2021-01-08 PROCEDURE — 250N000011 HC RX IP 250 OP 636: Performed by: PHYSICIAN ASSISTANT

## 2021-01-08 PROCEDURE — 250N000011 HC RX IP 250 OP 636: Performed by: PHYSICAL MEDICINE & REHABILITATION

## 2021-01-08 PROCEDURE — 258N000003 HC RX IP 258 OP 636

## 2021-01-08 RX ORDER — LANOLIN ALCOHOL/MO/W.PET/CERES
3 CREAM (GRAM) TOPICAL
Status: DISCONTINUED | OUTPATIENT
Start: 2021-01-08 | End: 2021-01-15 | Stop reason: HOSPADM

## 2021-01-08 RX ORDER — SODIUM CHLORIDE 9 MG/ML
INJECTION, SOLUTION INTRAVENOUS
Status: COMPLETED
Start: 2021-01-08 | End: 2021-01-08

## 2021-01-08 RX ADMIN — HEPARIN SODIUM 5000 UNITS: 5000 INJECTION, SOLUTION INTRAVENOUS; SUBCUTANEOUS at 20:47

## 2021-01-08 RX ADMIN — Medication: at 20:53

## 2021-01-08 RX ADMIN — LEVETIRACETAM 1000 MG: 500 TABLET, FILM COATED ORAL at 20:47

## 2021-01-08 RX ADMIN — ACETAMINOPHEN 650 MG: 325 TABLET, FILM COATED ORAL at 00:58

## 2021-01-08 RX ADMIN — MELATONIN TAB 3 MG 3 MG: 3 TAB at 22:47

## 2021-01-08 RX ADMIN — LEVETIRACETAM 1000 MG: 500 TABLET, FILM COATED ORAL at 08:23

## 2021-01-08 RX ADMIN — FLUTICASONE PROPIONATE 1 SPRAY: 50 SPRAY, METERED NASAL at 08:23

## 2021-01-08 RX ADMIN — SENNOSIDES AND DOCUSATE SODIUM 1 TABLET: 8.6; 5 TABLET ORAL at 08:23

## 2021-01-08 RX ADMIN — ACETAMINOPHEN 650 MG: 325 TABLET, FILM COATED ORAL at 12:20

## 2021-01-08 RX ADMIN — SODIUM CHLORIDE 500 ML: 9 INJECTION, SOLUTION INTRAVENOUS at 20:48

## 2021-01-08 RX ADMIN — HEPARIN SODIUM 5000 UNITS: 5000 INJECTION, SOLUTION INTRAVENOUS; SUBCUTANEOUS at 08:22

## 2021-01-08 RX ADMIN — OMEPRAZOLE 20 MG: 20 CAPSULE, DELAYED RELEASE ORAL at 08:23

## 2021-01-08 RX ADMIN — CEFTRIAXONE SODIUM 2 G: 2 INJECTION, POWDER, FOR SOLUTION INTRAMUSCULAR; INTRAVENOUS at 08:22

## 2021-01-08 RX ADMIN — ACETAMINOPHEN 650 MG: 325 TABLET, FILM COATED ORAL at 05:21

## 2021-01-08 RX ADMIN — FLUTICASONE PROPIONATE 1 SPRAY: 50 SPRAY, METERED NASAL at 20:53

## 2021-01-08 RX ADMIN — SENNOSIDES AND DOCUSATE SODIUM 1 TABLET: 8.6; 5 TABLET ORAL at 20:47

## 2021-01-08 RX ADMIN — CEFTRIAXONE SODIUM 2 G: 2 INJECTION, POWDER, FOR SOLUTION INTRAMUSCULAR; INTRAVENOUS at 20:46

## 2021-01-08 RX ADMIN — ACETAMINOPHEN 650 MG: 325 TABLET, FILM COATED ORAL at 20:51

## 2021-01-08 ASSESSMENT — ACTIVITIES OF DAILY LIVING (ADL): PREVIOUS_RESPONSIBILITIES: MEAL PREP;LAUNDRY;SHOPPING;YARDWORK;MEDICATION MANAGEMENT;FINANCES;DRIVING

## 2021-01-08 NOTE — PROGRESS NOTES
"  [facility]   Acute Rehabilitation Unit  Daily progress note    INTERVAL HISTORY  Notes reviewed.  No acute events overnight, but is having \"muscular pain\" of the left shoulder and lower back which have been present since hospitalization.  Denies chest pain, shortness of breath, fevers, or chills, but is constipated and he can not recall his last bowel movement.  Functionally, therapy evaluations underway.        MEDICATIONS  Scheduled:    cefTRIAXone  2 g Intravenous Q12H     fluticasone  1 spray Both Nostrils BID     heparin ANTICOAGULANT  5,000 Units Subcutaneous Q12H     levETIRAcetam  1,000 mg Oral BID     omeprazole  20 mg Oral Daily     senna-docusate  1 tablet Oral BID     sodium chloride (PF)  10 mL Intracatheter Q8H        PRN:  acetaminophen, bisacodyl, lidocaine 4%, lidocaine (buffered or not buffered), polyethylene glycol, sodium chloride (PF)       PHYSICAL EXAM  Patient Vitals for the past 24 hrs:   BP Temp Temp src Pulse Resp SpO2 Weight   01/08/21 0757 125/73 98.5  F (36.9  C) Oral 77 18 93 % --   01/07/21 1630 129/80 97.6  F (36.4  C) Oral 69 20 96 % --   01/07/21 1340 122/78 99  F (37.2  C) Oral 78 18 95 % 107 kg (236 lb)       GEN: NAD, pleasant and cooperative  HEENT: NC/AT  CVS: RRR, S1+S2, no m/r/g  PULM: CTA b/l, no w/r/r  ABD: Soft, NT, ND, bowel sounds present  : Craig catheter in place, draining clear yellow urine  EXT: No LE edema or calf tenderness b/l.  +TTP over pectoralis and upper trapezius muscles.  +Hawkin's and Neer's.    Neuro: Answers appropriately, follows commands.  Psychomotor slowing.    LABS  CBC RESULTS:   Recent Labs   Lab Test 01/07/21  0854   WBC 18.0*   RBC 4.47   HGB 13.1*   HCT 40.5   MCV 91   MCH 29.3   MCHC 32.3   RDW 13.7          Last Comprehensive Metabolic Panel:  Sodium   Date Value Ref Range Status   01/07/2021 137 133 - 144 mmol/L Final     Potassium   Date Value Ref Range Status   01/07/2021 3.9 3.4 - 5.3 " mmol/L Final     Chloride   Date Value Ref Range Status   01/07/2021 106 94 - 109 mmol/L Final     Carbon Dioxide   Date Value Ref Range Status   01/07/2021 27 20 - 32 mmol/L Final     Anion Gap   Date Value Ref Range Status   01/07/2021 4 3 - 14 mmol/L Final     Glucose   Date Value Ref Range Status   01/07/2021 118 (H) 70 - 99 mg/dL Final     Urea Nitrogen   Date Value Ref Range Status   01/07/2021 22 7 - 30 mg/dL Final     Creatinine   Date Value Ref Range Status   01/07/2021 0.82 0.66 - 1.25 mg/dL Final     GFR Estimate   Date Value Ref Range Status   01/07/2021 >90 >60 mL/min/[1.73_m2] Final     Comment:     Non  GFR Calc  Starting 12/18/2018, serum creatinine based estimated GFR (eGFR) will be   calculated using the Chronic Kidney Disease Epidemiology Collaboration   (CKD-EPI) equation.       Calcium   Date Value Ref Range Status   01/07/2021 8.2 (L) 8.5 - 10.1 mg/dL Final       Recent Labs   Lab 01/07/21  0854 01/07/21  0321 01/06/21  2214 01/06/21  1659 01/06/21  1105 01/06/21  0948 01/06/21  0800 01/06/21  0356 01/05/21  0837 01/05/21  0837 01/04/21  0556 01/04/21  0556 01/03/21  0623 01/03/21  0623 01/02/21  0532 01/02/21  0532   *  --   --   --   --  115*  --   --   --  79  --  145*  --  175*  --  145*   BGM  --  108* 131* 109* 106*  --  117* 110*   < >  --    < >  --    < >  --    < >  --     < > = values in this interval not displayed.       IMPRESSION/PLAN:  Stevie Dotson is a 65 year old man who presented with altered mental status and fever diagnosed with meningitis admitted to acute rehab 1/7/21 for ongoing rehabilitation and medical management.      Admission to acute inpatient rehab bacterial meningitis  Impairment group code: 02.1        1. PT, OT and SLP 60 minutes of each on a daily basis, in addition to rehab nursing and close management of physiatrist.       2. Impairment of ADL's: Noted to have impaired strength, impaired activity tolerance, and impaired cognition  will benefit from ongoing OT with goal for MOD I with basic adls.      3. Impairment of mobility:  Noted to have impaired strength, and impaired activity tolerance will benefit from ongoing PT with goal for MOD I with basic mobility.         4. Impairment of cognition/language/swallow:  Noted to have impaired swallow, cognition, and vocal hoarseness will benefit from ongoing SLP with goal to improve ability to communicate basic needs.      5. Medical Conditions  #Strep pneumoniae meningitis-  # Encephalopathy-  presented with AMS and fever, Started on broad spectrum antimicrobials and steroids. CSF cultures & blood cultures positive for step pneumo. MRI/A showed decreased attenuation of R hemispheric vessels and evidence of purulent material in his ventricles and leptomeninges explains the presentation.   -weekly labs- per ID recs- (CBC w/ diff, BMP, SGOT)  -continue 2 additional weeks of ceftriaxone (from 1/6/21)  -continue PT/OT/SLP        #Possible seizures -Right hemispheric focal slowing and transient left arm stiffening at presentation worrisome for seizure, but no seizures or epileptiform discharges captured on EEG  -continue keppra  -follow up neurology         #Hyperglycemia- Hgb a1C 6.4%  consistent with pre- diabetes, was on insulin/ssi when on steroids bg improved with completion of steroids  -routine monitoring with labs  -follow up pcp        # Leucocytosis- in setting of infection and 4 day course of steroids- now down trending WBC 18 1/7  -trend     #Urinary retention (neurogenic bladder?)- with ervin catheter removed 1/5 with retention and replaced 1/6.   -plan for TOV ~ 1 week      #History of Alcohol Use- reportedly ~12 beers weekly per wife report patient reports a few beers on weeknds, no signs of withdrawal     #Polyneuropathy?: reports 20 year h/o paresthesia in feet (mostly toes and the plantar aspects). His symptoms have been clearly worse since hospitalization. Never had any work-up or  formal diagnosis. Likely secondary to alcohol use vs small fiber given the very slow course. May benefit from basic blood work and NCS/EMG as outpatient after discharge.     #L shoulder pain  -Likely muscular vs. Impingement.  Will obtain XR to ensure no major bony pathology   -Encourage Tylenol, will add Icy Hot     6. Adjustment to disability:  Clinical psychology to eval and treat as needed  7. FEN: reg  8. Bowel: continent  9. Bladder: ervin-  10. DVT Prophylaxis: subcutaneous heparin  11. GI Prophylaxis: ppi  12. Code: full  13. Disposition: home  14. ELOS: ~14 days  15. Rehab prognosis:  good  16. Follow up Appointments on Discharge: neurology, pcp          Yinka Ricardo MD  Department of Rehabilitation Medicine      Time Spent on this Encounter   I, Yinka Ricardo, spent a total of 35 minutes face-to-face or managing the care of Stevie Dotson. Over 50% of my time on the unit was spent counseling the patient and coordinating care. See note for details.

## 2021-01-08 NOTE — PLAN OF CARE
RN: Pt A/O x4, VSS. Pt rested in bed for entire of the shift, repositioned with staff assistance. Pt is able to make needs known. Bed alarm applied for safety. Craig in place and patent with adequate amount of urine output. IV abx infused without issues, midline dressing CDI. Appetite good. Pt denies pain or SOB and has no complaints. Continue with POC.

## 2021-01-08 NOTE — PLAN OF CARE
"Discharge Planner Post-Acute Rehab PT:     Discharge Plan: home with supervision from wife, HH vs OP PT pending progress with therapy on ARU.    Precautions: falls, L shoulder (pain in supraspinatus/upper trap)    Current Status:  Transfer: STS, FWW, CGA  Bed Mobility: sup<>sit, modA for trunk and BLE assist  Gait: amb 160', FWW, CGA w/c follow (will not need w/c follow going forward).  Stairs: up/down 3x6\" steps, B rails, CGA  Balance: requires BUE support in standing to maintain balance.    Assessment:  Jones will benefit from PT to progress strength, balance, coordination, and treat L shld (possible orthopedic injury + neuropathic pain) to improve mobility to amb with FWW mod I in room prior to d/c home.     Shoulder Assessment:  Palpation - pain with supraspinatus/upper trap. Noting moderate ant translation of humeral head in GH joint, appears possibly subluxed ant/sup. Less GH joint space inf to AC joint in L shld than R shld.  Strength - shld flex, abd weakness.  ROM - ROM WFL for shld flex, IR and ER.    Conclusion - at this time, think pt may have fallen at some point during AMS, hit shoulder with ant/sup subluxation. Reports mildly decreased pain with Grade 3 mobs to L humeral head post/inf.    Plan - start with K-tape for L upper trap for desensitization for possible neuropathic pain. Discussed plan with OT - thinking pt would benefit from a Jyoti sling for ant GH displacement.      Other Barriers to Discharge (DME, Family Training, etc): will issue FWW from [facility].     "

## 2021-01-08 NOTE — PROGRESS NOTES
01/08/21 1500   General Information   Onset of Illness/Injury or Date of Surgery 12/30/20   Referring Physician pete Lora   Pertinent History of Current Problem Stevie Dotson is a 65 year old man who presented with altered mental status and fever diagnosed with meningitis admitted to acute rehab 1/7/21 for ongoing rehabilitation and medical management.    General Observations SLP pt was seen during hospitalization for swallowing, and signed off   Type of Evaluation   Type of Evaluation Speech, Language, Cognition   Speech   Articulation (Motor Speech) WNL   Auditory Comprehension   Paragraph Comprehension (Auditory Comprehension) WNL  (7/8 correct)   Functional Assessment Scale (Auditory Comprehension) No Impairment   Verbal Expression   Word Finding Skills (Verbal Expression) WNL;other (see comments)  (defining words 9/10)   Narrative Speech (Verbal Expression) WNL   Functional Assessment Scale (Verbal Expression) No Impairment   Pragmatic Language   Verbal Skills (Pragmatic Language) WNL   Reading Comprehension   Comprehension Level (Reading Comprehension) paragraph level tasks;sentence level tasks   Functional Assessment Scale (Reading Comprehension) No Impairment   Paragraph Level, Comprehension Level (Reading Comprehension) intact  (9/10 related questions)   Written Language   Written Expression (Written Language) sentence level   Functional Assessment Scale (Written Expression) No Impairment   Comment, Assessment (Written Language) SLP: ok generating sentences, wrote uphill, but legible   Cognition   Cognitive Function attention deficit;executive function deficit;memory deficit   Additional cognitive-linguistic evaluation indicated  Recommended;Completed  (WJ-R test of verbal analogies, 78th percentile)   Attention Deficit (Cognition) minimal deficit   Executive Function Deficit (Cognition) problem-solving/reasoning  (category exclusion 1/3, numerical 1/2)   Memory Deficit (Cognition) minimal  deficit  (word recall 3/3, paragraph 4/8)   General Therapy Interventions   Planned Therapy Interventions Cognitive Treatment   SLP Therapy Assessment/Plan   Criteria for Skilled Therapeutic Interventions Met (SLP Eval) yes   SLP Diagnosis SLP mild cognitive linguistic deficits 2/2 encephalopathy   Rehab Potential (SLP Eval) good, to achieve stated therapy goals   Therapy Frequency (SLP Eval) daily   Predicted Duration of Therapy Intervention (SLP Eval) estimated 7-10 days   Comment, Therapy Assessment/Plan (SLP) SLP: pt was seen for communication/cognitive linguistic evaluation.  Noting language skills WNL.  Thus far noting at least mild deficits for cognitive communication abilities for reasoning, complex attention and memory, pt appears below baseline and would benfit from skilled intervention to improve comnmunication skills for increased safety and independence to return home   Therapy Plan Review/Discharge Plan (SLP)   Therapy Plan Review (SLP) evaluation/treatment results reviewed;care plan/treatment goals reviewed;risks/benefits reviewed;current/potential barriers reviewed;participants voiced agreement with care plan;participants included;patient    Total Evaluation Time   Total Evaluation Time (Minutes) 55

## 2021-01-08 NOTE — PHARMACY-MEDICATION REGIMEN REVIEW
Pharmacy Medication Regimen Review  Stevie Dotson is a 65 year old male who is currently in the Acute Rehab Unit.    Assessment: All medications have an appropriate indications, durations and no unnecessary use was found.    Plan:   Continue current treatment.  Please consider Pneumovax 23. Pt received Prevnar 13 on 9/11/2015.   Attending provider will be sent this note for review.  If there are any emergent issues noted above, pharmacist will contact provider directly by phone.      Pharmacy will periodically review the resident's medication regimen for any PRN medications not administered in > 72 hours and discontinue them. The pharmacist will discuss gradual dose reductions of psychopharmacologic medications with interdisciplinary team on a regular basis.    Please contact pharmacy if the above does not answer specific medication questions/concerns.    Background:  A pharmacist has reviewed all medications and pertinent medical history today.  Medications were reviewed for appropriate use and any irregularities found are listed with recommendations.      Current Facility-Administered Medications:      acetaminophen (TYLENOL) tablet 650 mg, 650 mg, Oral, Q4H PRN, Kacey Rose PA, 650 mg at 01/08/21 1220     bisacodyl (DULCOLAX) Suppository 10 mg, 10 mg, Rectal, Daily PRN, Kacey Rose PA     cefTRIAXone (ROCEPHIN) 2 g vial to attach to  ml bag for ADULTS or NS 50 ml bag for PEDS, 2 g, Intravenous, Q12H, Rebecca Ricardo MD, Last Rate: 200 mL/hr at 01/08/21 0822, 2 g at 01/08/21 0822     fluticasone (FLONASE) 50 MCG/ACT spray 1 spray, 1 spray, Both Nostrils, BID, Kacey Rose PA, 1 spray at 01/08/21 0823     heparin ANTICOAGULANT injection 5,000 Units, 5,000 Units, Subcutaneous, Q12H, Kacey Rose PA, 5,000 Units at 01/08/21 0822     levETIRAcetam (KEPPRA) tablet 1,000 mg, 1,000 mg, Oral, BID, Kacey Rose PA, 1,000 mg at 01/08/21 0823     lidocaine (LMX4) cream, ,  Topical, Q1H PRN, Kacey Rose PA     lidocaine 1 % 0.1-1 mL, 0.1-1 mL, Other, Q1H PRN, Kacey Rose PA     menthol (Topical Analgesic) 2.5% (BENGAY VANISHIN SCENT) 2.5 % topical gel, , Topical, Q6H PRN, Rebecca Ricardo MD     omeprazole (priLOSEC) CR capsule 20 mg, 20 mg, Oral, Daily, Kacey Rose PA, 20 mg at 01/08/21 0823     polyethylene glycol (MIRALAX) Packet 17 g, 17 g, Oral, Daily PRN, Kacey Rose PA     senna-docusate (SENOKOT-S/PERICOLACE) 8.6-50 MG per tablet 1 tablet, 1 tablet, Oral, BID, Kacey Rose PA, 1 tablet at 01/08/21 0823     sodium chloride (PF) 0.9% PF flush 10 mL, 10 mL, Intracatheter, Q8H, Kacey Rose PA, 10 mL at 01/07/21 2029     sodium chloride (PF) 0.9% PF flush 10-20 mL, 10-20 mL, Intracatheter, q1 min prn, Kacey Rose PA  No current outpatient prescriptions on file.   PMH: Strep pneumoniae meningitis, Encephalopathy, Possible seizures, hyperglycemia, Urinary retention (neurogenic bladder?), History of Alcohol Use, Polyneuropathy, acid reflux and asplenia

## 2021-01-08 NOTE — PLAN OF CARE
Patient alert and oriented x 4. Patient complained of lower back pain and L shoulder pain, medicated with Tylenol x 2, warm pack applied. Patient ervin intact and patent, draining yellow urine. Patient able to communicate needs. Patient sleeps in between care. No respiratory distress noted. Will continue with current plan of care.          Patient's most recent vital signs are:     Vital signs:  BP: 125/73  Temp: 98.5  HR: 77  RR: 18  SpO2: 93 %     Patient does not have new respiratory symptoms.  Patient does not have new sore throat.  Patient does not have a fever greater than 99.5.

## 2021-01-08 NOTE — PLAN OF CARE
Discharge Planner Post-Acute Rehab SLP:     Discharge Plan:home with assist    Precautions: fall    Current Status:  Communication: WNL  Cognition: mild deficits  Swallow: no problems reported    Assessment: SLP: pt was seen for communication/cognitive linguistic evaluation.  Noting language skills WNL.  Thus far noting at least mild deficits for cognitive communication abilities for reasoning, complex attention and memory, pt appears below baseline and would benfit from skilled intervention to improve comnmunication skills for increased safety and independence to return home    Other Barriers to Discharge (Family Training, etc): TBD

## 2021-01-08 NOTE — PROGRESS NOTES
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 Bathroom - raised toilet, walk in shower with fixed shower head. Bedroom - rehan bed, exits on L.   Self-Care   Usual Activity Tolerance good   Current Activity Tolerance moderate   Regular Exercise Other (see comments)   Activity/Exercise Type walking   Exercise Amount/Frequency other (see comments)   Equipment Currently Used at Home none   Activity/Exercise/Self-Care Comment PT: pt reports he is a retired . Enjoys biking with his wife in warmer weather. Also enjoys motorcycling.   Disability/Function   Hearing Difficulty or Deaf no   Wear Glasses or Blind no   Concentrating, Remembering or Making Decisions Difficulty no   Difficulty Communicating no   Difficulty Eating/Swallowing no   Walking or Climbing Stairs Difficulty no   Dressing/Bathing Difficulty no   Toileting issues no   Doing Errands Independently Difficulty (such as shopping) no   Fall history within last six months no   Change in Functional Status Since Onset of Current Illness/Injury yes   General Information   Onset of Illness/Injury or Date of Surgery 12/30/20   Referring Physician Haleigh   Patient/Family Therapy Goals Statement (PT) To return home safely ASAP   Pertinent History of Current Problem (include personal factors and/or comorbidities that impact the POC) CMH: bacterial meningitis with diffuse encephalopathy and initial AMS, L shld injury (likely partial ant/sup GH subluxation and supraspinatus/upper trap impingement).   Existing Precautions/Restrictions fall;other (see  comments)  (caution with mobilizing L shld)   General Observations PT: moves slowly with all mobility d/t reporeted full body soreness/pain.   Cognition   Affect/Mental Status (Cognition) WFL   Follows Commands (Cognition) WNL   Safety Deficit (Cognition) minimal deficit   Memory Deficit (Cognition) minimal deficit   Cognitive Status Comments PT: grossly appears intact, defer to OT and SLP for further detail.   Pain Assessment   Patient Currently in Pain Yes, see Vital Sign flowsheet  (L shoulder pain, unable to describe. Body soreness )   Integumentary/Edema   Integumentary/Edema no deficits were identifed   Posture    Posture Forward head position;Protracted shoulders   Posture Comments PT: age appropriate.   Strength   Manual Muscle Testing Quick Adds MMT: Shoulder;MMT: Elbow/Forearm;MMT: Wrist;MMT: Hand (General);MMT: Hip;MMT: Knee;MMT: Ankle   MMT: Shoulder   Shoulder Flexion - Left Side (1/5) trace, left   Shoulder ABduction - Left Side (1/5) trace, left   Shoulder Flexion - Right Side (3+/5) fair plus, right   Shoulder ABduction - Right Side (3+/5) fair plus, right   MMT: Elbow/Forearm, Rehab Eval   Elbow Flexion - Left Side (2/5) poor, left   Elbow Extension - Left Side (2/5) poor, left   Elbow Flexion - Right Side (3+/5) fair plus, right   Elbow Extension - Right Side (3+/5) fair plus, right   MMT: Hand   Hand Muscle Testing Results : R 4-/5, L 2+/5.   MMT: Hip, Rehab Eval   Hip Flexion - Left Side (4-/5) good minus, left   Hip ABduction - Left Side (4-/5) good minus, left   Hip ADduction - Left Side (4/5) good, left   Hip Flexion - Right Side (4-/5) good minus, right   Hip ABduction - Right Side (4-/5) good minus, right   Hip ADduction - Right Side (4/5) good, right   MMT: Knee, Rehab Eval   Knee Flexion - Left Side (4-/5) good minus, left   Knee Extension - Left Side (4-/5) good minus, left   Knee Flexion - Right Side (4-/5) good minus, right   Knee Extension - Right Side (4-/5) good minus, right    MMT: Ankle, Rehab Eval   Ankle Dorsiflexion - Left Side (4-/5) good minus, left   Ankle Plantarflexion - Left Side (4-/5) good minus, left   Ankle Dorsiflexion - Right Side (4-/5) good minus, right   Ankle Plantarflexion - Right Side (4-/5) good minus, right   ARC Assessment Only   Acute Rehab Functional Assessment See IP Rehab Daily Documentation Flowsheet for Functional Mobility/ADL Assessment   Balance   Balance other (describe)   Sitting Balance: Static good balance  (no sway sitting)   Sitting Balance: Dynamic good balance  (fair pelvic/trunk dissociation reaching with RUE)   Sit-to-Stand Balance fair balance  (no LOB into standing, good trunk control)   Standing Balance: Static fair balance  (requires BUE support)   Standing Balance: Dynamic fair balance  (requires FWW for balance)   Systems Impairment Contributing to Balance Disturbance cognitive;somatosensory;neuromuscular   Identified Impairments Contributing to Balance Disturbance impaired coordination;pain;decreased sensation;impaired sensory feedback;decreased strength   Balance Comments PT: limited by reported full body pain, likely neuropathic soreness from meningitis. Limited by L shld pain as well.   Balance Quick Add Sitting balance: Static;Sitting balance: dynamic;Sit to stand balance;Standing balance: static;Standing balance: dynamic;Systems impairment contributing to balance disturbance;Identified impairments contributing to balance disturbance   Sensory Examination   Sensory Perception other (describe)   Sensory Perception Quick Adds Proprioception   Sensation Light Touch   LUE mild impairment  (reports diminished sensation in LUE)   LLE w/i normal limits   RUE w/i normal limits   RLE w/i normal limits   Head/Neck w/i normal limits   Trunk w/i normal limits   Coordination   Coordination other (see comments)   Coordination Comments PT: finger to nose intact in RUE, heel to shin slowed in BLE, CHRISTIANA in BUE intact.   Muscle Tone   Muscle Tone no  deficits were identified   Muscle Tone Comments PT: grossly no change in tone noted   Clinical Impression   Criteria for Skilled Therapeutic Intervention yes, treatment indicated   PT Diagnosis (PT) PT: pain, balance, coordination, strength deficits 2/2 encephalopathy; LUE pain and mild numbness possibly 2/2 L orthopedic shld injury   Influenced by the following impairments Pain, strength, balance, coordination, endurance   Functional limitations due to impairments Bed mob, transfers, gait, stairs   Clinical Presentation Unstable/Unpredictable   Clinical Presentation Rationale PT: 4+ factors affecting POC   Clinical Decision Making (Complexity) high complexity   Therapy Frequency (PT) Daily  (60 min daily)   Predicted Duration of Therapy Intervention (days/wks) 14 days   Planned Therapy Interventions (PT) balance training;bed mobility training;gait training;home exercise program;manual therapy techniques;neuromuscular re-education;patient/family education;postural re-education;ROM (range of motion);stair training;strengthening;stretching;transfer training   Anticipated Equipment Needs at Discharge (PT) walker, rolling   Risk & Benefits of therapy have been explained evaluation/treatment results reviewed;care plan/treatment goals reviewed;risks/benefits reviewed;current/potential barriers reviewed;participants voiced agreement with care plan;participants included;patient   Clinical Impression Comments Jones will benefit from PT to progress strength, balance, coordination, and treat L shld (possible orthopedic injury + neuropathic pain) to improve mobility to amb with FWW mod I in room prior to d/c home.   Total Evaluation Time   Total Evaluation Time (Minutes) 35

## 2021-01-09 ENCOUNTER — APPOINTMENT (OUTPATIENT)
Dept: PHYSICAL THERAPY | Facility: CLINIC | Age: 66
End: 2021-01-09
Payer: COMMERCIAL

## 2021-01-09 ENCOUNTER — APPOINTMENT (OUTPATIENT)
Dept: OCCUPATIONAL THERAPY | Facility: CLINIC | Age: 66
End: 2021-01-09
Payer: COMMERCIAL

## 2021-01-09 ENCOUNTER — APPOINTMENT (OUTPATIENT)
Dept: SPEECH THERAPY | Facility: CLINIC | Age: 66
End: 2021-01-09
Payer: COMMERCIAL

## 2021-01-09 PROCEDURE — 250N000011 HC RX IP 250 OP 636: Performed by: PHYSICIAN ASSISTANT

## 2021-01-09 PROCEDURE — 96125 COGNITIVE TEST BY HC PRO: CPT | Mod: GN | Performed by: SPEECH-LANGUAGE PATHOLOGIST

## 2021-01-09 PROCEDURE — 250N000013 HC RX MED GY IP 250 OP 250 PS 637: Performed by: INTERNAL MEDICINE

## 2021-01-09 PROCEDURE — 250N000013 HC RX MED GY IP 250 OP 250 PS 637: Performed by: PHYSICIAN ASSISTANT

## 2021-01-09 PROCEDURE — 97140 MANUAL THERAPY 1/> REGIONS: CPT | Mod: GP | Performed by: PHYSICAL THERAPIST

## 2021-01-09 PROCEDURE — 97110 THERAPEUTIC EXERCISES: CPT | Mod: GP | Performed by: PHYSICAL THERAPIST

## 2021-01-09 PROCEDURE — 97112 NEUROMUSCULAR REEDUCATION: CPT | Mod: GP | Performed by: PHYSICAL THERAPIST

## 2021-01-09 PROCEDURE — 97530 THERAPEUTIC ACTIVITIES: CPT | Mod: GP | Performed by: PHYSICAL THERAPIST

## 2021-01-09 PROCEDURE — 128N000003 HC R&B REHAB

## 2021-01-09 PROCEDURE — 250N000011 HC RX IP 250 OP 636: Performed by: PHYSICAL MEDICINE & REHABILITATION

## 2021-01-09 PROCEDURE — 97535 SELF CARE MNGMENT TRAINING: CPT | Mod: GO | Performed by: OCCUPATIONAL THERAPIST

## 2021-01-09 RX ADMIN — SENNOSIDES AND DOCUSATE SODIUM 1 TABLET: 8.6; 5 TABLET ORAL at 20:48

## 2021-01-09 RX ADMIN — CEFTRIAXONE SODIUM 2 G: 2 INJECTION, POWDER, FOR SOLUTION INTRAMUSCULAR; INTRAVENOUS at 19:33

## 2021-01-09 RX ADMIN — Medication: at 20:51

## 2021-01-09 RX ADMIN — OMEPRAZOLE 20 MG: 20 CAPSULE, DELAYED RELEASE ORAL at 07:33

## 2021-01-09 RX ADMIN — SENNOSIDES AND DOCUSATE SODIUM 1 TABLET: 8.6; 5 TABLET ORAL at 06:33

## 2021-01-09 RX ADMIN — CEFTRIAXONE SODIUM 2 G: 2 INJECTION, POWDER, FOR SOLUTION INTRAMUSCULAR; INTRAVENOUS at 06:44

## 2021-01-09 RX ADMIN — ACETAMINOPHEN 650 MG: 325 TABLET, FILM COATED ORAL at 16:41

## 2021-01-09 RX ADMIN — FLUTICASONE PROPIONATE 1 SPRAY: 50 SPRAY, METERED NASAL at 07:32

## 2021-01-09 RX ADMIN — POLYETHYLENE GLYCOL 3350 17 G: 17 POWDER, FOR SOLUTION ORAL at 06:33

## 2021-01-09 RX ADMIN — MELATONIN TAB 3 MG 3 MG: 3 TAB at 22:04

## 2021-01-09 RX ADMIN — ACETAMINOPHEN 650 MG: 325 TABLET, FILM COATED ORAL at 12:20

## 2021-01-09 RX ADMIN — ACETAMINOPHEN 650 MG: 325 TABLET, FILM COATED ORAL at 20:48

## 2021-01-09 RX ADMIN — ACETAMINOPHEN 650 MG: 325 TABLET, FILM COATED ORAL at 06:33

## 2021-01-09 RX ADMIN — FLUTICASONE PROPIONATE 1 SPRAY: 50 SPRAY, METERED NASAL at 20:51

## 2021-01-09 RX ADMIN — HEPARIN SODIUM 5000 UNITS: 5000 INJECTION, SOLUTION INTRAVENOUS; SUBCUTANEOUS at 07:33

## 2021-01-09 RX ADMIN — LEVETIRACETAM 1000 MG: 500 TABLET, FILM COATED ORAL at 20:48

## 2021-01-09 RX ADMIN — HEPARIN SODIUM 5000 UNITS: 5000 INJECTION, SOLUTION INTRAVENOUS; SUBCUTANEOUS at 19:32

## 2021-01-09 RX ADMIN — Medication: at 12:20

## 2021-01-09 RX ADMIN — LEVETIRACETAM 1000 MG: 500 TABLET, FILM COATED ORAL at 07:33

## 2021-01-09 NOTE — PROGRESS NOTES
"   01/08/21 1300   Quick Adds   Type of Visit Initial Occupational Therapy Evaluation   Living Environment   People in home spouse   Current Living Arrangements house   Home Accessibility stairs to enter home  (half step)   Transportation Anticipated car, drives self;family or friend will provide   Living Environment Comments OT: Lives with spouse who works as RN part time. Main level living. Bathroom - raised toilet, walk in shower with fixed shower head. Bedroom - rehan bed, exits on L. 2 adult sons live ~1 hr away   Self-Care   Usual Activity Tolerance good   Current Activity Tolerance moderate   Regular Exercise Other (see comments)   Activity/Exercise Type walking   Exercise Amount/Frequency other (see comments)   Equipment Currently Used at Home none   Activity/Exercise/Self-Care Comment OT: pt reports he is a retired  and . Enjoys biking with his wife in warmer weather. Also enjoys motorcycling. Is primary decision maker for mom, visits his mom biweekly for drives and does his mom's bills. Likes to drive wife to/from work in the winter   Disability/Function   Hearing Difficulty or Deaf no   Wear Glasses or Blind yes   Vision Management readers   Concentrating, Remembering or Making Decisions Difficulty no   Difficulty Communicating no   Difficulty Eating/Swallowing no   Walking or Climbing Stairs Difficulty no   Dressing/Bathing Difficulty no   Toileting issues no   Doing Errands Independently Difficulty (such as shopping) no   Fall history within last six months no   Change in Functional Status Since Onset of Current Illness/Injury yes   General Information   Onset of Illness/Injury or Date of Surgery 12/30/20   Referring Physician Yinka Ricardo MD   Patient/Family Therapy Goal Statement (OT) \"Take care of myself like I did before\"   Additional Occupational Profile Info/Pertinent History of Current Problem Stevie Dotson is a 64 yo male with PMH of GERD, asplenia and alcohol use " "who was admitted on 12//30/20 with bacterial meningitis and seizures. He is on IV antibiotics and keppra which should be continues during his rehab stay. Functional deficits include left hemiparesis, possible cognitive/linguistic deficits (full eval is pending) and likely neurogenic bowel and bladder.    Existing Precautions/Restrictions fall   Limitations/Impairments other (see comments)  (Significant L shoulder/neck pain)   Left Upper Extremity (Weight-bearing Status) other (see comments)   Right Upper Extremity (Weight-bearing Status) full weight-bearing (FWB)   Left Lower Extremity (Weight-bearing Status) full weight-bearing (FWB)   Right Lower Extremity (Weight-bearing Status) full weight-bearing (FWB)   General Observations and Info Dysphonia   Cognitive Status Examination   Orientation Status orientation to person, place and time   Affect/Mental Status (Cognitive) flat/blunted affect  (somewhat flat, slow to respond)   Follows Commands WNL   Memory Deficit minimal deficit   Executive Function Deficit minimal deficit   Cognitive Status Comments MoCA version 8.1 scored 24/30: 1 error executive, 1 error language, 1 error abstraction, 3 errors delayed recall. Cognition has significantly improved s/p AMS per pt and is near baseline. Need to continue to monitor for returning to IADLs   Visual Perception   Impact of Vision Impairment on Function (Vision) Sees lines and \"swiggles\" on L side only since hospitalization   Sensory   Sensory Comments Has new numbness/tingling on L dorsal hand and radiates to entire L index finger. Denies any other numbness/tingling except chronic neuropathy for past 20 years from ankles down that hasn't changed   Pain Assessment   Patient Currently in Pain Yes, see Vital Sign flowsheet  (L shld 3/10 rest, 6/10 activity. LBP is new and 5/10)   Integumentary/Edema   Integumentary/Edema no deficits were identifed   Integumentary/Edema Comments Craig catheter   Range of Motion Comprehensive "   General Range of Motion upper extremity range of motion deficits identified   Comment, General Range of Motion RUE AROM WNL except limited  at shoulder d/t weakness. R shoulder flexion 120*, shoulder abduction 110*. LUE AROM shoulder very impaired d/t impingment, pain. Shoulder flexion 25*, shoulder abduction 30*, external rotation 20*. Tolerated PROM to 70* shoulder flex and abd.    General Upper Extremity Assessment (Range of Motion)   Upper Extremity: Range of Motion shoulder, left: UE ROM;shoulder, right: UE ROM;scapula, left: UE ROM   Comment: Upper Extremity ROM RUE AROM WNL except limited  at shoulder d/t weakness. R shoulder flexion 120*, shoulder abduction 110*. LUE AROM shoulder very impaired d/t impingment, pain. Shoulder flexion 25*, shoulder abduction 30*, external rotation 20*. Tolerated PROM to 70* shoulder flex and abd. Had no scapular elevation L side   Strength Comprehensive (MMT)   General Manual Muscle Testing (MMT) Assessment upper extremity strength deficits identified   Upper Extremity (Manual Muscle Testing)   Upper Extremity: Manual Muscle Testing (MMT) left shoulder strength deficit;right shoulder strength deficit   Comment, MMT: Upper Extremity R shldr flex 3-/5, shldr ext 4-/5, shldr abd 3-/5, shldr add 4/5. 4+/5 distal strength in RUE. L shoulder planes 2-/5, distal planes 3+/5 limited by pain   Muscle Tone Assessment   Muscle Tone Quick Adds No deficits were identified   Coordination   Coordination Comments Significantly decreased function of L side d/t suspected orthopedic injury but coordination of hand appears intact if he is not lifting arm to use hand   ARC Assessment Only   Acute Rehab Functional Assessment See IP Rehab Daily Documentation Flowsheet for Functional Mobility/ADL Assessment   Instrumental Activities of Daily Living (IADL)   Previous Responsibilities meal prep;laundry;shopping;yardwork;medication management;finances;driving   Clinical Impression   Criteria for  Skilled Therapeutic Interventions Met (OT) yes   OT Diagnosis functional decline in ADLs, IADLs, and mobility   OT Problem List-Impairments impacting ADL problems related to;activity tolerance impaired;balance;cognition;coordination;fear & anxiety;flexibility;mobility;range of motion (ROM);sensation;strength;pain;sensory feedback   Assessment of Occupational Performance 5 or more Performance Deficits   Identified Performance Deficits dressing, toileting, hygiene, grooming, transfers, mobility, bathing, driving, meal prep, finances   Planned Therapy Interventions (OT) ADL retraining;IADL retraining;balance training;bed mobility training;cognition;fine motor coordination training;joint mobilization;manual therapy;motor coordination training;neuromuscular re-education;orthoic fitting/training;ROM;strengthening;stretching;transfer training;home program guidelines;progressive activity/exercise;risk factor education   Clinical Decision Making Complexity (OT) moderate complexity   Therapy Frequency (OT) Daily   Predicted Duration of Therapy 14 days   Anticipated Equipment Needs Upon Discharge (OT) shower chair;walker, standard   Risks and Benefits of Treatment have been explained. Yes   Patient, Family & other staff in agreement with plan of care Yes   Comment-Clinical Impression Pt below baseline of IND with ADLs/driving/IADLs d/t encephalopathy, L shoulder injury/weakness, deconditioning, new low back pain and is motivated to participate in OT to progress independence. Requires OT to address identified deficits in order for pt to return home IND with ADLs and mobility. Has strong assist from spouse who is RN but still working   OT Discharge Planning    OT Discharge Recommendation (DC Rec) home with home care occupational therapy;home with outpatient occupational therapy

## 2021-01-09 NOTE — PLAN OF CARE
Discharge Planner Post-Acute Rehab OT:     Discharge Plan: home with spouse, HH vs OP OT pending progress    Precautions: fall, L shoulder pain    Current Status:  ADLs: CGA-min A transfers, min A bed mobility while supported LUE. Mod A LB socks, SBA to wash face with R hand, needs assist for any 2 handed task d/t significant LUE pain  IADLs: was IND at baseline  Vision/Cognition: Per pt cognition has significantly improved since AMS, scored 24/30 on MoCA and will continue to address for return to IADLs    Assessment: ADL performance very limited by L shoulder pain, weakness, deconditioning, decreased flexibility. L shoulder pain likely d/t impingement (may have fallen during AMS and is now orthopedic vs neuropathic pain) to be addressed with OT/PT and will need to implement compensatory strategies especially for any 2 handed tasks so pt can return home mod I with FWW    Other Barriers to Discharge (DME, Family Training, etc): Spouse is RN. Will need shower chair and FWW

## 2021-01-09 NOTE — PLAN OF CARE
Discharge Planner Post-Acute Rehab SLP:      Discharge Plan:home with assist     Precautions: fall     Current Status:  Communication: WNL  Cognition: mild deficits  Swallow: no problems reported     Assessment: SLP: pt was seen for communication/cognitive linguistic evaluation.  Noting language skills WNL.  Thus far noting at least mild deficits for cognitive communication abilities for reasoning, complex attention and memory, pt appears below baseline and would benfit from skilled intervention to improve comnmunication skills for increased safety and independence to return home. Completed RBANS :32nd percentile overall, immediate memory:25th; visualspatial/constructional 55th; language 39th; attention 16th, delayed memory 66th     Other Barriers to Discharge (Family Training, etc): TBD

## 2021-01-09 NOTE — PLAN OF CARE
Patient alert and oriented x 4. Patient complained of L shoulder pain, Tylenol, given, ice pack applied to affected area. Patient abdomen firm, last BM 01/08/21 but only a smear, Miralax given and senna scheduled given early, had prune juice this morning. No complaint of abdominal discomfort or urge to have a BM, can have bisacodyl suppository PRN if no result. Patient has an early therapy this morning, Rocephin IV given early via midline, midline intact and patent. Craig intact and and patent, draining clear yellow urine. Patient reported had a good night sleep this shift. No respiratory distress noted. Will continue with current plan of care.        Patient's most recent vital signs are:     Vital signs:  BP: 122/70  Temp: 99.2  HR: 89  RR: 18  SpO2: 92 %     Patient does not have new respiratory symptoms.  Patient does not have new sore throat.  Patient does not have a fever greater than 99.5.

## 2021-01-09 NOTE — PLAN OF CARE
"Discharge Planner Post-Acute Rehab PT:      Discharge Plan: home with supervision from wife, HH vs OP PT pending progress with therapy on ARU.     Precautions: falls, L shoulder (pain in supraspinatus/upper trap)     Current Status:  Transfer: STS, FWW, CGA  Bed Mobility: min A<>CGA with HOB elevated and use of siderail  Gait: amb 160', FWW, CGA   Stairs: up/down 3x6\" steps, B rails, CGA  Balance: Fair static and dynamic standing balance. TOMAS 1/9/21: 31/56     Assessment:  Pt continues to be progressing well in terms of functional mobility per above. Able to perform curb step ups today with FWW and CGA to mimic home set-up. Reporting he is no longer having pain at rest in L shoulder, believed to be combination of Ktape, topical pain reliever, ice, ex, and manual therapy; which is an improvement from previous days. Still with significantly limited AROM of L shoulder t/o.     Other Barriers to Discharge (DME, Family Training, etc): [facility]      "

## 2021-01-09 NOTE — PLAN OF CARE
RN: Pt A/O x4, VSS. Pt is able to make needs known. Rested in bed for entire of the shift, turned/repositioned with staff assistance. Pt has good appetite. Craig I place and patent, urine color clear/yellow. Continue to have aching pain in left shoulder, PRN med given with good effects. Continue with POC.         Patient's most recent vital signs are:     Vital signs:  BP: 134/75  Temp: 98.1  HR: 76  RR: 20  SpO2: 93 %     Patient doesn' have new respiratory symptoms.  Patient doesn't have new sore throat.  Patient doesn't have a fever greater than 99.5.

## 2021-01-10 ENCOUNTER — APPOINTMENT (OUTPATIENT)
Dept: SPEECH THERAPY | Facility: CLINIC | Age: 66
End: 2021-01-10
Payer: COMMERCIAL

## 2021-01-10 ENCOUNTER — APPOINTMENT (OUTPATIENT)
Dept: PHYSICAL THERAPY | Facility: CLINIC | Age: 66
End: 2021-01-10
Payer: COMMERCIAL

## 2021-01-10 ENCOUNTER — APPOINTMENT (OUTPATIENT)
Dept: OCCUPATIONAL THERAPY | Facility: CLINIC | Age: 66
End: 2021-01-10
Payer: COMMERCIAL

## 2021-01-10 PROCEDURE — 97140 MANUAL THERAPY 1/> REGIONS: CPT | Mod: GP | Performed by: PHYSICAL THERAPIST

## 2021-01-10 PROCEDURE — 97130 THER IVNTJ EA ADDL 15 MIN: CPT | Performed by: SPEECH-LANGUAGE PATHOLOGIST

## 2021-01-10 PROCEDURE — 97535 SELF CARE MNGMENT TRAINING: CPT | Mod: GO

## 2021-01-10 PROCEDURE — 250N000011 HC RX IP 250 OP 636: Performed by: PHYSICAL MEDICINE & REHABILITATION

## 2021-01-10 PROCEDURE — 250N000013 HC RX MED GY IP 250 OP 250 PS 637: Performed by: INTERNAL MEDICINE

## 2021-01-10 PROCEDURE — 97129 THER IVNTJ 1ST 15 MIN: CPT | Performed by: SPEECH-LANGUAGE PATHOLOGIST

## 2021-01-10 PROCEDURE — 128N000003 HC R&B REHAB

## 2021-01-10 PROCEDURE — 250N000013 HC RX MED GY IP 250 OP 250 PS 637: Performed by: PHYSICIAN ASSISTANT

## 2021-01-10 PROCEDURE — 250N000011 HC RX IP 250 OP 636: Performed by: PHYSICIAN ASSISTANT

## 2021-01-10 PROCEDURE — 97110 THERAPEUTIC EXERCISES: CPT | Mod: GP | Performed by: PHYSICAL THERAPIST

## 2021-01-10 PROCEDURE — 99231 SBSQ HOSP IP/OBS SF/LOW 25: CPT | Performed by: STUDENT IN AN ORGANIZED HEALTH CARE EDUCATION/TRAINING PROGRAM

## 2021-01-10 PROCEDURE — 97530 THERAPEUTIC ACTIVITIES: CPT | Mod: GP | Performed by: PHYSICAL THERAPIST

## 2021-01-10 RX ADMIN — FLUTICASONE PROPIONATE 1 SPRAY: 50 SPRAY, METERED NASAL at 09:08

## 2021-01-10 RX ADMIN — SENNOSIDES AND DOCUSATE SODIUM 1 TABLET: 8.6; 5 TABLET ORAL at 09:09

## 2021-01-10 RX ADMIN — MELATONIN TAB 3 MG 3 MG: 3 TAB at 21:16

## 2021-01-10 RX ADMIN — LEVETIRACETAM 1000 MG: 500 TABLET, FILM COATED ORAL at 09:08

## 2021-01-10 RX ADMIN — HEPARIN SODIUM 5000 UNITS: 5000 INJECTION, SOLUTION INTRAVENOUS; SUBCUTANEOUS at 21:17

## 2021-01-10 RX ADMIN — ACETAMINOPHEN 650 MG: 325 TABLET, FILM COATED ORAL at 12:16

## 2021-01-10 RX ADMIN — ACETAMINOPHEN 650 MG: 325 TABLET, FILM COATED ORAL at 16:11

## 2021-01-10 RX ADMIN — CEFTRIAXONE SODIUM 2 G: 2 INJECTION, POWDER, FOR SOLUTION INTRAMUSCULAR; INTRAVENOUS at 18:51

## 2021-01-10 RX ADMIN — ACETAMINOPHEN 650 MG: 325 TABLET, FILM COATED ORAL at 21:16

## 2021-01-10 RX ADMIN — Medication: at 09:09

## 2021-01-10 RX ADMIN — FLUTICASONE PROPIONATE 1 SPRAY: 50 SPRAY, METERED NASAL at 21:17

## 2021-01-10 RX ADMIN — LEVETIRACETAM 1000 MG: 500 TABLET, FILM COATED ORAL at 21:16

## 2021-01-10 RX ADMIN — OMEPRAZOLE 20 MG: 20 CAPSULE, DELAYED RELEASE ORAL at 09:08

## 2021-01-10 RX ADMIN — Medication: at 15:56

## 2021-01-10 RX ADMIN — SENNOSIDES AND DOCUSATE SODIUM 1 TABLET: 8.6; 5 TABLET ORAL at 21:16

## 2021-01-10 RX ADMIN — CEFTRIAXONE SODIUM 2 G: 2 INJECTION, POWDER, FOR SOLUTION INTRAMUSCULAR; INTRAVENOUS at 05:32

## 2021-01-10 RX ADMIN — ACETAMINOPHEN 650 MG: 325 TABLET, FILM COATED ORAL at 05:31

## 2021-01-10 RX ADMIN — HEPARIN SODIUM 5000 UNITS: 5000 INJECTION, SOLUTION INTRAVENOUS; SUBCUTANEOUS at 09:08

## 2021-01-10 NOTE — PLAN OF CARE
Discharge Planner Post-Acute Rehab OT:      Discharge Plan: home with spouse, HH vs OP OT pending progress     Precautions: fall, L shoulder pain     Current Status:  ADLs: CGA transfers and mobility with fww, able to amb in room and room <> shower room. Pt requiring Min A UB dressing, Max A LB dressing, CGA toileting, and SBA grooming. Declining full shower today - plan to complete tomorrow. Trialed LUE sling but not alleviating pain, pt able to functionally incorporate LUE during ADLs and WB through fww.  IADLs: was IND at baseline  Vision/Cognition: Per pt cognition has significantly improved since AMS, scored 24/30 on MoCA and will continue to address for return to IADLs     Assessment: Progressing with ADLs and functional mobility. LUE pain and activity tolerance improved from previous day. Collaboration with PT to recommend K-tape, topical pain reliever, ice, ROM, and scapular/extension strengthening. Pt will benefit from ongoing skilled OT intervention to reach Mod IND ADLs and simple IADLs. ELOS 1/21/21, however may be able to discharge sooner if continues to progress quickly.     Other Barriers to Discharge (DME, Family Training, etc):   Spouse is RN and able to provide assistance as needed.  Equipment needs TBD pending progress: shower chair, fww, bed wedge.

## 2021-01-10 NOTE — PLAN OF CARE
"Discharge Planner Post-Acute Rehab PT:      Discharge Plan: home with supervision from wife, HH vs OP PT pending progress with therapy on ARU. Consider/leaning towards OP to address L shoulder pain in conjunction.     Precautions: falls, L shoulder (pain in supraspinatus/upper trap)     Current Status:  Transfer: STS, FWW, SBA  Bed Mobility: CGA on therapy mat (flat and no rail), but does require cues for sequencing supine>L SL>sit  Gait: amb 175', FWW, SBA  Stairs: up/down 3x6\" steps, B rails, CGA. Able to safely perform 6\" curb step up with FWW and SBA/CGA  Balance: Fair static and dynamic standing balance. TOMAS 1/9/21: 31/56     Assessment:  Pt continues to be progressing well in terms of functional mobility per above. Able to perform curb step ups today with FWW and CGA/SBA to mimic home set-up. Reporting he is no longer having pain at rest in L shoulder, believed to be combination of Ktape, topical pain reliever, ice, ex, and manual therapy; which is an improvement from previous days. Still with significantly limited AROM of L shoulder t/o.     Other Barriers to Discharge (DME, Family Training, etc): [facility]   "

## 2021-01-10 NOTE — PROGRESS NOTES
[facility]   Acute Rehabilitation Unit  Daily progress note    INTERVAL HISTORY  Patient seen this morning in room. No acute events overnight. Denies chest pain, shortness of breath, fevers, or chills, abdominal pain or headache. No other concerns this morning.    Functionally,  With SLP, within normal limits for language skills, mild cognitive communication deficits. Scored 59th percentile for reasoning, min assist for more complex reasoning  With OT, below baseline with ADLs/IADLs. Left shoulder weakness and deconditioning. SBA for safety with STS transfers using grab bar from s/c to shower chair. Mod assist for bathing tasks.  With PT, able to perform curb step ups today with FWW and CGA.       MEDICATIONS  Scheduled:    cefTRIAXone  2 g Intravenous Q12H     fluticasone  1 spray Both Nostrils BID     heparin ANTICOAGULANT  5,000 Units Subcutaneous Q12H     levETIRAcetam  1,000 mg Oral BID     omeprazole  20 mg Oral Daily     senna-docusate  1 tablet Oral BID     sodium chloride (PF)  10 mL Intracatheter Q8H        PRN:  acetaminophen, bisacodyl, lidocaine 4%, lidocaine (buffered or not buffered), melatonin, menthol (Topical Analgesic) 2.5%, polyethylene glycol, sodium chloride (PF)       PHYSICAL EXAM  Patient Vitals for the past 24 hrs:   BP Temp Temp src Pulse Resp SpO2   01/10/21 1500 118/81 98.8  F (37.1  C) Oral 86 18 94 %   01/10/21 0809 108/65 97.9  F (36.6  C) Oral 86 18 95 %       GEN: NAD, pleasant and cooperative  HEENT: NC/AT  CVS: RRR, S1+S2, no m/r/g  PULM: CTA b/l, no w/r/r  ABD: Soft, NT, ND, bowel sounds present  : Craig catheter in place, draining clear yellow urine  EXT: No LE edema or calf tenderness b/l.   Neuro: Answers appropriately, follows commands.      LABS  CBC RESULTS:   Recent Labs   Lab Test 01/07/21  0854   WBC 18.0*   RBC 4.47   HGB 13.1*   HCT 40.5   MCV 91   MCH 29.3   MCHC 32.3   RDW 13.7          Last Comprehensive Metabolic  Panel:  Sodium   Date Value Ref Range Status   01/07/2021 137 133 - 144 mmol/L Final     Potassium   Date Value Ref Range Status   01/07/2021 3.9 3.4 - 5.3 mmol/L Final     Chloride   Date Value Ref Range Status   01/07/2021 106 94 - 109 mmol/L Final     Carbon Dioxide   Date Value Ref Range Status   01/07/2021 27 20 - 32 mmol/L Final     Anion Gap   Date Value Ref Range Status   01/07/2021 4 3 - 14 mmol/L Final     Glucose   Date Value Ref Range Status   01/07/2021 118 (H) 70 - 99 mg/dL Final     Urea Nitrogen   Date Value Ref Range Status   01/07/2021 22 7 - 30 mg/dL Final     Creatinine   Date Value Ref Range Status   01/07/2021 0.82 0.66 - 1.25 mg/dL Final     GFR Estimate   Date Value Ref Range Status   01/07/2021 >90 >60 mL/min/[1.73_m2] Final     Comment:     Non  GFR Calc  Starting 12/18/2018, serum creatinine based estimated GFR (eGFR) will be   calculated using the Chronic Kidney Disease Epidemiology Collaboration   (CKD-EPI) equation.       Calcium   Date Value Ref Range Status   01/07/2021 8.2 (L) 8.5 - 10.1 mg/dL Final       Recent Labs   Lab 01/07/21  0854 01/07/21  0321 01/06/21  2214 01/06/21  1659 01/06/21  1105 01/06/21  0948 01/06/21  0800 01/06/21  0356 01/05/21  0837 01/05/21  0837 01/04/21  0556 01/04/21  0556   *  --   --   --   --  115*  --   --   --  79  --  145*   BGM  --  108* 131* 109* 106*  --  117* 110*   < >  --    < >  --     < > = values in this interval not displayed.       IMPRESSION/PLAN:  Stevie Dotson is a 65 year old man who presented with altered mental status and fever diagnosed with meningitis admitted to acute rehab 1/7/21 for ongoing rehabilitation and medical management.      Admission to acute inpatient rehab bacterial meningitis    --Vitals stable. No lab today.  --Continue ongoing medical management.  --Continue therapies and plan of care.    Carmelita Duncan MD  Physical Medicine & Rehabilitation    I spent a total of 15 minutes face-to-face  and managing the care of the patient. Over 50% of my time on the unit was spent counseling the patient and coordinating care. See note for details.

## 2021-01-10 NOTE — PLAN OF CARE
Discharge Planner Post-Acute Rehab OT:     Discharge Plan: home with spouse, HH vs OP OT pending progress    Precautions: fall, L shoulder pain    Current Status:  ADLs: CGA transfers and mobility with FWW at bedside.  Pt requiring Min A UB dressing, Max A LB dressing, SBA for STS transfer using grab bar from w/c <> shower chair. Mod A for completion with bathing tasks. Used w/c for transport d/t time constraint.  IADLs: was IND at baseline  Vision/Cognition: Per pt cognition has significantly improved since AMS, scored 24/30 on MoCA and will continue to address for return to IADLs    Assessment: used w/c for transport d/t time constraint. SBA for safety with STS transfer using grab bar from w/c <> shower chair. Mod A for completion with bathing tasks. pt seems agreeable and tolerates ADL session well. Progressing well towards LTG.     Other Barriers to Discharge (DME, Family Training, etc):   Spouse is RN and able to provide assistance as needed.  Equipment needs TBD pending progress: shower chair, fww, bed wedge.

## 2021-01-10 NOTE — PROGRESS NOTES
21   Living Arrangements   People in home spouse   CM/SW Discharge Planning   Anticipated Discharge Disposition (CM/SW) Home;Home Care;Home Infusion;Outpatient Rehab (PT, OT, SLP, Cardiac or Pulmonary)   Final Resources   Equipment Currently Used at Home none     Social Work: Initial Assessment with Discharge Plan    Patient Name: Stevie Dotson  : 1955  Age: 65 year old  MRN: [id number]  Completed assessment with: patient  Admitted to ARU: 21    Presenting Information   Date of SW assessment: 21  Health Care Directive: none  Primary Health Care Agent: next-of-kin (wife) would be surrogate decision-maker if necessary  Secondary Health Care Agent: n/a  Living Situation: lives with   Previous Functional Status: independent  Patient and family understanding of hospitalization: yes  Cultural/Language/Spiritual Considerations: speaks English,  visits    Physical Health  Reason for admission: acute infectious encephalopathy due to bacterial meningitis, IV antibiotics for 2 weeks, hypoglycemia, tube-feeding started 21    Provider Information   Primary Care Physician: Dr[facility]ust[facility]guin at [facility]    Mental Health/Chemical Dependency:   Diagnosis: insomnia-melatonin  Alcohol/Tobacco/Narcotics: 12 beers per week  Support/Services in Place: n/a  Services Needed/Recommended: health psychologist available if interested during stay  Sexuality/Intimacy: no concerns    Support System  Marital Status: ; wife-Betzaida  Family support: son-Rahul, son-Rocael    Community Resources  Current in home services: none  Previous services: none    Financial/Employment/Education  Employment Status: retired; former   Income Source: Social Security [facility]  Education: high school,   Financial Concerns: no  Insurance: [facility] Medicare    Discharge Plan   Patient and family discharge goal: return home with wife and recommended services (home infusion, home therapy or  outpatient therapy)  Provided education on discharge plan: yes  Patient agreeable to discharge plan: to be determined  Provided education and attained signature for Medicare IM and IRF Patient Rights and Privacy Information provided to patient : yes  Provided patient with [facility] [place] Resources: n/a  Barriers to discharge: none identified    Discharge Recommendations   Disposition: return home with wife and recommended services (home infusion, home therapy or outpatient therapy)  Transportation Needs: family can provide  Name of Transportation Company and Phone: n/a    Additional comments   D:  See H&P for full medical background.  I:  Met with patient to complete assessment and social work consult.  A:  Patient is doing okay.  Supportive family who is involved.    P:  SW will follow and assist as needed.    Please invite to Care Conference:  Betzaida (wife)      DIAN Stevenson  Weekend   Phone: [phone]  Pager: [phone]

## 2021-01-10 NOTE — PLAN OF CARE
Alert and verbalizes his needs. Participating in therapies. IV antibiotics every 12 hours. Continues to have left shoulder pain-using tylenol, reuben-pendleton ointment, and therapy has been wrapping with special tape. Good appetite. Craig patent an draining clear, yellow urine. Pleasant.

## 2021-01-10 NOTE — PLAN OF CARE
Patient appears sleeping,resting in bed during rounds. Requested not to be bother at night. Patient will call if he needed pain medication. Patient's ervin intact and patent, draining clear yellow urine. No respiratory distress noted. Will continue with current plan of care.        Patient's most recent vital signs are:     Vital signs:  BP: 122/68  Temp: 97.5  HR: 76  RR: 18  SpO2: 94 %     Patient does not have new respiratory symptoms.  Patient does not have new sore throat.  Patient does not have a fever greater than 99.5.

## 2021-01-10 NOTE — PLAN OF CARE
Discharge Planner Post-Acute Rehab SLP:      Discharge Plan:home with assist     Precautions: fall     Current Status:  Communication: WNL  Cognition: mild deficits  Swallow: no problems reported     Assessment: SLP: scored 59th percentile on test for reasoning (WJ-R analysis synthesis) min assist for moderate complex reasoning/planning   Other Barriers to Discharge (Family Training, etc): TBD          [facility]  ED Nurse Handoff Report    Susanne Escoto is a 75 year old female   ED Chief complaint: Abdominal Pain  . ED Diagnosis:   Final diagnoses:   Diverticulitis of colon     Allergies: No Known Allergies    Code Status: Full Code  Activity level - Baseline/Home:  Independent. Activity Level - Current:   Stand by Assist. Lift room needed: No. Bariatric: No   Needed: No   Isolation: No. Infection: Not Applicable.     Vital Signs:   Vitals:    07/27/19 2116 07/27/19 2200 07/27/19 2215   BP: 108/78 128/84 134/80   Pulse: 108 94 101   Resp: 18     Temp: 98.9  F (37.2  C)     TempSrc: Oral     SpO2: 95% 96% 99%       Cardiac Rhythm:  ,      Pain level: 0-10 Pain Scale: 0  Patient confused: No. Patient Falls Risk: Yes.   Elimination Status: Has voided   Patient Report - Initial Complaint: Abdominal Pain. Focused Assessment: bilateral lower quadrant abdominal pain.   Tests Performed:   Labs Ordered and Resulted from Time of ED Arrival Up to the Time of Departure from the ED   CBC WITH PLATELETS DIFFERENTIAL - Abnormal; Notable for the following components:       Result Value    WBC 13.6 (*)     Absolute Neutrophil 11.8 (*)     All other components within normal limits   BASIC METABOLIC PANEL - Abnormal; Notable for the following components:    Potassium 3.1 (*)     Glucose 110 (*)     Urea Nitrogen 31 (*)     Creatinine 1.32 (*)     GFR Estimate 39 (*)     GFR Estimate If Black 45 (*)     All other components within normal limits     No orders to display     Treatments provided: See MAR  Family Comments: Family member at bedside  OBS brochure/video discussed/provided to patient:  Yes  ED Medications:   Medications   0.9% sodium chloride BOLUS (1,000 mLs Intravenous New Bag 7/27/19 2208)   ertapenem (INVanz) 1 g vial to attach to  mL bag (1 g Intravenous New Bag 7/27/19 2208)     Drips infusing:  Yes  For the majority of the shift, the patient's behavior Green. Interventions  performed were monitor.     Severe Sepsis OR Septic Shock Diagnosis Present: No      ED Nurse Name/Phone Number: Saqib Qiu,   10:31 PM    RECEIVING UNIT ED HANDOFF REVIEW    Above ED Nurse Handoff Report was reviewed: Yes  Reviewed by: Michael Strickland on July 27, 2019 at 10:44 PM

## 2021-01-10 NOTE — PROGRESS NOTES
Individualized Overall Plan Of Care (IOPOC)      Rehab diagnosis/Impairment Group Code: Brain dysfunction 02.1 non-traumatic due to acute infectious encephalopathy due to bacterial meningitis  Meningitis     Expected functional outcome: Anticipate improvement to mod I level with mobility and ADLs. Will most likely need supervision with iADLs at least initially after discharge.    Clinical Impression Comments:     Mobility: Jones will benefit from PT to progress strength, balance, coordination, and treat L shld (possible orthopedic injury + neuropathic pain) to improve mobility to amb with FWW mod I in room prior to d/c home.    ADL: Pt below baseline of IND with ADLs/driving/IADLs d/t encephalopathy, L shoulder injury/weakness, deconditioning, new low back pain and is motivated to participate in OT to progress independence. Requires OT to address identified deficits in order for pt to return home IND with ADLs and mobility. Has strong assist from spouse who is RN but still working    Communication/Cognition/Swallow: SLP: pt was seen for communication/cognitive linguistic evaluation.  Noting language skills WNL.  Thus far noting at least mild deficits for cognitive communication abilities for reasoning, complex attention and memory, pt appears below baseline and would benfit from skilled intervention to improve comnmunication skills for increased safety and independence to return home     Intensity of therapy:   PT 60 minutes, Daily(60 min daily), for 14 days  OT 60 minutes, Daily, for 14 days  SLP 60 minutes, daily, for estimated 7-10 days      Education stroke, diabetes and IV antibiotic infusion  Neuropsychology Testing: No        Medical Prognosis:   Patient is at risk for more medical complications. Currently on iv antibiotics and keppra. Good rehab prognosis.  Anticipate improvement to mod I level with mobility and ADLs. Will most likely need supervision with iADLs at least initially after discharge.        Physician summary statement:   Patient is on iv antibiotics and Keppra. He is at risk of more medical complications. Functional deficits include left hemiparesis, possible cognitive/linguistic deficits and likely neurogenic bowel and bladder.   He was I with all aspects of his life prior to admission. Currently requires min to mod A for transfers, CGA to min A for standing. On regular diet.    Anticipate improvement to mod I level with mobility and ADLs. Will most likely need supervision with iADLs at least initially after discharge.     Discharge destination: prior home  Discharge rehabilitation needs: PT, OT and SLP      Estimated length of stay: 14 days      Rehabilitation Physician Carmelita Duncan MD

## 2021-01-10 NOTE — PLAN OF CARE
FOCUS/GOAL  Bladder management, Medication management, Medical management, and Mobility    ASSESSMENT, INTERVENTIONS AND CONTINUING PLAN FOR GOAL:  A/Ox4. Denies pain, SOB, CP, nausea, dizziness. On RA. Craig in place, draining adequately. LBM today, uses bedside commode. Ax1-2 stand pivot. Regular diet, takes pills whole, good appetite. R PICC SL, IV ABX infused this evening. Pt able to make needs known and call light within reach. Continue POC     /68 (BP Location: Left arm)   Pulse 76   Temp 97.5  F (36.4  C) (Oral)   Resp 18   Wt 107 kg (236 lb)   SpO2 94%   BMI 31.14 kg/m

## 2021-01-11 ENCOUNTER — APPOINTMENT (OUTPATIENT)
Dept: SPEECH THERAPY | Facility: CLINIC | Age: 66
End: 2021-01-11
Payer: COMMERCIAL

## 2021-01-11 ENCOUNTER — APPOINTMENT (OUTPATIENT)
Dept: OCCUPATIONAL THERAPY | Facility: CLINIC | Age: 66
End: 2021-01-11
Payer: COMMERCIAL

## 2021-01-11 ENCOUNTER — APPOINTMENT (OUTPATIENT)
Dept: PHYSICAL THERAPY | Facility: CLINIC | Age: 66
End: 2021-01-11
Payer: COMMERCIAL

## 2021-01-11 LAB — LAB SCANNED RESULT: NORMAL

## 2021-01-11 PROCEDURE — 97116 GAIT TRAINING THERAPY: CPT | Mod: GP

## 2021-01-11 PROCEDURE — 97110 THERAPEUTIC EXERCISES: CPT | Mod: GP

## 2021-01-11 PROCEDURE — 250N000011 HC RX IP 250 OP 636: Performed by: PHYSICAL MEDICINE & REHABILITATION

## 2021-01-11 PROCEDURE — 250N000013 HC RX MED GY IP 250 OP 250 PS 637: Performed by: INTERNAL MEDICINE

## 2021-01-11 PROCEDURE — 97110 THERAPEUTIC EXERCISES: CPT | Mod: GO

## 2021-01-11 PROCEDURE — 97140 MANUAL THERAPY 1/> REGIONS: CPT | Mod: GP

## 2021-01-11 PROCEDURE — 97535 SELF CARE MNGMENT TRAINING: CPT | Mod: GO

## 2021-01-11 PROCEDURE — 99232 SBSQ HOSP IP/OBS MODERATE 35: CPT | Mod: GC | Performed by: PHYSICAL MEDICINE & REHABILITATION

## 2021-01-11 PROCEDURE — 97130 THER IVNTJ EA ADDL 15 MIN: CPT | Performed by: SPEECH-LANGUAGE PATHOLOGIST

## 2021-01-11 PROCEDURE — 128N000003 HC R&B REHAB

## 2021-01-11 PROCEDURE — 250N000013 HC RX MED GY IP 250 OP 250 PS 637: Performed by: PHYSICIAN ASSISTANT

## 2021-01-11 PROCEDURE — 97129 THER IVNTJ 1ST 15 MIN: CPT | Performed by: SPEECH-LANGUAGE PATHOLOGIST

## 2021-01-11 PROCEDURE — 96372 THER/PROPH/DIAG INJ SC/IM: CPT | Performed by: PHYSICIAN ASSISTANT

## 2021-01-11 PROCEDURE — 250N000011 HC RX IP 250 OP 636: Performed by: PHYSICIAN ASSISTANT

## 2021-01-11 RX ADMIN — SENNOSIDES AND DOCUSATE SODIUM 1 TABLET: 8.6; 5 TABLET ORAL at 07:51

## 2021-01-11 RX ADMIN — ACETAMINOPHEN 650 MG: 325 TABLET, FILM COATED ORAL at 16:12

## 2021-01-11 RX ADMIN — SENNOSIDES AND DOCUSATE SODIUM 1 TABLET: 8.6; 5 TABLET ORAL at 09:20

## 2021-01-11 RX ADMIN — ACETAMINOPHEN 650 MG: 325 TABLET, FILM COATED ORAL at 06:51

## 2021-01-11 RX ADMIN — CEFTRIAXONE SODIUM 2 G: 2 INJECTION, POWDER, FOR SOLUTION INTRAMUSCULAR; INTRAVENOUS at 17:04

## 2021-01-11 RX ADMIN — HEPARIN SODIUM 5000 UNITS: 5000 INJECTION, SOLUTION INTRAVENOUS; SUBCUTANEOUS at 07:51

## 2021-01-11 RX ADMIN — ACETAMINOPHEN 650 MG: 325 TABLET, FILM COATED ORAL at 20:25

## 2021-01-11 RX ADMIN — HEPARIN SODIUM 5000 UNITS: 5000 INJECTION, SOLUTION INTRAVENOUS; SUBCUTANEOUS at 20:25

## 2021-01-11 RX ADMIN — CEFTRIAXONE SODIUM 2 G: 2 INJECTION, POWDER, FOR SOLUTION INTRAMUSCULAR; INTRAVENOUS at 06:42

## 2021-01-11 RX ADMIN — FLUTICASONE PROPIONATE 1 SPRAY: 50 SPRAY, METERED NASAL at 07:51

## 2021-01-11 RX ADMIN — ACETAMINOPHEN 650 MG: 325 TABLET, FILM COATED ORAL at 12:04

## 2021-01-11 RX ADMIN — OMEPRAZOLE 20 MG: 20 CAPSULE, DELAYED RELEASE ORAL at 07:51

## 2021-01-11 RX ADMIN — LEVETIRACETAM 1000 MG: 500 TABLET, FILM COATED ORAL at 09:20

## 2021-01-11 RX ADMIN — LEVETIRACETAM 1000 MG: 500 TABLET, FILM COATED ORAL at 07:51

## 2021-01-11 RX ADMIN — MELATONIN TAB 3 MG 3 MG: 3 TAB at 20:35

## 2021-01-11 RX ADMIN — LEVETIRACETAM 1000 MG: 500 TABLET, FILM COATED ORAL at 20:25

## 2021-01-11 RX ADMIN — Medication: at 07:54

## 2021-01-11 RX ADMIN — FLUTICASONE PROPIONATE 1 SPRAY: 50 SPRAY, METERED NASAL at 09:20

## 2021-01-11 RX ADMIN — SENNOSIDES AND DOCUSATE SODIUM 1 TABLET: 8.6; 5 TABLET ORAL at 20:25

## 2021-01-11 RX ADMIN — FLUTICASONE PROPIONATE 1 SPRAY: 50 SPRAY, METERED NASAL at 20:30

## 2021-01-11 NOTE — PLAN OF CARE
"Discharge Planner Post-Acute Rehab PT:      Discharge Plan: home with supervision from wife, OP PT.     Precautions: falls, L shoulder pain     Current Status:  Transfer: STS, FWW, SBA  Bed Mobility: CGA on therapy mat (flat and no rail), but does require cues for sequencing supine>L SL>sit  Gait: amb 175', FWW, SBA  Stairs: up/down 3x6\" steps, B rails, CGA. Able to safely perform 6\" curb step up with FWW and SBA/CGA  Balance: Fair static and dynamic standing balance. TOMAS 1/9/21: 31/56     Assessment:  Continues to report improved pain in L shld with post and inf GH mobs, isometric strengthening, ice and K tape. Will continue to treat acute orthopedic pain with this plan. Recommend discharge 1/18/21.     Other Barriers to Discharge (DME, Family Training, etc): [facility]  "

## 2021-01-11 NOTE — PLAN OF CARE
Individualized Overall Plan Of Care (IOPOC)      Rehab diagnosis/Impairment Group Code: Brain dysfunction 02.1 non-traumatic due to acute infectious encephalopathy due to bacterial meningitis  Meningitis     Expected functional outcome: Mod I for ambulation, mobility, ADLs     Clinical Impression Comments: Improving    Mobility: Jones will benefit from PT to progress strength, balance, coordination, and treat L shld (possible orthopedic injury + neuropathic pain) to improve mobility to amb with FWW mod I in room prior to d/c home.    ADL: Pt below baseline of IND with ADLs/driving/IADLs d/t encephalopathy, L shoulder injury/weakness, deconditioning, new low back pain and is motivated to participate in OT to progress independence. Requires OT to address identified deficits in order for pt to return home IND with ADLs and mobility. Has strong assist from spouse who is RN but still working    Communication/Cognition/Swallow: SLP: pt was seen for communication/cognitive linguistic evaluation.  Noting language skills WNL.  Thus far noting at least mild deficits for cognitive communication abilities for reasoning, complex attention and memory, pt appears below baseline and would benfit from skilled intervention to improve comnmunication skills for increased safety and independence to return home     Intensity of therapy:   PT 60 minutes, Daily(60 min daily), for 14 days  OT 60 minutes, Daily, for 14 days  SLP 60 minutes, daily, for estimated 7-10 days    Orthotics None  Education PICC line care and IV antibiotic infusion  Neuropsychology Testing: No  Other:  None      Medical Prognosis: Good      Physician summary statement: Improving and likely will be able to move up tentative discharge date, however would need to look into completing IV antibiotics at home (end date 1/20).  Care coordinator involved.    Discharge destination: prior home  Discharge rehabilitation needs: outpatient, PT, OT and SLP?      Estimated length of  stay: 14 days      Rehabilitation Physician Rebecca Ricardo MD

## 2021-01-11 NOTE — PLAN OF CARE
FOCUS/GOAL  Bladder management, Medication management, Medical management, and Mobility    ASSESSMENT, INTERVENTIONS AND CONTINUING PLAN FOR GOAL:  A/Ox4. Denies SOB, CP, nausea, dizziness. L should pain controlled with PRN tylenol x2, bengay x1 and tape. On RA. Craig in place, draining adequately. Continent BM today in bathroom. Ax1 with walker/gb. Regular diet, takes pills whole, good appetite. Single lumen Midline SL, IV ABX infused this evening. Midline catheter dressing changed. Pt able to make needs known and call light within reach. Continue POC     /81 (BP Location: Left arm)   Pulse 86   Temp 98.8  F (37.1  C) (Oral)   Resp 18   Wt 107 kg (236 lb)   SpO2 94%   BMI 31.14 kg/m

## 2021-01-11 NOTE — PLAN OF CARE
Discharge Planner Post-Acute Rehab SLP:      Discharge Plan:home with assist     Precautions: fall     Current Status:  Communication: WNL  Cognition: mild deficits  Swallow: no problems reported     Assessment: worked on moderate complex activities for processing speed, deductive reasoning, attention detail and working memory, mild difficulty intermittent cueing with increased accuracy over time/repetition. Reduced intensity to 30 minutes daily  Other Barriers to Discharge (Family Training, etc): TBD

## 2021-01-11 NOTE — PLAN OF CARE
Discharge Planner Post-Acute Rehab OT:      Discharge Plan: home with spouse, HH vs OP OT pending progress     Precautions: fall, L shoulder pain     Current Status:  ADLs: CGA-SBA with FWW transfers and mobility.  Pt requiring Min A UB dressing, Max A LB dressing, SBA shower transfer, mod A LB dressing, SBA standing for h/g  Vision/Cognition: Per pt cognition has significantly improved since AMS, scored 24/30 on MoCA and will continue to address for return to IADLs     Assessment: Improved alertness, speech, and overall function compared to Friday. CGA-SBA with mobility room<>therapy gym and tolerated AAROM supine stretching of LUE without pain. Collaborating with PT to decrease L shoulder pain and improve LUE functional use and strength for ADLs. Today could tolerate mild open chained activities in LUE (washing hands, reaching for arm rests) with decreased pain     Other Barriers to Discharge (DME, Family Training, etc):   Spouse is RN, able to provide assist as needed. May need shower chair, FWW, bed wedge

## 2021-01-11 NOTE — PLAN OF CARE
Pt up with assist x1 and walker. Pain to L shoulder - bengay applied PRN. Tylenol also given for pain. Craig patent - in place for urinary retention. VSS. Midline patent and saline locked. Pt able to make needs known.

## 2021-01-11 NOTE — PROGRESS NOTES
"  [facility]   Acute Rehabilitation Unit  Daily progress note    INTERVAL HISTORY  Stevie seen and examined at the bedside sitting comfortably on bed.  He said that his shoulder pain is getting better after therapy sessions.  Patient was able to walk with a walker with min assist in the hallway prior to seeing him this morning.  He denies any new complaints.    He denies any nausea, vomiting, headache, chest pain, shortness of breath, abdominal pain, difficulty urinating.    Functionally:  As per therapy notes  As per PT  Transfer: STS, FWW, SBA  Bed Mobility: CGA on therapy mat (flat and no rail), but does require cues for sequencing supine>L SL>sit  Gait: amb 175', FWW, SBA  Stairs: up/down 3x6\" steps, B rails, CGA. Able to safely perform 6\" curb step up with FWW and SBA/CGA  Balance: Fair static and dynamic standing balance. TOMAS 1/9/21: 31/56      MEDICATIONS  Scheduled:    cefTRIAXone  2 g Intravenous Q12H     fluticasone  1 spray Both Nostrils BID     heparin ANTICOAGULANT  5,000 Units Subcutaneous Q12H     levETIRAcetam  1,000 mg Oral BID     omeprazole  20 mg Oral Daily     senna-docusate  1 tablet Oral BID     sodium chloride (PF)  10 mL Intracatheter Q8H        PRN:  acetaminophen, bisacodyl, lidocaine 4%, lidocaine (buffered or not buffered), melatonin, menthol (Topical Analgesic) 2.5%, polyethylene glycol, sodium chloride (PF)       PHYSICAL EXAM  Patient Vitals for the past 24 hrs:   BP Temp Temp src Pulse Resp SpO2   01/11/21 0827 132/75 98.5  F (36.9  C) Oral 91 16 93 %   01/10/21 1500 118/81 98.8  F (37.1  C) Oral 86 18 94 %       GEN: Alert oriented, sitting comfortably in bed, not in acute distress.  HEENT: Neck supple, extraocular movement intact.  CVS: S1-S2 present, regular, rate, rhythm.  PULM: Clear to auscultation bilaterally, breathing comfortably on room air.  ABD: Nondistended, bowel sounds present.  : Craig catheter in place, draining clear yellow " urine  EXT: No LE edema or calf tenderness b/l.  +TTP over pectoralis and upper trapezius muscles.  +Hawkin's and Neer's.    Neuro: Answers appropriately, follows commands.  Psychomotor slowing.    LABS  CBC RESULTS:   Lab Results   Component Value Date    WBC 18.0 01/07/2021     Lab Results   Component Value Date    RBC 4.47 01/07/2021     Lab Results   Component Value Date    HGB 13.1 01/07/2021     Lab Results   Component Value Date    HCT 40.5 01/07/2021     Lab Results   Component Value Date    MCV 91 01/07/2021     Lab Results   Component Value Date    MCH 29.3 01/07/2021     Lab Results   Component Value Date    MCHC 32.3 01/07/2021     Lab Results   Component Value Date    RDW 13.7 01/07/2021     Lab Results   Component Value Date     01/07/2021         Last Comprehensive Metabolic Panel:  Sodium   Date Value Ref Range Status   01/07/2021 137 133 - 144 mmol/L Final     Potassium   Date Value Ref Range Status   01/07/2021 3.9 3.4 - 5.3 mmol/L Final     Chloride   Date Value Ref Range Status   01/07/2021 106 94 - 109 mmol/L Final     Carbon Dioxide   Date Value Ref Range Status   01/07/2021 27 20 - 32 mmol/L Final     Anion Gap   Date Value Ref Range Status   01/07/2021 4 3 - 14 mmol/L Final     Glucose   Date Value Ref Range Status   01/07/2021 118 (H) 70 - 99 mg/dL Final     Urea Nitrogen   Date Value Ref Range Status   01/07/2021 22 7 - 30 mg/dL Final     Creatinine   Date Value Ref Range Status   01/07/2021 0.82 0.66 - 1.25 mg/dL Final     GFR Estimate   Date Value Ref Range Status   01/07/2021 >90 >60 mL/min/[1.73_m2] Final     Comment:     Non  GFR Calc  Starting 12/18/2018, serum creatinine based estimated GFR (eGFR) will be   calculated using the Chronic Kidney Disease Epidemiology Collaboration   (CKD-EPI) equation.       Calcium   Date Value Ref Range Status   01/07/2021 8.2 (L) 8.5 - 10.1 mg/dL Final       Recent Labs   Lab 01/07/21  0854 01/07/21  0321 01/06/21  2214  01/06/21  1659 01/06/21  1105 01/06/21  0948 01/06/21  0800 01/06/21  0356 01/05/21  0837 01/05/21  0837   *  --   --   --   --  115*  --   --   --  79   BGM  --  108* 131* 109* 106*  --  117* 110*   < >  --     < > = values in this interval not displayed.       IMPRESSION/PLAN:  Stevie Dotson is a 65 year old man who presented with altered mental status and fever diagnosed with meningitis admitted to acute rehab 1/7/21 for ongoing rehabilitation and medical management.      Admission to acute inpatient rehab bacterial meningitis  Impairment group code: 02.1        1. PT, OT and SLP 60 minutes of each on a daily basis, in addition to rehab nursing and close management of physiatrist.       2. Impairment of ADL's: Noted to have impaired strength, impaired activity tolerance, and impaired cognition will benefit from ongoing OT with goal for MOD I with basic adls.      3. Impairment of mobility:  Noted to have impaired strength, and impaired activity tolerance will benefit from ongoing PT with goal for MOD I with basic mobility.         4. Impairment of cognition/language/swallow:  Noted to have impaired swallow, cognition, and vocal hoarseness will benefit from ongoing SLP with goal to improve ability to communicate basic needs.     Medical Conditions  #Strep pneumoniae meningitis-  # Encephalopathy-  presented with AMS and fever, Started on broad spectrum antimicrobials and steroids. CSF cultures & blood cultures positive for step pneumo. MRI/A showed decreased attenuation of R hemispheric vessels and evidence of purulent material in his ventricles and leptomeninges explains the presentation.   -weekly labs- per ID recs- (CBC w/ diff, BMP, SGOT)  -continue 2 additional weeks of ceftriaxone (from 1/6/21)  -continue PT/OT/SLP     #Possible seizures -Right hemispheric focal slowing and transient left arm stiffening at presentation worrisome for seizure, but no seizures or epileptiform discharges captured on  EEG  -continue keppra  -follow up neurology      #Hyperglycemia- Hgb a1C 6.4%  consistent with pre- diabetes, was on insulin/ssi when on steroids bg improved with completion of steroids  -routine monitoring with labs  -follow up pcp     # Leucocytosis- in setting of infection and 4 day course of steroids- now down trending WBC 18 1/7, CBC from this morning still pending.  -We will continue to monitor.     #Urinary retention (neurogenic bladder?)- with ervin catheter removed 1/5 with retention and replaced 1/6.   -plan for TOV ~ 1 week      #History of Alcohol Use- reportedly ~12 beers weekly per wife report patient reports a few beers on weeknds, no signs of withdrawal     #Polyneuropathy?: reports 20 year h/o paresthesia in feet (mostly toes and the plantar aspects). His symptoms have been clearly worse since hospitalization. Never had any work-up or formal diagnosis. Likely secondary to alcohol use vs small fiber given the very slow course. May benefit from basic blood work and NCS/EMG as outpatient after discharge.     #L shoulder pain  -Likely muscular vs. Impingement.  Will obtain XR to ensure no major bony pathology   -Encourage Tylenol, will add Icy Hot     1. Adjustment to disability:  Clinical psychology to eval and treat as needed  2. FEN: reg  3. Bowel: continent  4. Bladder: ervin-  5. DVT Prophylaxis: subcutaneous heparin  6. GI Prophylaxis: ppi  7. Code: full  8. Disposition: home  9. ELOS: ~14 days  10. Rehab prognosis:  good  11. Follow up Appointments on Discharge: neurology, pcp    Patient was seen and discussed with my staff physician Dr. Ricardo, who agrees with my assessment and plan.    Tavo Guillen   PGY 2  Physical Medicine and Rehabilitation  Pager: [phone]

## 2021-01-12 ENCOUNTER — APPOINTMENT (OUTPATIENT)
Dept: OCCUPATIONAL THERAPY | Facility: CLINIC | Age: 66
End: 2021-01-12
Payer: COMMERCIAL

## 2021-01-12 ENCOUNTER — APPOINTMENT (OUTPATIENT)
Dept: SPEECH THERAPY | Facility: CLINIC | Age: 66
End: 2021-01-12
Payer: COMMERCIAL

## 2021-01-12 ENCOUNTER — APPOINTMENT (OUTPATIENT)
Dept: PHYSICAL THERAPY | Facility: CLINIC | Age: 66
End: 2021-01-12
Payer: COMMERCIAL

## 2021-01-12 PROCEDURE — 250N000011 HC RX IP 250 OP 636: Performed by: PHYSICAL MEDICINE & REHABILITATION

## 2021-01-12 PROCEDURE — 97140 MANUAL THERAPY 1/> REGIONS: CPT | Mod: GP

## 2021-01-12 PROCEDURE — 97110 THERAPEUTIC EXERCISES: CPT | Mod: GP

## 2021-01-12 PROCEDURE — 999N000125 HC STATISTIC PATIENT MED CONFERENCE < 30 MIN: Performed by: SPEECH-LANGUAGE PATHOLOGIST

## 2021-01-12 PROCEDURE — 99233 SBSQ HOSP IP/OBS HIGH 50: CPT | Performed by: PHYSICAL MEDICINE & REHABILITATION

## 2021-01-12 PROCEDURE — 97130 THER IVNTJ EA ADDL 15 MIN: CPT | Performed by: SPEECH-LANGUAGE PATHOLOGIST

## 2021-01-12 PROCEDURE — 97535 SELF CARE MNGMENT TRAINING: CPT | Mod: GO

## 2021-01-12 PROCEDURE — 97129 THER IVNTJ 1ST 15 MIN: CPT | Performed by: SPEECH-LANGUAGE PATHOLOGIST

## 2021-01-12 PROCEDURE — 999N000125 HC STATISTIC PATIENT MED CONFERENCE < 30 MIN

## 2021-01-12 PROCEDURE — 250N000013 HC RX MED GY IP 250 OP 250 PS 637: Performed by: PHYSICIAN ASSISTANT

## 2021-01-12 PROCEDURE — 250N000013 HC RX MED GY IP 250 OP 250 PS 637: Performed by: PHYSICAL MEDICINE & REHABILITATION

## 2021-01-12 PROCEDURE — 999N000150 HC STATISTIC PT MED CONFERENCE < 30 MIN

## 2021-01-12 PROCEDURE — 97530 THERAPEUTIC ACTIVITIES: CPT | Mod: GO

## 2021-01-12 PROCEDURE — 250N000011 HC RX IP 250 OP 636: Performed by: PHYSICIAN ASSISTANT

## 2021-01-12 PROCEDURE — 250N000013 HC RX MED GY IP 250 OP 250 PS 637: Performed by: INTERNAL MEDICINE

## 2021-01-12 PROCEDURE — 128N000003 HC R&B REHAB

## 2021-01-12 RX ADMIN — CEFTRIAXONE SODIUM 2 G: 2 INJECTION, POWDER, FOR SOLUTION INTRAMUSCULAR; INTRAVENOUS at 06:13

## 2021-01-12 RX ADMIN — LEVETIRACETAM 1000 MG: 500 TABLET, FILM COATED ORAL at 21:00

## 2021-01-12 RX ADMIN — CEFTRIAXONE SODIUM 2 G: 2 INJECTION, POWDER, FOR SOLUTION INTRAMUSCULAR; INTRAVENOUS at 17:25

## 2021-01-12 RX ADMIN — OMEPRAZOLE 20 MG: 20 CAPSULE, DELAYED RELEASE ORAL at 06:14

## 2021-01-12 RX ADMIN — LEVETIRACETAM 1000 MG: 500 TABLET, FILM COATED ORAL at 08:23

## 2021-01-12 RX ADMIN — MELATONIN TAB 3 MG 3 MG: 3 TAB at 22:07

## 2021-01-12 RX ADMIN — FLUTICASONE PROPIONATE 1 SPRAY: 50 SPRAY, METERED NASAL at 08:24

## 2021-01-12 RX ADMIN — FLUTICASONE PROPIONATE 1 SPRAY: 50 SPRAY, METERED NASAL at 21:04

## 2021-01-12 RX ADMIN — SENNOSIDES AND DOCUSATE SODIUM 1 TABLET: 8.6; 5 TABLET ORAL at 21:00

## 2021-01-12 RX ADMIN — ACETAMINOPHEN 650 MG: 325 TABLET, FILM COATED ORAL at 18:12

## 2021-01-12 RX ADMIN — HEPARIN SODIUM 5000 UNITS: 5000 INJECTION, SOLUTION INTRAVENOUS; SUBCUTANEOUS at 08:24

## 2021-01-12 RX ADMIN — ACETAMINOPHEN 650 MG: 325 TABLET, FILM COATED ORAL at 14:07

## 2021-01-12 RX ADMIN — ACETAMINOPHEN 650 MG: 325 TABLET, FILM COATED ORAL at 22:07

## 2021-01-12 RX ADMIN — ACETAMINOPHEN 650 MG: 325 TABLET, FILM COATED ORAL at 06:22

## 2021-01-12 NOTE — PLAN OF CARE
Post Rounds Family Phone Call  Length of call: 15 min  Family involved: Spouse, Betzaida  Projected discharge date: 11/5 after therapy sessions in AM  Projected discharge location: home with spouse and OP OT/PT  Equipment needs: Shower chair spouse has ordered, FWW from [facility]  Other questions and misc: Spouse agreeable to new discharge date and reports she can assist with PICC line and administering antibiotic at home if needed (is RN and has experience with both). Educated spouse on compensatory strategies the pt is needed for balance deficits and L shoulder pain (only reaching for items with RUE, sitting for dressing, keeping L hand on FWW or countertop whenever possible to decrease pain when hanging, etc), spouse will help pt carryover strategies at home  Spouse requested dietician f/u with pt new nutritional options  Spouse requested MD f/u with pt re: recommended alcohol consumption  Sent page to both MD and dietician for f/u

## 2021-01-12 NOTE — PLAN OF CARE
Acute Rehab Care Conference/Team Rounds      Type: Team Rounds    Present: Dr. Rebecca Ricardo, Anna Varela PA, Roberto Carlos Rodrigues PT, Kacey Montano OT, Lucy Ashley SLP, Joy ROSENBERG, Sharmin Estrada RD, Dre Hdz and Izabella Hairston RN        Discharge Barriers/Treatment/Education    Rehab Diagnosis: Meningitis    Active Medical Co-morbidities/Prognosis: Encephalopathy, L shoulder pain, possible seizures, hyperglycemia, leukocytosis, urinary retention, possible polyneuropathy, history of alcohol use    Safety: alarms in use and audible for safety, pt able to use call light appropriately    Pain: pt has been c/o L shoulder and low back pain-using PRN acetaminophen and bengay gel for pain management as well as repositioning and cold packs with some relief    Medications, Skin, Tubes/Lines: takes pills whole with water, skin is CDI with no areas for concern at this time, midline catheter for IV abx- patent with dressing CDI    Swallowing/Nutrition:    Bowel/Bladder: Has experienced some constipation, taking bowel medications-has been able to be continent of bowel with LBM 1/10, ervin is patent    Psychosocial: Live at home with his wife. Wife is an RN. No mental health concerns. Drinks 12 beers per week. Has IV abx. RN CC looking into home infusion coverage so patient can go home 1/15 instead of 1/21.     ADLs/IADLs: Pt is progressing quickly with strength, endurance, and ADL IND using compensatory strategies for L shoulder pain/weakness. Pt requires SBA with all transfers, mobility, dressing, hygiene. Still need to work on toileting with FWW once ervin cath is removed. Plan for pt to discharge 1/15 mod I with wife assist for IADLs. Recommend OP OT, shower chair (wife is purchasing) and FWW issued from [facility]    Mobility: Improving L shoulder pain but still limited ROM and strength. Will continue to treat orthopaedically. Demo sup<>sit Sarah to L for trunk support and SBA to L, STS FWW SBA, amb  "175' FWW CGA, and navigating 3x6\" steps B rails CGA. Need to progress independence with 1/2 step to enter home with FWW. Recommend OP PT.    Cognition/Language:SP: pt doing well minimal cognitive linguistic deficits, improving, likely nearing baseline, does benefit from occasional cueing, increased processing time, decreased intensity to 30 minutes daily, likely no further SLP after discharge. May benefit from MAP    Community Re-Entry: Supervision amb limited community distances    Transportation: Family/friends to assist    Plan of Care and goals reviewed and updated.    Discharge Plan/Recommendations    Fall Precautions: continue    Overall plan for the patient:   Medically stable and will complete planned course of antibiotics on 1/20/21.  Alertness levels improving.  Is making excellent functional progress, now ambulating 175 ft with a walker and SBA.  Shoulder pain getting better but still present.  Close to Mod I for ADLs, Craig was a barrier but will be doing voiding trial today. Will plan to move up discharge date to 1/15/21 after full day of therapies.  Will need home IV abx arranged until 1/20/21.  Wife is an RN.  Care coordinator consulted to assist with discharge planning.      Utilization Review and Continued Stay Justification    Medical Necessity Criteria:    For any criteria that is not met, please document reason and plan for discharge, transfer, or modification of plan of care to address.    Requires intensive rehabilitation program to treat functional deficits?: Yes    Requires 3x per week or greater involvement of rehabilitation physician to oversee rehabilitation program?: Yes    Requires rehabilitation nursing interventions?: Yes    Patient is making functional progress?: Yes    There is a potential for additional functional progress? Yes    Patient is participating in therapy 3 hours per day a minimum of 5 days per week or 15 hours per week in 7 day period?:Yes    Has discharge needs that " require coordinated discharge planning approach?:Yes      Barriers/Concerns related to meeting medical necessity criteria:  None    Team Plan to Address Concern:  N/A      Final Physician Sign off    Statement of Approval: I have reviewed and agree with the recommendations and documentation in this care conference note.       Patient Goals  SW: Confirm discharge recommendations with therapy, coordinate safe discharge plan and remain available to support and assist as needed.       OT Frequency: daily 60-90 minutes  OT goal: hygiene/grooming: modified independent  OT goal: upper body dressing: Modified independent  OT goal: lower body dressing: Modified independent  OT goal: upper body bathing: Supervision/stand-by assist  OT goal: lower body bathing: Supervision/stand-by assist  OT goal: bed mobility: Independent  OT Goal: transfer: Supervision/stand-by assist, with assistive device(with shower transfer)  OT goal: toilet transfer/toileting: Modified independent, cleaning and garment management, toilet transfer(using FWW)  OT goal: meal preparation: Modified independent, with simple meal preparation, ambulatory level(with FWW)  OT goal: cognitive: Patient/caregiver will verbalize understanding of cognitive assessment results/recommendations as needed for safe discharge planning  OT goal 1: Pt will perform HEP for BUE strengthening/ROM to decrease pain and increase functional use for ADLs          PT Frequency: 60 min daily  PT goal: bed mobility: Independent, Supine to/from sit, Rolling  PT goal: transfers: Modified independent, Sit to/from stand, Bed to/from chair, Assistive device(WW)  PT goal: gait: Modified independent, 150 feet, Rolling walker  PT goal: stairs: Modified independent, 1 stair, Assistive device(FWW)  PT goal: perform aerobic activity with stable cardiovascular response: continuous activity, 15 minutes, NuStep  PT goal 1: Car transfer, FWW, SBA  PT Goal 2: HEP - independent with handout for BLE  strengthening standing and standing dynami cbalance  PT Goal 3: Complete [place]     SLP Frequency: daily     SLP goal 1: SLP: pt will improve verbal/wwritten reasoning/executive function for complex tasks 90% accuracy min assist for increased independence with IALDs  SLP goal 2: SLP: pt to improve attention/mmeory in complex tasks with distractors 90% accuracy min cues for increased independence with IALDS                  Nursing Goal: Bowel: Pt will continue to advocate for bowel medications and timed toileting to continue with bowel continence by 1/21/2021.  Nursing Goal: Bladder: Pt and caregiver will demonstrate ervin cares/maintenance by 1/21/2021.  Nursing Goal: Medication Management: Pt will participate in and pass MAP by 1/21/2021.  Nursing Goal: Safety Management: Pt will use call light and wait for staff to arrive prior to all transfers by 1/21/2021.

## 2021-01-12 NOTE — PLAN OF CARE
Discharge Planner Post-Acute Rehab SLP:      Discharge Plan:home with assist     Precautions: fall     Current Status:  Communication: WNL  Cognition: mild deficits  Swallow: no problems reported     Assessment: worked on moderate complex activities for processing speed, deductive reasoning, attention detail and working memory, mild difficulty intermittent cueing with increased accuracy over time/repetition and verbal cueing. Recommended pt wirte down questions he has for team  Other Barriers to Discharge (Family Training, etc): TBD

## 2021-01-12 NOTE — PLAN OF CARE
"Alert and oriented x4. Patient reported no new issues overnight. Notes he slept well. Requested and received prn Tylenol x1 this morning for shoulder and lower back pain. Patient notes that the Tylenol does not provide full relief but he is hesitant to utilize anything else for the pain, \" I am not a drug person.\". IV antibiotic infused as per orders without problems. Craig catheter is patent and with adequate output. Utilizes call light appropriately.       Patient's most recent vital signs are:     Vital signs:  BP: 127/70  Temp: 97.8  HR: 82  RR: 16  SpO2: 92 %     Patient does not have new respiratory symptoms.  Patient does not have new sore throat.  Patient does not have a fever greater than 99.5.         "

## 2021-01-12 NOTE — PROGRESS NOTES
Patient is A&O x4. Denied CP, lightheadedness, dizziness, and SOB. VSS, except a temp. of 99.5 at the beginning of the shift, received tylenol and recheck was 97.8. Reported left shoulder pain and PRN tylenol given with good effect. Left midline is patent and SL after antibiotic given. Craig patent and draining clear yellow urine Pt is drinking well. LBM 1/10 per pt report. Pt is self repositioned and turned in bed, able to use call light appropriately and make needs known, will continue to monitor patient as POC.   Patient does not have new respiratory symptoms.  Patient does not have new sore throat.  Patient does not have a fever greater than 99.5.

## 2021-01-12 NOTE — PROGRESS NOTES
[facility]   Acute Rehabilitation Unit  Daily progress note    INTERVAL HISTORY  Pt seen in team rounds today.  No acute events overnight.  Continues to have left shoulder pain, but is improving with therapy and conservative measures.  He is making excellent functional improvements, and will plan to move up tentative discharge date to 1/15/21 after therapies.  He will need IV antibiotics until 1/20/21.  For full functional updates, see team rounds note from today.       MEDICATIONS  Scheduled:    cefTRIAXone  2 g Intravenous Q12H     fluticasone  1 spray Both Nostrils BID     heparin ANTICOAGULANT  5,000 Units Subcutaneous Q12H     levETIRAcetam  1,000 mg Oral BID     omeprazole  20 mg Oral QAM AC     senna-docusate  1 tablet Oral BID     sodium chloride (PF)  10 mL Intracatheter Q8H        PRN:  acetaminophen, bisacodyl, lidocaine 4%, lidocaine (buffered or not buffered), melatonin, menthol (Topical Analgesic) 2.5%, polyethylene glycol, sodium chloride (PF)       PHYSICAL EXAM  Patient Vitals for the past 24 hrs:   BP Temp Temp src Pulse Resp SpO2   01/12/21 0820 131/77 96.5  F (35.8  C) Oral 89 18 95 %   01/11/21 1807 -- 97.8  F (36.6  C) Oral -- -- --   01/11/21 1703 127/70 99.5  F (37.5  C) Oral 82 16 92 %       GEN: Alert oriented, sitting comfortably in bed, not in acute distress.  HEENT: Neck supple, extraocular movement intact.  PULM: Breathing comfortably on room air.  ABD: Nondistended  : Craig catheter in place, draining clear yellow urine  EXT: No LE edema or calf tenderness b/l   Neuro: Answers appropriately, follows commands    LABS  CBC RESULTS:   Recent Labs   Lab Test 01/07/21  0854   WBC 18.0*   RBC 4.47   HGB 13.1*   HCT 40.5   MCV 91   MCH 29.3   MCHC 32.3   RDW 13.7            Last Comprehensive Metabolic Panel:  Sodium   Date Value Ref Range Status   01/07/2021 137 133 - 144 mmol/L Final     Potassium   Date Value Ref Range Status   01/07/2021 3.9  3.4 - 5.3 mmol/L Final     Chloride   Date Value Ref Range Status   01/07/2021 106 94 - 109 mmol/L Final     Carbon Dioxide   Date Value Ref Range Status   01/07/2021 27 20 - 32 mmol/L Final     Anion Gap   Date Value Ref Range Status   01/07/2021 4 3 - 14 mmol/L Final     Glucose   Date Value Ref Range Status   01/07/2021 118 (H) 70 - 99 mg/dL Final     Urea Nitrogen   Date Value Ref Range Status   01/07/2021 22 7 - 30 mg/dL Final     Creatinine   Date Value Ref Range Status   01/07/2021 0.82 0.66 - 1.25 mg/dL Final     GFR Estimate   Date Value Ref Range Status   01/07/2021 >90 >60 mL/min/[1.73_m2] Final     Comment:     Non  GFR Calc  Starting 12/18/2018, serum creatinine based estimated GFR (eGFR) will be   calculated using the Chronic Kidney Disease Epidemiology Collaboration   (CKD-EPI) equation.       Calcium   Date Value Ref Range Status   01/07/2021 8.2 (L) 8.5 - 10.1 mg/dL Final       Recent Labs   Lab 01/07/21  0854 01/07/21  0321 01/06/21  2214 01/06/21  1659 01/06/21  1105 01/06/21  0948 01/06/21  0800 01/06/21  0356   *  --   --   --   --  115*  --   --    BGM  --  108* 131* 109* 106*  --  117* 110*       IMPRESSION/PLAN:  Stevie Dotson is a 65 year old man who presented with altered mental status and fever diagnosed with meningitis admitted to acute rehab 1/7/21 for ongoing rehabilitation and medical management.      Admission to acute inpatient rehab bacterial meningitis  Impairment group code: 02.1        1. PT, OT and SLP 60 minutes of each on a daily basis, in addition to rehab nursing and close management of physiatrist.       2. Impairment of ADL's: Noted to have impaired strength, impaired activity tolerance, and impaired cognition will benefit from ongoing OT with goal for MOD I with basic adls.      3. Impairment of mobility:  Noted to have impaired strength, and impaired activity tolerance will benefit from ongoing PT with goal for MOD I with basic mobility.          4. Impairment of cognition/language/swallow:  Noted to have impaired swallow, cognition, and vocal hoarseness will benefit from ongoing SLP with goal to improve ability to communicate basic needs.     Medical Conditions  #Strep pneumoniae meningitis-  # Encephalopathy-  presented with AMS and fever, Started on broad spectrum antimicrobials and steroids. CSF cultures & blood cultures positive for step pneumo. MRI/A showed decreased attenuation of R hemispheric vessels and evidence of purulent material in his ventricles and leptomeninges explains the presentation.   -weekly labs- per ID recs- (CBC w/ diff, BMP, SGOT)  -continue 2 additional weeks of ceftriaxone (from 1/6/21)  -continue PT/OT/SLP     #Possible seizures -Right hemispheric focal slowing and transient left arm stiffening at presentation worrisome for seizure, but no seizures or epileptiform discharges captured on EEG  -continue keppra  -follow up neurology      #Hyperglycemia- Hgb a1C 6.4%  consistent with pre- diabetes, was on insulin/ssi when on steroids bg improved with completion of steroids  -routine monitoring with labs  -follow up pcp     # Leucocytosis- in setting of infection and 4 day course of steroids- now down trending WBC 18 1/7  -We will continue to monitor.     #Urinary retention (neurogenic bladder?)- with ervin catheter removed 1/5 with retention and replaced 1/6.   -Will do voiding trial today and monitor PVRs     #History of Alcohol Use- reportedly ~12 beers weekly per wife report patient reports a few beers on weeknds, no signs of withdrawal     #Polyneuropathy?: reports 20 year h/o paresthesia in feet (mostly toes and the plantar aspects). His symptoms have been clearly worse since hospitalization. Never had any work-up or formal diagnosis. Likely secondary to alcohol use vs small fiber given the very slow course. May benefit from basic blood work and NCS/EMG as outpatient after discharge.     #L shoulder pain  -Likely  muscular vs. Impingement.  XR done showed no acute bony pathology  -Encourage Tylenol, will add Icy Hot.  Continue therapies.     1. Adjustment to disability:  Clinical psychology to eval and treat as needed  2. FEN: reg  3. Bowel: continent  4. Bladder: Voiding trial today  5. DVT Prophylaxis: Discontinue Heparin given adequate ambulation distances  6. GI Prophylaxis: ppi  7. Code: full  8. Disposition: home  9. ELOS: Tentative discharge date 1/15/21 after therapies  10. Rehab prognosis:  good  11. Follow up Appointments on Discharge: neurology, PCP.  Will need IV antibiotics until 1/20/21.  Care coordinator consulted.     Yinka Ricardo MD  Department of Rehabilitation Medicine    Time Spent on this Encounter   I, Yinka Ricardo, spent a total of 35 minutes face-to-face or managing the care of Stevie Dotson. Over 50% of my time on the unit was spent counseling the patient and coordinating care. See note for details.

## 2021-01-12 NOTE — PLAN OF CARE
VS: /77 (BP Location: Left arm)   Pulse 89   Temp 96.5  F (35.8  C) (Oral)   Resp 18   Wt 107 kg (236 lb)   SpO2 95%   BMI 31.14 kg/m     Denies chest pain and SOB  Alert and oriented   O2: >90% on RA   Output: Craig removed at 10am per MD verbal order during rounds. Waiting for pt to void on own in bedside urinal. PVRs after voiding.  Voided 200, PVR: 200. Voided a second time 300, PVR: 50   Last BM: 1/12/21  Bowel sounds active   Activity: Ax1 with walker and gait belt   Skin: Intact   Pain: Managed w/ PRN tylenol   CMS: Numbness to feet bilaterally   Dressing: N/A   Diet: Regular   LDA: Midline R arm - SL   Equipment: Personal belongings, walker, urinal   Plan: Continue to monitor. Plan for discharge 1/15/21   Additional Info:         Patient's most recent vital signs are:     Vital signs:  BP: 131/77  Temp: 96.5  HR: 89  RR: 18  SpO2: 95 %     Patient does not have new respiratory symptoms.  Patient does not have new sore throat.  Patient does not have a fever greater than 99.5.

## 2021-01-13 ENCOUNTER — APPOINTMENT (OUTPATIENT)
Dept: PHYSICAL THERAPY | Facility: CLINIC | Age: 66
End: 2021-01-13
Payer: COMMERCIAL

## 2021-01-13 ENCOUNTER — APPOINTMENT (OUTPATIENT)
Dept: OCCUPATIONAL THERAPY | Facility: CLINIC | Age: 66
End: 2021-01-13
Payer: COMMERCIAL

## 2021-01-13 PROCEDURE — 250N000013 HC RX MED GY IP 250 OP 250 PS 637: Performed by: PHYSICIAN ASSISTANT

## 2021-01-13 PROCEDURE — 250N000011 HC RX IP 250 OP 636: Performed by: PHYSICAL MEDICINE & REHABILITATION

## 2021-01-13 PROCEDURE — 97140 MANUAL THERAPY 1/> REGIONS: CPT | Mod: GP

## 2021-01-13 PROCEDURE — 258N000003 HC RX IP 258 OP 636

## 2021-01-13 PROCEDURE — 97110 THERAPEUTIC EXERCISES: CPT | Mod: GO | Performed by: OCCUPATIONAL THERAPIST

## 2021-01-13 PROCEDURE — 97530 THERAPEUTIC ACTIVITIES: CPT | Mod: GP

## 2021-01-13 PROCEDURE — 250N000013 HC RX MED GY IP 250 OP 250 PS 637: Performed by: PHYSICAL MEDICINE & REHABILITATION

## 2021-01-13 PROCEDURE — 128N000003 HC R&B REHAB

## 2021-01-13 PROCEDURE — 97110 THERAPEUTIC EXERCISES: CPT | Mod: GO

## 2021-01-13 PROCEDURE — 97530 THERAPEUTIC ACTIVITIES: CPT | Mod: GO | Performed by: OCCUPATIONAL THERAPIST

## 2021-01-13 PROCEDURE — 99232 SBSQ HOSP IP/OBS MODERATE 35: CPT | Performed by: PHYSICAL MEDICINE & REHABILITATION

## 2021-01-13 PROCEDURE — 97110 THERAPEUTIC EXERCISES: CPT | Mod: GP

## 2021-01-13 RX ORDER — SODIUM CHLORIDE 9 MG/ML
INJECTION, SOLUTION INTRAVENOUS
Status: COMPLETED
Start: 2021-01-13 | End: 2021-01-13

## 2021-01-13 RX ORDER — METHOCARBAMOL 500 MG/1
500 TABLET, FILM COATED ORAL 4 TIMES DAILY PRN
Status: DISCONTINUED | OUTPATIENT
Start: 2021-01-13 | End: 2021-01-15 | Stop reason: HOSPADM

## 2021-01-13 RX ADMIN — METHOCARBAMOL 500 MG: 500 TABLET ORAL at 11:00

## 2021-01-13 RX ADMIN — FLUTICASONE PROPIONATE 1 SPRAY: 50 SPRAY, METERED NASAL at 08:30

## 2021-01-13 RX ADMIN — LEVETIRACETAM 1000 MG: 500 TABLET, FILM COATED ORAL at 08:30

## 2021-01-13 RX ADMIN — ACETAMINOPHEN 650 MG: 325 TABLET, FILM COATED ORAL at 14:02

## 2021-01-13 RX ADMIN — CEFTRIAXONE SODIUM 2 G: 2 INJECTION, POWDER, FOR SOLUTION INTRAMUSCULAR; INTRAVENOUS at 06:09

## 2021-01-13 RX ADMIN — SENNOSIDES AND DOCUSATE SODIUM 1 TABLET: 8.6; 5 TABLET ORAL at 08:30

## 2021-01-13 RX ADMIN — LEVETIRACETAM 1000 MG: 500 TABLET, FILM COATED ORAL at 22:05

## 2021-01-13 RX ADMIN — OMEPRAZOLE 20 MG: 20 CAPSULE, DELAYED RELEASE ORAL at 06:10

## 2021-01-13 RX ADMIN — ACETAMINOPHEN 650 MG: 325 TABLET, FILM COATED ORAL at 22:05

## 2021-01-13 RX ADMIN — ACETAMINOPHEN 650 MG: 325 TABLET, FILM COATED ORAL at 18:06

## 2021-01-13 RX ADMIN — METHOCARBAMOL 500 MG: 500 TABLET ORAL at 22:05

## 2021-01-13 RX ADMIN — ACETAMINOPHEN 650 MG: 325 TABLET, FILM COATED ORAL at 05:28

## 2021-01-13 RX ADMIN — CEFTRIAXONE SODIUM 2 G: 2 INJECTION, POWDER, FOR SOLUTION INTRAMUSCULAR; INTRAVENOUS at 18:07

## 2021-01-13 RX ADMIN — SODIUM CHLORIDE, PRESERVATIVE FREE: 5 INJECTION INTRAVENOUS at 06:26

## 2021-01-13 RX ADMIN — METHOCARBAMOL 500 MG: 500 TABLET ORAL at 17:13

## 2021-01-13 RX ADMIN — ACETAMINOPHEN 650 MG: 325 TABLET, FILM COATED ORAL at 09:37

## 2021-01-13 NOTE — PROGRESS NOTES
"CLINICAL NUTRITION SERVICES - ASSESSMENT NOTE     Nutrition Prescription    RECOMMENDATIONS FOR MDs/PROVIDERS TO ORDER:  None today    Malnutrition Status:    Patient does not meet two of the established criteria necessary for diagnosing malnutrition    Recommendations already ordered by Registered Dietitian (RD):  None today    Future/Additional Recommendations:  None today     REASON FOR ASSESSMENT  Stevie Dotson is a/an 65 year old male assessed by the dietitian for pts wife requested RD general healthy diet education    NUTRITION HISTORY  Pt reports he typically skips breakfast, drinks 1 Premier Protein for lunch, occasionally another one in the afternoon and eats either a meat roast with potatoes and corn for dinner.    CURRENT NUTRITION ORDERS  Diet: Regular  Intake: Pt is eating 100% of meals per flowsheet, consistently ordering scrambled eggs, fruit and OJ.    LABS  Labs reviewed    MEDICATIONS  Medications reviewed    ANTHROPOMETRICS  Ht Readings from Last 1 Encounters:   12/30/20 1.854 m (6' 1\")   Most Recent Weight: 107 kg (236 lb)  IBW: 83.6 kg (128% IBW)  BMI: Overweight BMI 25-29.9  Weight History: Patient has lost 18 lbs (7%) over 2 days. Question accuracy of weights as wt was previously stable.   Wt Readings from Last 10 Encounters:   01/07/21 107 kg (236 lb)   01/05/21 115.2 kg (254 lb)   12/30/20 113.4 kg (250 lb)   03/08/19 112.9 kg (249 lb)   11/14/18 115.4 kg (254 lb 8 oz)   12/30/17 113.4 kg (250 lb)     Dosing Weight: 89 kg - adjusted from admit wt    ASSESSED NUTRITION NEEDS  Estimated Energy Needs: 7611-5198 kcals/day (20 - 25 kcals/kg)  Justification: Maintenance and Obese  Estimated Protein Needs:  grams protein/day (1 - 1.2 grams of pro/kg)  Justification: Obesity guidelines  Estimated Fluid Needs: 1 mL/kcal  Justification: Per provider pending fluid status    PHYSICAL FINDINGS  See malnutrition section below.    MALNUTRITION  % Intake: Decreased intake does not meet " criteria  % Weight Loss: Weight loss does not meet criteria  Subcutaneous Fat Loss: None observed  Muscle Loss: None observed  Fluid Accumulation/Edema: Trace  Malnutrition Diagnosis: Patient does not meet two of the established criteria necessary for diagnosing malnutrition    NUTRITION DIAGNOSIS  Predicted inadequate protein-energy intake related to variable appetite as evidenced by pt reliant on PO intakes to meet 100% of nutritional needs with potential for variation       INTERVENTIONS  Implementation  Nutrition Education: Provided MyPlate and Heart Health Cooking Tips handouts and discussed contents. Encouraged pt to try incorporating more fruits and vegetable into his diet and applauded him for eating a good amount of protein. He denied and questions or concerns at this time.     Goals  Patient to consume % of nutritionally adequate meal trays TID, or the equivalent with supplements/snacks.     Monitoring/Evaluation  Progress toward goals will be monitored and evaluated per protocol.      Jojo De Paz RD, LD  TCU/8A Pager (M-F): [phone]

## 2021-01-13 NOTE — PROGRESS NOTES
Nutrition Brief Note    Per message from OT, pts spouse, Betzaida wanted to speak with RD.     Called Betzaida today regarding regular diet and no dietary restrictions going home. She is concerned that his diet at home is not healthy. Reports he consumes a Premier Protein shake for lunch and meat and potatoes/pasta for dinner and snacks in the evening on trail mix or chips with beer at home. She mentioned he is only eating eggs and fruit this admission and feels he could eat better variety. Requesting RD to discuss general healthy diet with pt.       Jojo De Paz RD, LD  TCU/8A Pager (M-F): [phone]

## 2021-01-13 NOTE — PROGRESS NOTES
TCU Care Coordinator Progress Note    Admission date: 1/7/2021    Data: Stevie Dotson is a 66 yo male ARU patient on TCU for rehab and IV antibiotics following hospitalization for bacterial meningitis.    Intervention: Met with patient to introduce the role of Care Coordinator and to begin discussion of anticipated discharge planning needs. Initiated referral with [facility] for IV Ceftriaxone, patient would be self-pay with no IV insurance benefit, estimate $35/day for drug and supplies and $90 per RN visit if not homebound. Initiated IV antibiotics referral with [facility], no IV insurance benefit, self-pay estimate $20/day for drug and supplies, and $90 per RN visit if not homebound. Initiated PLC referral for IV teaching with patient and wife.    Assessment: Patient will have above home infusion need. He states he accepts self-pay and chooses to use [facility]. [facility] referral cancelled. He states wife is an RN with much IV experience and feels comfortable with home infusion. Outpatient therapy likely so patient would not be homebound. He states he is OK with $90 RN visits if needed since he would not be homebound. PLC IV teaching scheduled 1/15/2021 at noon.    Plan: Will continue to follow discharge planning needs throughout ARU/TCU stay. Target discharge date 1/15/2021 after PLC teaching, wife to transport.    Javier Womack RN, BSN, Patient Care Management Coordinator  [facility]  [address], [address] [place] [address]  [email or web address]  [email or web address]   Desk: [phone] TCU Main [phone] Fax [phone] Pager [phone]

## 2021-01-13 NOTE — PLAN OF CARE
Discharge Planner Post-Acute Rehab OT:      Discharge Plan: home with spouse, HH vs OP OT pending progress     Precautions: fall, L shoulder pain     Current Status:  Pt mod IND supine <> STS at FWW and ambulated w/o LOB > 400 feet . Pt tolerated standing balance and BUE ROM/strenghtening at FES bike for 5 minutes before c/o of LUE pain. Pt mod IND transfer standing <> supine. Pt concerned with LUE pain and discussed home options for pain management such as heating pad and pairing stretches after heat application with Therapist. Therapist provided LUE/upper trap manual palpation for pain management and to encourage BUE function for rest of day.      Assessment: Pt seen this date for scapular immobilization and isolating L shoulder  to increase stabalization of scapula to decrease pain and increase strengthen isolating shoulder muscles with air splint  against gravity   Shoulder flex reps 15ext  15reps,    horizontal abd 7 aarom 2-/5 mmg reps,add  15, and shoulder protraction/retraction supine of elbow and shoulder tolerated well. pt shown 3 scapula stabilization ex  Pt able to verbalize and demo and able to carry out in room supine bed flat. With prolong stretch pt able to get L shoulder flex to 30 degrees. Pt report increase motion with no pain  Other Barriers to Discharge (DME, Family Training, etc):   Spouse is RN, able to provide assist as needed. May need shower chair, FWW, bed wedge. Educate client on pain management techniques such as self massage and heat modalities to promote function.

## 2021-01-13 NOTE — PLAN OF CARE
Alert and oriented x4. Patient reported no new issues overnight. Medicated with prn Tylenol x1 this morning per request for shoulder and lower back pain. IV antibiotic infused as per orders without problems. Patient is voiding adequate amounts without issues (ervin removed yesterday 1/12). Calls appropriately. Continue POC.       Patient's most recent vital signs are:     Vital signs:  BP: 137/80  Temp: 98.6  HR: 89  RR: 16  SpO2: 92 %     Patient does not have new respiratory symptoms.  Patient does not have new sore throat.  Patient does not have a fever greater than 99.5.

## 2021-01-13 NOTE — PROGRESS NOTES
"  [facility] acute hospital   Acute Rehabilitation Unit  Daily progress note    INTERVAL HISTORY  No acute events overnight.  This morning Mr. Dotson continues to feel pain in the L shoulder and lower back, and feels \"tightness\" in his muscles.  He is using ice and Tylenol, but we discussed trying a muscle relaxer as well which he is agreeable to.  His Craig was removed yesterday and he is able to void volitionally with low PVRs.  He is asking if he should get the COVID vaccine.  Given he is still being actively treated for meningitis, I would recommend holding off for now and he can re-discuss as an outpatient with his PCP, neurology, or ID.  Functionally he was able to ambulate 400 ft with a FWW and SBA, and able to navigate 3x6 stairs with b/l rails and CGA.  He is on track for discharge home on 1/15/21.       MEDICATIONS  Scheduled:    cefTRIAXone  2 g Intravenous Q12H     fluticasone  1 spray Both Nostrils BID     levETIRAcetam  1,000 mg Oral BID     omeprazole  20 mg Oral QAM AC     senna-docusate  1 tablet Oral BID     sodium chloride (PF)  10 mL Intracatheter Q8H        PRN:  acetaminophen, bisacodyl, lidocaine 4%, lidocaine (buffered or not buffered), melatonin, menthol (Topical Analgesic) 2.5%, methocarbamol, polyethylene glycol, sodium chloride (PF)       PHYSICAL EXAM  Patient Vitals for the past 24 hrs:   BP Temp Temp src Pulse Resp SpO2   01/13/21 0737 124/83 97.5  F (36.4  C) Oral 83 20 94 %   01/12/21 1723 137/80 98.6  F (37  C) Oral 89 16 92 %       GEN: Alert oriented, sitting comfortably in bed, not in acute distress.  HEENT: Neck supple, extraocular movement intact.  CVS: RRR, S1+S2, no m/r/g  PULM: Breathing comfortably on room air, CTA b/l  ABD: Soft, NT, BS+  : Craig now removed  EXT: No LE edema or calf tenderness b/l.  +TTP over upper trapezius.  Neuro: Answers appropriately, follows commands    LABS  CBC RESULTS:   Recent Labs   Lab Test 01/07/21  0854   WBC " 18.0*   RBC 4.47   HGB 13.1*   HCT 40.5   MCV 91   MCH 29.3   MCHC 32.3   RDW 13.7          Last Comprehensive Metabolic Panel:  Sodium   Date Value Ref Range Status   01/07/2021 137 133 - 144 mmol/L Final     Potassium   Date Value Ref Range Status   01/07/2021 3.9 3.4 - 5.3 mmol/L Final     Chloride   Date Value Ref Range Status   01/07/2021 106 94 - 109 mmol/L Final     Carbon Dioxide   Date Value Ref Range Status   01/07/2021 27 20 - 32 mmol/L Final     Anion Gap   Date Value Ref Range Status   01/07/2021 4 3 - 14 mmol/L Final     Glucose   Date Value Ref Range Status   01/07/2021 118 (H) 70 - 99 mg/dL Final     Urea Nitrogen   Date Value Ref Range Status   01/07/2021 22 7 - 30 mg/dL Final     Creatinine   Date Value Ref Range Status   01/07/2021 0.82 0.66 - 1.25 mg/dL Final     GFR Estimate   Date Value Ref Range Status   01/07/2021 >90 >60 mL/min/[1.73_m2] Final     Comment:     Non  GFR Calc  Starting 12/18/2018, serum creatinine based estimated GFR (eGFR) will be   calculated using the Chronic Kidney Disease Epidemiology Collaboration   (CKD-EPI) equation.       Calcium   Date Value Ref Range Status   01/07/2021 8.2 (L) 8.5 - 10.1 mg/dL Final       Recent Labs   Lab 01/07/21  0854 01/07/21  0321 01/06/21  2214 01/06/21  1659 01/06/21  1105   *  --   --   --   --    BGM  --  108* 131* 109* 106*       IMPRESSION/PLAN:  Stevie Dotson is a 65 year old man who presented with altered mental status and fever diagnosed with meningitis admitted to acute rehab 1/7/21 for ongoing rehabilitation and medical management.      Admission to acute inpatient rehab bacterial meningitis  Impairment group code: 02.1        1. PT, OT and SLP 60 minutes of each on a daily basis, in addition to rehab nursing and close management of physiatrist.       2. Impairment of ADL's: Noted to have impaired strength, impaired activity tolerance, and impaired cognition will benefit from ongoing OT with goal  for MOD I with basic adls.      3. Impairment of mobility:  Noted to have impaired strength, and impaired activity tolerance will benefit from ongoing PT with goal for MOD I with basic mobility.         4. Impairment of cognition/language/swallow:  Noted to have impaired swallow, cognition, and vocal hoarseness will benefit from ongoing SLP with goal to improve ability to communicate basic needs.     Medical Conditions  #Strep pneumoniae meningitis-  # Encephalopathy-  presented with AMS and fever, Started on broad spectrum antimicrobials and steroids. CSF cultures & blood cultures positive for step pneumo. MRI/A showed decreased attenuation of R hemispheric vessels and evidence of purulent material in his ventricles and leptomeninges explains the presentation.   -weekly labs- per ID recs- (CBC w/ diff, BMP, SGOT) - Re-check tomorrow  -continue 2 additional weeks of ceftriaxone (from 1/6/21)  -continue PT/OT/SLP     #Possible seizures -Right hemispheric focal slowing and transient left arm stiffening at presentation worrisome for seizure, but no seizures or epileptiform discharges captured on EEG  -continue keppra  -follow up neurology      #Hyperglycemia- Hgb a1C 6.4%  consistent with pre- diabetes, was on insulin/ssi when on steroids bg improved with completion of steroids  -routine monitoring with labs  -follow up pcp     # Leucocytosis- in setting of infection and 4 day course of steroids- now down trending WBC 18 1/7  -We will continue to monitor.  Re-check tomorrow     #Urinary retention (neurogenic bladder?)- with ervin catheter removed 1/5 with retention and replaced 1/6.   -Ervin removed 1/12 and has been able to void with low PVRs     #History of Alcohol Use- reportedly ~12 beers weekly per wife report patient reports a few beers on weeknds, no signs of withdrawal     #Polyneuropathy?: reports 20 year h/o paresthesia in feet (mostly toes and the plantar aspects). His symptoms have been clearly worse since  hospitalization. Never had any work-up or formal diagnosis. Likely secondary to alcohol use vs small fiber given the very slow course. May benefit from basic blood work and NCS/EMG as outpatient after discharge.     #L shoulder pain  -Likely muscular vs. Impingement.  XR done showed no acute bony pathology  -Encourage Tylenol, will add Icy Hot.  Continue therapies.  -Add Robaxin PRN       1. Adjustment to disability:  Clinical psychology to eval and treat as needed  2. FEN: reg  3. Bowel: continent  4. Bladder: Continent  5. DVT Prophylaxis: Discontinued Heparin given adequate ambulation distances  6. GI Prophylaxis: ppi  7. Code: full  8. Disposition: home  9. ELOS: Tentative discharge date 1/15/21 after therapies  10. Rehab prognosis:  good  11. Follow up Appointments on Discharge: neurology, PCP.  Will need IV antibiotics until 1/20/21.  Care coordinator consulted.     Yinka Ricardo MD  Department of Rehabilitation Medicine    Time Spent on this Encounter   I, Yinka Ricardo, spent a total of 25 minutes face-to-face or managing the care of Stevie Nila. Over 50% of my time on the unit was spent counseling the patient and coordinating care. See note for details.

## 2021-01-13 NOTE — PLAN OF CARE
"Discharge Plan: home with supervision from wife, OP PT.     Precautions: falls, L shoulder pain     Current Status:  Transfer: Daron  Bed Mobility: Daron  Gait: amb 400' FWW with SBA  Stairs: up/down 3x6\" steps, B rails, CGA. Able to safely perform 6\" curb step up with FWW and SBA/CGA  Balance: Fair static and dynamic standing balance. TOMAS 1/9/21: 31/56     Assessment: Pt now Daron in room with use of walker.  Working on endurance with ambulation using walker.  Also completed soft tissue mobilization and stretches to L shoulder for pain relief.     Other Barriers to Discharge (DME, Family Training, etc): FWW from [facility]   "

## 2021-01-13 NOTE — PLAN OF CARE
Pt is A&O x 4.  VSS on RA. Pt reports pain in L shoulder and lower back, cold pack applied to L shoulder with some relief, tylenol given x 2 with some relief. New order for PRN Robaxin as well given x 1 this shift. Midline in RA saline locked and CDI. Transferring Mod I with walker. Voiding well.  LBM 1/12 per pt. Calls appropriately.     Patient's most recent vital signs are:     Vital signs:  BP: 124/83  Temp: 97.5  HR: 83  RR: 20  SpO2: 94 %     Patient does not have new respiratory symptoms.  Patient does not have new sore throat.  Patient does not have a fever greater than 99.5.

## 2021-01-13 NOTE — PROGRESS NOTES
Patient is A&O x4. Denied CP, and SOB. VSS. Continued to have left shoulder and lower back pain, PRN tylenol given twice with good effect. Left midline is patent and SL. Pt is continent of bowel and bladder, drinking well, voiding a good amount of urine spontaneously with out difficulties, PVR completed, see flowsheet for detail. LBM 1/12 per pt report. Denied N/V. Pt is SBA with walker, self repositioned and turned in bed, able to use call light appropriately and make needs known, will continue to monitor patient as POC.   Patient does not have new respiratory symptoms.  Patient does not have new sore throat.  Patient does not have a fever greater than 99.5

## 2021-01-13 NOTE — PLAN OF CARE
Discharge Planner Post-Acute Rehab OT:      Discharge Plan: home with spouse, HH vs OP OT pending progress     Precautions: fall, L shoulder pain     Current Status:  Pt mod IND supine <> STS at FWW and ambulated w/o LOB > 400 feet . Pt tolerated standing balance and BUE ROM/strenghtening at FES bike for 5 minutes before c/o of LUE pain. Pt mod IND transfer standing <> supine. Pt concerned with LUE pain and discussed home options for pain management such as heating pad and pairing stretches after heat application with Therapist. Therapist provided LUE/upper trap manual palpation for pain management and to encourage BUE function for rest of day.      Assessment: Pt presented alert and attentive throughout session, and cognition WNL. Pt mod IND in bed mobility transfers and ambulation with FWW.     Other Barriers to Discharge (DME, Family Training, etc):   Spouse is RN, able to provide assist as needed. May need shower chair, FWW, bed wedge. Educate client on pain management techniques such as self massage and heat modalities to promote function.

## 2021-01-14 ENCOUNTER — APPOINTMENT (OUTPATIENT)
Dept: OCCUPATIONAL THERAPY | Facility: CLINIC | Age: 66
End: 2021-01-14
Payer: COMMERCIAL

## 2021-01-14 ENCOUNTER — APPOINTMENT (OUTPATIENT)
Dept: PHYSICAL THERAPY | Facility: CLINIC | Age: 66
End: 2021-01-14
Payer: COMMERCIAL

## 2021-01-14 ENCOUNTER — APPOINTMENT (OUTPATIENT)
Dept: SPEECH THERAPY | Facility: CLINIC | Age: 66
End: 2021-01-14
Payer: COMMERCIAL

## 2021-01-14 ENCOUNTER — DOCUMENTATION ONLY (OUTPATIENT)
Dept: REHABILITATION | Facility: CLINIC | Age: 66
End: 2021-01-14

## 2021-01-14 LAB
ALBUMIN SERPL-MCNC: 2.4 G/DL (ref 3.4–5)
ALP SERPL-CCNC: 108 U/L (ref 40–150)
ALT SERPL W P-5'-P-CCNC: 79 U/L (ref 0–70)
ANION GAP SERPL CALCULATED.3IONS-SCNC: 5 MMOL/L (ref 3–14)
AST SERPL W P-5'-P-CCNC: 26 U/L (ref 0–45)
BASOPHILS # BLD AUTO: 0.1 10E9/L (ref 0–0.2)
BASOPHILS NFR BLD AUTO: 1 %
BILIRUB DIRECT SERPL-MCNC: 0.1 MG/DL (ref 0–0.2)
BILIRUB SERPL-MCNC: 0.3 MG/DL (ref 0.2–1.3)
BUN SERPL-MCNC: 19 MG/DL (ref 7–30)
CALCIUM SERPL-MCNC: 9.2 MG/DL (ref 8.5–10.1)
CHLORIDE SERPL-SCNC: 102 MMOL/L (ref 94–109)
CO2 SERPL-SCNC: 29 MMOL/L (ref 20–32)
CREAT SERPL-MCNC: 0.82 MG/DL (ref 0.66–1.25)
DIFFERENTIAL METHOD BLD: ABNORMAL
EOSINOPHIL # BLD AUTO: 0.2 10E9/L (ref 0–0.7)
EOSINOPHIL NFR BLD AUTO: 1.7 %
ERYTHROCYTE [DISTWIDTH] IN BLOOD BY AUTOMATED COUNT: 13.6 % (ref 10–15)
GFR SERPL CREATININE-BSD FRML MDRD: >90 ML/MIN/{1.73_M2}
GLUCOSE SERPL-MCNC: 112 MG/DL (ref 70–99)
HCT VFR BLD AUTO: 37.6 % (ref 40–53)
HGB BLD-MCNC: 12.4 G/DL (ref 13.3–17.7)
IMM GRANULOCYTES # BLD: 0.1 10E9/L (ref 0–0.4)
IMM GRANULOCYTES NFR BLD: 0.8 %
LYMPHOCYTES # BLD AUTO: 1.7 10E9/L (ref 0.8–5.3)
LYMPHOCYTES NFR BLD AUTO: 12.9 %
MCH RBC QN AUTO: 29.9 PG (ref 26.5–33)
MCHC RBC AUTO-ENTMCNC: 33 G/DL (ref 31.5–36.5)
MCV RBC AUTO: 91 FL (ref 78–100)
MONOCYTES # BLD AUTO: 1.6 10E9/L (ref 0–1.3)
MONOCYTES NFR BLD AUTO: 11.7 %
NEUTROPHILS # BLD AUTO: 9.6 10E9/L (ref 1.6–8.3)
NEUTROPHILS NFR BLD AUTO: 71.9 %
NRBC # BLD AUTO: 0 10*3/UL
NRBC BLD AUTO-RTO: 0 /100
PLATELET # BLD AUTO: 561 10E9/L (ref 150–450)
POTASSIUM SERPL-SCNC: 4.4 MMOL/L (ref 3.4–5.3)
PROT SERPL-MCNC: 7.7 G/DL (ref 6.8–8.8)
RBC # BLD AUTO: 4.15 10E12/L (ref 4.4–5.9)
SODIUM SERPL-SCNC: 136 MMOL/L (ref 133–144)
WBC # BLD AUTO: 13.4 10E9/L (ref 4–11)

## 2021-01-14 PROCEDURE — 250N000011 HC RX IP 250 OP 636: Performed by: PHYSICAL MEDICINE & REHABILITATION

## 2021-01-14 PROCEDURE — 97110 THERAPEUTIC EXERCISES: CPT | Mod: GO

## 2021-01-14 PROCEDURE — 85025 COMPLETE CBC W/AUTO DIFF WBC: CPT | Performed by: PHYSICAL MEDICINE & REHABILITATION

## 2021-01-14 PROCEDURE — 97116 GAIT TRAINING THERAPY: CPT | Mod: GP

## 2021-01-14 PROCEDURE — 97530 THERAPEUTIC ACTIVITIES: CPT | Mod: GO

## 2021-01-14 PROCEDURE — 250N000013 HC RX MED GY IP 250 OP 250 PS 637: Performed by: PHYSICAL MEDICINE & REHABILITATION

## 2021-01-14 PROCEDURE — 97535 SELF CARE MNGMENT TRAINING: CPT | Mod: GO

## 2021-01-14 PROCEDURE — 97110 THERAPEUTIC EXERCISES: CPT | Mod: GP

## 2021-01-14 PROCEDURE — 80076 HEPATIC FUNCTION PANEL: CPT | Performed by: PHYSICAL MEDICINE & REHABILITATION

## 2021-01-14 PROCEDURE — 99232 SBSQ HOSP IP/OBS MODERATE 35: CPT | Mod: GC | Performed by: PHYSICAL MEDICINE & REHABILITATION

## 2021-01-14 PROCEDURE — 97129 THER IVNTJ 1ST 15 MIN: CPT | Performed by: SPEECH-LANGUAGE PATHOLOGIST

## 2021-01-14 PROCEDURE — 97130 THER IVNTJ EA ADDL 15 MIN: CPT | Performed by: SPEECH-LANGUAGE PATHOLOGIST

## 2021-01-14 PROCEDURE — 128N000003 HC R&B REHAB

## 2021-01-14 PROCEDURE — 97530 THERAPEUTIC ACTIVITIES: CPT | Mod: GP

## 2021-01-14 PROCEDURE — 250N000013 HC RX MED GY IP 250 OP 250 PS 637: Performed by: PHYSICIAN ASSISTANT

## 2021-01-14 PROCEDURE — 80048 BASIC METABOLIC PNL TOTAL CA: CPT | Performed by: PHYSICAL MEDICINE & REHABILITATION

## 2021-01-14 PROCEDURE — 258N000003 HC RX IP 258 OP 636

## 2021-01-14 PROCEDURE — 250N000011 HC RX IP 250 OP 636: Performed by: STUDENT IN AN ORGANIZED HEALTH CARE EDUCATION/TRAINING PROGRAM

## 2021-01-14 RX ORDER — FLUTICASONE PROPIONATE 50 MCG
1 SPRAY, SUSPENSION (ML) NASAL 2 TIMES DAILY
Qty: 9.9 ML | Refills: 0 | Status: SHIPPED | OUTPATIENT
Start: 2021-01-14

## 2021-01-14 RX ORDER — SODIUM CHLORIDE 9 MG/ML
INJECTION, SOLUTION INTRAVENOUS
Status: COMPLETED
Start: 2021-01-14 | End: 2021-01-14

## 2021-01-14 RX ORDER — ACETAMINOPHEN 325 MG/1
650 TABLET ORAL EVERY 4 HOURS PRN
Qty: 30 TABLET | Refills: 0 | Status: SHIPPED | OUTPATIENT
Start: 2021-01-14 | End: 2021-02-13

## 2021-01-14 RX ORDER — METHOCARBAMOL 500 MG/1
500 TABLET, FILM COATED ORAL 4 TIMES DAILY PRN
Qty: 28 TABLET | Refills: 0 | Status: SHIPPED | OUTPATIENT
Start: 2021-01-14 | End: 2021-01-22

## 2021-01-14 RX ORDER — LEVETIRACETAM 1000 MG/1
1000 TABLET ORAL 2 TIMES DAILY
Qty: 60 TABLET | Refills: 0 | Status: SHIPPED | OUTPATIENT
Start: 2021-01-14 | End: 2021-01-25

## 2021-01-14 RX ADMIN — METHOCARBAMOL 500 MG: 500 TABLET ORAL at 17:41

## 2021-01-14 RX ADMIN — ACETAMINOPHEN 650 MG: 325 TABLET, FILM COATED ORAL at 14:49

## 2021-01-14 RX ADMIN — ACETAMINOPHEN 650 MG: 325 TABLET, FILM COATED ORAL at 06:53

## 2021-01-14 RX ADMIN — ACETAMINOPHEN 650 MG: 325 TABLET, FILM COATED ORAL at 21:59

## 2021-01-14 RX ADMIN — CEFTRIAXONE SODIUM 2 G: 2 INJECTION, POWDER, FOR SOLUTION INTRAMUSCULAR; INTRAVENOUS at 06:48

## 2021-01-14 RX ADMIN — METHOCARBAMOL 500 MG: 500 TABLET ORAL at 12:19

## 2021-01-14 RX ADMIN — CEFTRIAXONE SODIUM 2 G: 2 INJECTION, POWDER, FOR SOLUTION INTRAMUSCULAR; INTRAVENOUS at 17:42

## 2021-01-14 RX ADMIN — LEVETIRACETAM 1000 MG: 500 TABLET, FILM COATED ORAL at 08:29

## 2021-01-14 RX ADMIN — LEVETIRACETAM 1000 MG: 500 TABLET, FILM COATED ORAL at 21:58

## 2021-01-14 RX ADMIN — METHOCARBAMOL 500 MG: 500 TABLET ORAL at 21:59

## 2021-01-14 RX ADMIN — OMEPRAZOLE 20 MG: 20 CAPSULE, DELAYED RELEASE ORAL at 06:52

## 2021-01-14 RX ADMIN — ACETAMINOPHEN 650 MG: 325 TABLET, FILM COATED ORAL at 10:45

## 2021-01-14 RX ADMIN — SODIUM CHLORIDE 500 ML: 9 INJECTION, SOLUTION INTRAVENOUS at 17:52

## 2021-01-14 RX ADMIN — METHOCARBAMOL 500 MG: 500 TABLET ORAL at 06:53

## 2021-01-14 RX ADMIN — FLUTICASONE PROPIONATE 1 SPRAY: 50 SPRAY, METERED NASAL at 08:29

## 2021-01-14 NOTE — PLAN OF CARE
Pt alert and oriented x4. Able to make needs known. Denies, cp or SOB. L shoulder and lower back pain managed w/ PRN robaxin and tylenol. MOD I. Midline intact, IV abx infused w/o difficulties. LBM today per pt, declined scheduled senna. Call light in reach, continue w/ POC.      Patient's most recent vital signs are:     Vital signs:  BP: 131/79  Temp: 98  HR: 92  RR: 18  SpO2: 95 %     Patient does not have new respiratory symptoms.  Patient does not have new sore throat.  Patient does not have a fever greater than 99.5.

## 2021-01-14 NOTE — DISCHARGE INSTRUCTIONS
Follow up Appointments    - Primary Care  You are scheduled to see Dr. Jain on Friday, January 22nd at 10:10 AM.     Address  [facility]                          [address]                          [place] [address]  Phone   [phone]    - Neurology in 6 weeks - ongoing seizure tx eval  You are scheduled to see Dr. Conrad on Thursday, February 25th at 1:30 PM.    Address [facility]                          [address], [address]                          [place] [address]  Phone  [phone]      ----------------------------      [facility] [phone] will provide home IV antibiotics and RN visits related to infusion therapy. Vi RN Liaison Mini Foreman can be reached at [phone].

## 2021-01-14 NOTE — PLAN OF CARE
FOCUS/GOAL  Bowel management, Bladder management, Nutrition/Feeding/Swallowing precautions, Pain management, Mobility, Skin integrity, Cognition/Memory/Judgment/Problem solving and Equipment/Assistive devices    ASSESSMENT, INTERVENTIONS AND CONTINUING PLAN FOR GOAL:  Pt is alert and oriented x4. VSS, sat 94% RA. Mod I. Continent of bowel and bladder; LBM 01/13 - denies scheduled senna. Denies of SOB, chest pain, headache and nausea. Managed pain w/ PRN Tylenol x2 and Robaxin x1. Midline CDI; no blood return noted, no heparin orders; added on sticky. Participated in therapies. Call light within reach and able to make needs known.

## 2021-01-14 NOTE — DISCHARGE SUMMARY
[facility]   Acute Rehabilitation Unit  Discharge summary     Date of Admission: 1/7/2021  Date of Discharge: 1/15/2021  Disposition: Home  Primary Care Physician: No Ref-Primary, Physician  Attending physician: Rebecca Ricardo MD  Other significant physician provider(s): none      DISCHARGE DIAGNOSIS      Bacterial Meningitis. Impairment group code: 02.1    Seizures, urinary retention(resolved)      BRIEF SUMMARY from [facility]  Stevie Dotson is a 65 year old man with past medical history of acid reflux and asplenia who presented 12/30/20 with altered mental status and fever he underwent LP which was concerning for bacterial meningitis. MRI with decreased attenuation of R hemispheric vessels and evidence of purulent material in his ventricles and leptomeninges.  He was started on IV ampicillin, ceftriaxone, vanco, acyclovir and dexamethasone in ED. Lactate was 2.9 and received IV fluid with improvement in lactate. CSF cultures positive for Strep Pneumoniae, continued on ceftriaxone. VEEG with severe diffuse encephalopathy and cortical dysfunction over R hemisphere. Had an episode of L arm stiffening prior to intubation c/f seizure, started on Keppra with recommendation for outpatient neurology follow up for keppra taper.       REHABILITATION COURSE  Stevie Dotson was admitted to the [facility] acute inpatient rehabilitation unit on 1/7/2021. During admission, he participated in PT, OT, and speech therapies for 3 hrs/day. During his stay he had no major medical complications    MEDICAL COURSE  #Strep pneumoniae meningitis-  # Encephalopathy-  presented with AMS and fever, Started on broad spectrum antimicrobials and steroids. CSF cultures & blood cultures positive for step pneumo. MRI/A showed decreased attenuation of R hemispheric vessels and evidence of purulent material in his ventricles and leptomeninges explains the presentation.   -weekly labs- per ID recs- (CBC  w/ diff, BMP, SGOT)   -CBC/BMP ordered on 1/14/2021.  -continue 2 additional weeks of ceftriaxone (from 1/6/21) ending on 1/21/2021  -continue PT/OT/SLP     #Possible seizures -Right hemispheric focal slowing and transient left arm stiffening at presentation worrisome for seizure, but no seizures or epileptiform discharges captured on EEG  -continue keppra  -follow up neurology      #Hyperglycemia- Hgb a1C 6.4%  consistent with pre- diabetes, was on insulin/ssi when on steroids bg improved with completion of steroids  -routine monitoring with labs  -follow up pcp     # Leucocytosis- in setting of infection and 4 day course of steroids- now down trending WBC 18 1/7  -We will continue to monitor.  -CBC/BMP ordered on 1/14/2021.     #Urinary retention (neurogenic bladder?)- with ervin catheter removed 1/5 with retention and replaced 1/6.   -Ervin removed 1/12 and has been able to void with low PVRs     #History of Alcohol Use- reportedly ~12 beers weekly per wife report patient reports a few beers on weeknds, no signs of withdrawal     #Polyneuropathy?: reports 20 year h/o paresthesia in feet (mostly toes and the plantar aspects). His symptoms have been clearly worse since hospitalization. Never had any work-up or formal diagnosis. Likely secondary to alcohol use vs small fiber given the very slow course. May benefit from basic blood work and NCS/EMG as outpatient after discharge.      #L shoulder pain  -Likely muscular vs. Impingement.  XR done showed no acute bony pathology  -Encourage Tylenol, will add Icy Hot.  Continue therapies.  -Added Robaxin PRN on 1/13/2021    Discharge date 1/15/2021 with progress.  Will continue with HH therapies.       Issues to follow up on Discharge: none      DISCHARGE MEDICATIONS  Current Discharge Medication List      START taking these medications    Details   methocarbamol (ROBAXIN) 500 MG tablet Take 1 tablet (500 mg) by mouth 4 times daily as needed for muscle spasms or other  (Muscle tightness)  Qty: 28 tablet, Refills: 0    Associated Diagnoses: Meningitis         CONTINUE these medications which have CHANGED    Details   acetaminophen (TYLENOL) 325 MG tablet Take 2 tablets (650 mg) by mouth every 4 hours as needed for mild pain  Qty: 30 tablet, Refills: 0    Associated Diagnoses: Meningitis      cefTRIAXone (ROCEPHIN) 1 GM vial Inject 2 g (2,000 mg) into the vein every 12 hours for 6 days CBC with differential, creatinine, SGOT weekly while on this medication to be faxed to Dr. Ramirez office.  Qty: 240 mL, Refills: 0    Associated Diagnoses: Meningitis      fluticasone (FLONASE) 50 MCG/ACT nasal spray Spray 1 spray into both nostrils 2 times daily  Qty: 9.9 mL, Refills: 0    Associated Diagnoses: Meningitis      levETIRAcetam (KEPPRA) 1000 MG tablet Take 1 tablet (1,000 mg) by mouth 2 times daily  Qty: 60 tablet, Refills: 0    Associated Diagnoses: Meningitis      omeprazole (PRILOSEC) 20 MG DR capsule Take 1 capsule (20 mg) by mouth daily  Qty: 30 capsule, Refills: 0    Associated Diagnoses: Meningitis         STOP taking these medications       Heparin Sodium, Porcine, (HEPARIN ANTICOAGULANT) 5000 UNIT/0.5ML injection Comments:   Reason for Stopping:         sildenafil (VIAGRA) 100 MG tablet Comments:   Reason for Stopping:                 DISCHARGE INSTRUCTIONS AND FOLLOW UP  Discharge Procedure Orders   Physical Therapy Referral   Standing Status: Future   Referral Priority: Routine Referral Type: Rehab Therapy Physical Therapy   Number of Visits Requested: 1     Occupational Therapy Referral   Standing Status: Future   Referral Priority: Routine Referral Type: Occupational Therapy   Number of Visits Requested: 1     Home infusion referral   Referral Priority: Routine   Number of Visits Requested: 1     Reason for your hospital stay   Order Comments: Meningitis     Follow Up and recommended labs and tests   Order Comments: Neurology in 4 weeks, PCP in 1 week.     Activity   Order  Comments: --Activity as tolerated in moderation and in accordance with the safety precautions instructed by your therapists   --No driving until approved by a physician familiar with your medical complexity   --Recommend formal 's evaluation prior to returning to driving.  Please discuss with your occupational therapist or PM&R physician for further details.     Order Specific Question Answer Comments   Is discharge order? Yes      Full Code     Order Specific Question Answer Comments   Code status determined by: Discussion with patient/ legal decision maker      Diet   Order Comments: Follow this diet upon discharge: Orders Placed This Encounter      Snacks/Supplements Adult: Other; Manager check - consistently missing items on meal trays; With Meals      Combination Diet Regular Diet Adult     Order Specific Question Answer Comments   Is discharge order? Yes           PHYSICAL EXAMINATION    Most recent Vital Signs:   Vitals:    01/13/21 1630 01/14/21 0833 01/14/21 2039 01/15/21 0740   BP: 131/79 129/74 123/78 118/75   BP Location: Left arm Left arm Left arm Left arm   Pulse: 92 80 76 73   Resp: 18 16 18 16   Temp: 98  F (36.7  C) 97.2  F (36.2  C) 98.8  F (37.1  C) 96.5  F (35.8  C)   TempSrc: Oral Oral Oral Oral   SpO2: 95% 94% 95% 93%   Weight:           GEN: Alert, awake, not in acute distress, sitting comfortably in bed  HEENT: Neck supple, extraocular movements intact.  CVS: S1-S2 present, regular, rate, rhythm no murmur.  PULM: Clear to auscultation bilaterally, breathing comfortably on room air.  ABD: Soft, nontender, nondistended, bowel sounds.  EXT: No LE edema or calf tenderness b/l.  Neuro: Answers appropriately, follows commands.  Moving all 4 extremities independently. Strength 5/5 in bilateral upper and lower extremities. FNF intact bilaterally.       Discharge summary was forwarded to No Ref-Primary, Physician (PCP) at the time of discharge, so as to bridge from hospital to outpatient care.      It was our pleasure to care for Stevie Dotson during this hospitalization. Please do not hesitate to contact me should there be questions regarding the hospital course or discharge plan.      Patient was seen and discussed with my staff physician Dr. Ricardo, who agrees with my assessment and plan.    Tavo Guillen   PGY 2  Physical Medicine and Rehabilitation  Pager: [phone]

## 2021-01-14 NOTE — PLAN OF CARE
Discharge Planner Post-Acute Rehab SLP:      Discharge Plan:home with assist     Precautions: fall     Current Status:  Communication: WNL  Cognition: mild deficits  Swallow: no problems reported     Assessment: Completed a written reasoning an problem solving task with 100% accuracy independently and with mildly increased time to complete. Completed a ST recall task- paragraph level- using strategy of verbal rehearsal- pt at 5/5 accuracy. PT is d/cing to home tomorrow in the afternoon- No further sptx is recommended following d/c  Other Barriers to Discharge (Family Training, etc): TBD

## 2021-01-14 NOTE — PROGRESS NOTES
VS: /79 (BP Location: Left arm)   Pulse 92   Temp 98  F (36.7  C) (Oral)   Resp 18   Wt 107 kg (236 lb)   SpO2 95%   BMI 31.14 kg/m   Alert and oriented. Denies shortness of breath, chest pain, dizziness, lightheadedness and nausea and vomiting.   O2: Room air sat. > 90%.    Output: Voiding adequate amount in bathroom. Continent of B/B   Last BM: 1/13/21   Activity: Mod I with walker.   Skin: Intact.   Pain: Tolerating with Tylenol.   CMS: CMS and neuro intact.Numbness BLE   Dressing: None   Diet: Regular   LDA: PIV R arm SL   Equipment: Walker, urinal, and personal belongings.   Plan: Continue with plan of care. Call light within reach. Able to make needs known.   Additional Info: Possible discharge 1/15/21

## 2021-01-14 NOTE — PROGRESS NOTES
"  General acute hospital   Acute Rehabilitation Unit  Daily progress note    INTERVAL HISTORY  Stevie seen and examined at the bedside this morning sitting comfortably in bed.  He said that his pain in his lower back, left shoulder was relieved by taking Robaxin.  He denies any new complaints.    Denies any nausea, vomiting, headache, chest pain, shortness of breath, abdominal pain, difficulty urinating.    Functionally  As per PT notes  Transfer: Daron  Bed Mobility: Daron  Gait: amb 400' FWW with SBA  Stairs: up/down 3x6\" steps, B rails, CGA. Able to safely perform 6\" curb step up with FWW and SBA/CGA  Balance: Fair static and dynamic standing balance. TOMAS 1/9/21: 31/56    MEDICATIONS  Scheduled:    cefTRIAXone  2 g Intravenous Q12H     fluticasone  1 spray Both Nostrils BID     levETIRAcetam  1,000 mg Oral BID     omeprazole  20 mg Oral QAM AC     senna-docusate  1 tablet Oral BID     sodium chloride (PF)  10 mL Intracatheter Q8H        PRN:  acetaminophen, bisacodyl, lidocaine 4%, lidocaine (buffered or not buffered), melatonin, menthol (Topical Analgesic) 2.5%, methocarbamol, polyethylene glycol, sodium chloride (PF)       PHYSICAL EXAM  Patient Vitals for the past 24 hrs:   BP Temp Temp src Pulse Resp SpO2   01/14/21 0833 129/74 97.2  F (36.2  C) Oral 80 16 94 %   01/13/21 1630 131/79 98  F (36.7  C) Oral 92 18 95 %       GEN: Alert, oriented, sitting comfortably in bed, not in acute distress.  HEENT: Neck supple, extraocular movements intact.  CVS: S1-S2 present, regular, rate, rhythm no murmur.  PULM: Clear to auscultation bilaterally, breathing comfortably on room air.  ABD: Soft, nontender, nondistended, bowel sounds.  EXT: No LE edema or calf tenderness b/l.  Neuro: Answers appropriately, follows commands.  Moving all 4 extremities independently.    LABS  CBC RESULTS:   Lab Results   Component Value Date    WBC 18.0 01/07/2021     Lab Results   Component Value Date    RBC 4.47 " 01/07/2021     Lab Results   Component Value Date    HGB 13.1 01/07/2021     Lab Results   Component Value Date    HCT 40.5 01/07/2021     Lab Results   Component Value Date    MCV 91 01/07/2021     Lab Results   Component Value Date    MCH 29.3 01/07/2021     Lab Results   Component Value Date    MCHC 32.3 01/07/2021     Lab Results   Component Value Date    RDW 13.7 01/07/2021     Lab Results   Component Value Date     01/07/2021         Last Comprehensive Metabolic Panel:  Sodium   Date Value Ref Range Status   01/07/2021 137 133 - 144 mmol/L Final     Potassium   Date Value Ref Range Status   01/07/2021 3.9 3.4 - 5.3 mmol/L Final     Chloride   Date Value Ref Range Status   01/07/2021 106 94 - 109 mmol/L Final     Carbon Dioxide   Date Value Ref Range Status   01/07/2021 27 20 - 32 mmol/L Final     Anion Gap   Date Value Ref Range Status   01/07/2021 4 3 - 14 mmol/L Final     Glucose   Date Value Ref Range Status   01/07/2021 118 (H) 70 - 99 mg/dL Final     Urea Nitrogen   Date Value Ref Range Status   01/07/2021 22 7 - 30 mg/dL Final     Creatinine   Date Value Ref Range Status   01/07/2021 0.82 0.66 - 1.25 mg/dL Final     GFR Estimate   Date Value Ref Range Status   01/07/2021 >90 >60 mL/min/[1.73_m2] Final     Comment:     Non  GFR Calc  Starting 12/18/2018, serum creatinine based estimated GFR (eGFR) will be   calculated using the Chronic Kidney Disease Epidemiology Collaboration   (CKD-EPI) equation.       Calcium   Date Value Ref Range Status   01/07/2021 8.2 (L) 8.5 - 10.1 mg/dL Final       No results for input(s): GLC, BGM in the last 168 hours.    IMPRESSION/PLAN:  Stevie Dotson is a 65 year old man who presented with altered mental status and fever diagnosed with meningitis admitted to acute rehab 1/7/21 for ongoing rehabilitation and medical management.      Admission to acute inpatient rehab bacterial meningitis  Impairment group code: 02.1        1. PT, OT and SLP 60  minutes of each on a daily basis, in addition to rehab nursing and close management of physiatrist.       2. Impairment of ADL's: Noted to have impaired strength, impaired activity tolerance, and impaired cognition will benefit from ongoing OT with goal for MOD I with basic adls.      3. Impairment of mobility:  Noted to have impaired strength, and impaired activity tolerance will benefit from ongoing PT with goal for MOD I with basic mobility.         4. Impairment of cognition/language/swallow:  Noted to have impaired swallow, cognition, and vocal hoarseness will benefit from ongoing SLP with goal to improve ability to communicate basic needs.     Medical Conditions  #Strep pneumoniae meningitis-  # Encephalopathy-  presented with AMS and fever, Started on broad spectrum antimicrobials and steroids. CSF cultures & blood cultures positive for step pneumo. MRI/A showed decreased attenuation of R hemispheric vessels and evidence of purulent material in his ventricles and leptomeninges explains the presentation.   -weekly labs- per ID recs- (CBC w/ diff, BMP, SGOT)   -CBC/BMP ordered on 1/14/2021.  -continue 2 additional weeks of ceftriaxone (from 1/6/21)  -continue PT/OT/SLP     #Possible seizures -Right hemispheric focal slowing and transient left arm stiffening at presentation worrisome for seizure, but no seizures or epileptiform discharges captured on EEG  -continue keppra  -follow up neurology      #Hyperglycemia- Hgb a1C 6.4%  consistent with pre- diabetes, was on insulin/ssi when on steroids bg improved with completion of steroids  -routine monitoring with labs  -follow up pcp     # Leucocytosis- in setting of infection and 4 day course of steroids- now down trending WBC 18 1/7  -We will continue to monitor.  -CBC/BMP ordered on 1/14/2021.     #Urinary retention (neurogenic bladder?)- with ervin catheter removed 1/5 with retention and replaced 1/6.   -Ervin removed 1/12 and has been able to void with low  PVRs     #History of Alcohol Use- reportedly ~12 beers weekly per wife report patient reports a few beers on weeknds, no signs of withdrawal     #Polyneuropathy?: reports 20 year h/o paresthesia in feet (mostly toes and the plantar aspects). His symptoms have been clearly worse since hospitalization. Never had any work-up or formal diagnosis. Likely secondary to alcohol use vs small fiber given the very slow course. May benefit from basic blood work and NCS/EMG as outpatient after discharge.     #L shoulder pain  -Likely muscular vs. Impingement.  XR done showed no acute bony pathology  -Encourage Tylenol, will add Icy Hot.  Continue therapies.  -Add Robaxin PRN       1. Adjustment to disability:  Clinical psychology to eval and treat as needed  2. FEN: reg  3. Bowel: continent  4. Bladder: Continent  5. DVT Prophylaxis: Discontinued Heparin given adequate ambulation distances  6. GI Prophylaxis: ppi  7. Code: full  8. Disposition: home  9. ELOS: Tentative discharge date 1/15/21 after therapies  10. Rehab prognosis:  good  11. Follow up Appointments on Discharge: neurology, PCP.  Will need IV antibiotics until 1/20/21.  Care coordinator consulted.     Patient was seen and discussed with my staff physician Dr. Ricardo, who agrees with my assessment and plan.    Tavo Guillen   PGY 2  Physical Medicine and Rehabilitation  Pager: [phone]

## 2021-01-14 NOTE — PROGRESS NOTES
Prior Authorization **INITIATED**    Medication: Methocarbamol 500mg tabs  Insurance Company: [facility] - Phone [phone] Fax [phone]  Pharmacy Filling the Rx: [facility] - [place], [place] - [address]  Filling Pharmacy Phone: [phone]  Filling Pharmacy Fax: [phone]  Start Date: 1/14/2021  Reference #: CoverMyMeds Key: [id number]  Comments:  n/a      Teresa Olsen CPhT  [place] Discharge Pharmacy Liaison  Pronouns: She/Her/Hers    [facility]  [address]  [address] [address]   [email or web address]  [email or web address]   Phone: [phone]  Pager: [phone]  Fax: [phone]

## 2021-01-14 NOTE — PLAN OF CARE
Discharge Planner Post-Acute Rehab OT:      Discharge Plan: home with spouse, HH vs OP OT pending progress     Precautions: fall, L shoulder pain     Current Status:  ADLs: Mod I in room with FWW, supervision for bathing except needed assist to wash feet.   IADLs: CGA-SBA with FWW for kitchen mobility using compensatory strategies for LUE pain- does all reaching with RUE    Assessment: Great progress towards goals, pt mod I in room and on track for discharge tomorrow after therapy. Pt reported he had no L shoulder pain last night as a result of yesterday's LUE strengthening/stretching, pt achieved 90* L shoulder flexion today d/t decrease pain. Spouse is coming at noon tomorrow for nursing training but no therapy family training is needed    Other Barriers to Discharge (DME, Family Training, etc):   Spouse is RN, able to provide assist as needed. Has shower chair, will get FWW from Replaced by [facility]. Educate client on pain management techniques such as self massage and heat modalities to promote function.

## 2021-01-15 ENCOUNTER — APPOINTMENT (OUTPATIENT)
Dept: OCCUPATIONAL THERAPY | Facility: CLINIC | Age: 66
End: 2021-01-15
Payer: COMMERCIAL

## 2021-01-15 ENCOUNTER — APPOINTMENT (OUTPATIENT)
Dept: PHYSICAL THERAPY | Facility: CLINIC | Age: 66
End: 2021-01-15
Payer: COMMERCIAL

## 2021-01-15 ENCOUNTER — APPOINTMENT (OUTPATIENT)
Dept: EDUCATION SERVICES | Facility: CLINIC | Age: 66
End: 2021-01-15
Attending: PHYSICAL MEDICINE & REHABILITATION
Payer: COMMERCIAL

## 2021-01-15 ENCOUNTER — APPOINTMENT (OUTPATIENT)
Dept: SPEECH THERAPY | Facility: CLINIC | Age: 66
End: 2021-01-15
Payer: COMMERCIAL

## 2021-01-15 VITALS
WEIGHT: 236 LBS | TEMPERATURE: 96.5 F | HEART RATE: 73 BPM | DIASTOLIC BLOOD PRESSURE: 75 MMHG | OXYGEN SATURATION: 93 % | RESPIRATION RATE: 16 BRPM | BODY MASS INDEX: 31.14 KG/M2 | SYSTOLIC BLOOD PRESSURE: 118 MMHG

## 2021-01-15 PROCEDURE — 97129 THER IVNTJ 1ST 15 MIN: CPT | Performed by: SPEECH-LANGUAGE PATHOLOGIST

## 2021-01-15 PROCEDURE — 97530 THERAPEUTIC ACTIVITIES: CPT | Mod: GP

## 2021-01-15 PROCEDURE — 97110 THERAPEUTIC EXERCISES: CPT | Mod: GO

## 2021-01-15 PROCEDURE — 250N000013 HC RX MED GY IP 250 OP 250 PS 637: Performed by: PHYSICIAN ASSISTANT

## 2021-01-15 PROCEDURE — 99238 HOSP IP/OBS DSCHRG MGMT 30/<: CPT | Mod: GC | Performed by: PHYSICAL MEDICINE & REHABILITATION

## 2021-01-15 PROCEDURE — 250N000011 HC RX IP 250 OP 636: Performed by: STUDENT IN AN ORGANIZED HEALTH CARE EDUCATION/TRAINING PROGRAM

## 2021-01-15 PROCEDURE — 97130 THER IVNTJ EA ADDL 15 MIN: CPT | Performed by: SPEECH-LANGUAGE PATHOLOGIST

## 2021-01-15 PROCEDURE — 250N000013 HC RX MED GY IP 250 OP 250 PS 637: Performed by: PHYSICAL MEDICINE & REHABILITATION

## 2021-01-15 RX ORDER — HEPARIN SODIUM,PORCINE 10 UNIT/ML
3 VIAL (ML) INTRAVENOUS ONCE
Qty: 3 ML | Refills: 0 | Status: SHIPPED | OUTPATIENT
Start: 2021-01-15 | End: 2021-01-15

## 2021-01-15 RX ORDER — CEFTRIAXONE 1 G/1
2000 INJECTION, POWDER, FOR SOLUTION INTRAMUSCULAR; INTRAVENOUS EVERY 12 HOURS
Qty: 240 ML | Refills: 0
Start: 2021-01-15 | End: 2021-01-22

## 2021-01-15 RX ORDER — HEPARIN SODIUM,PORCINE 10 UNIT/ML
3 VIAL (ML) INTRAVENOUS ONCE
Status: DISCONTINUED | OUTPATIENT
Start: 2021-01-15 | End: 2021-01-15 | Stop reason: HOSPADM

## 2021-01-15 RX ADMIN — METHOCARBAMOL 500 MG: 500 TABLET ORAL at 06:08

## 2021-01-15 RX ADMIN — ACETAMINOPHEN 650 MG: 325 TABLET, FILM COATED ORAL at 06:08

## 2021-01-15 RX ADMIN — ACETAMINOPHEN 650 MG: 325 TABLET, FILM COATED ORAL at 11:07

## 2021-01-15 RX ADMIN — FLUTICASONE PROPIONATE 1 SPRAY: 50 SPRAY, METERED NASAL at 08:10

## 2021-01-15 RX ADMIN — METHOCARBAMOL 500 MG: 500 TABLET ORAL at 11:07

## 2021-01-15 RX ADMIN — OMEPRAZOLE 20 MG: 20 CAPSULE, DELAYED RELEASE ORAL at 06:08

## 2021-01-15 RX ADMIN — CEFTRIAXONE SODIUM 2 G: 2 INJECTION, POWDER, FOR SOLUTION INTRAMUSCULAR; INTRAVENOUS at 06:09

## 2021-01-15 RX ADMIN — LEVETIRACETAM 1000 MG: 500 TABLET, FILM COATED ORAL at 08:09

## 2021-01-15 NOTE — PLAN OF CARE
Pt alert and oriented x4. Able to make needs known. Denies, cp or SOB. L shoulder and lower back pain managed w/ PRN robaxin and tylenol. MOD I w/ walker. Cont of bowel and bladder. Midline intact, IV abx infused w/o difficulties. RN offered to change midline dressing but pt requested for it to be changed in the am before he goes. Call light in reach, continue w/ POC.      Patient's most recent vital signs are:     Vital signs:  BP: 123/78  Temp: 98.8  HR: 76  RR: 18  SpO2: 95 %     Patient does not have new respiratory symptoms.  Patient does not have new sore throat.  Patient does not have a fever greater than 99.5.

## 2021-01-15 NOTE — PLAN OF CARE
Physical Therapy Discharge Summary    Reason for therapy discharge:    All goals and outcomes met, no further needs identified.    Progress towards therapy goal(s). See goals on Care Plan in [facility] electronic health record for goal details.  Goals met    Therapy recommendation(s):    Continued therapy is recommended.  Rationale/Recommendations:  recommend OP PT at [facility] to continue to progress standing dynamic balance, strength, endurance, and wean off assistive device as able. Will also need manual therapy and strengthening/ROM for L shoulder d/t L ant subluxation of humeral head.    Summary - mod I in room with FWW. Up/down steps with rail + SPC mod I. Working on post/inf mobs to L shld with isometric L rot cuff strengthening to manage pain. Pt improved ROM in L shld, able to perform shld flex to ~70* at discharge.     Outcome Measures:  Christian -   1/9 - 31/56    DME - issued FWW from [facility]

## 2021-01-15 NOTE — PROGRESS NOTES
Prior Authorization **APPROVED**    Authorization Effective Date: 12/15/2020  Authorization Expiration Date: 1/14/2022  Medication: Methocarbamol 500mg tabs **APPROVED**  Approved Dose/Quantity: Up to 4 tablets daily  Reference #: CoverMyMeds Key: [id number], Case ID: [id number]   Insurance Company: [employer] - Phone [phone] Fax [phone]  Expected CoPay: $4.19     CoPay Card Available: No    Foundation Assistance Needed: n/a  Which Pharmacy is filling the prescription (Not needed for infusion/clinic administered): [facility] [place], [place] - [address]  Pharmacy Notified: Yes  Patient Notified: Yes  Comments:  n/a          Teresa Olsen CPhT  [place] Discharge Pharmacy Liaison  Pronouns: She/Her/Hers    [facility]  [address]  [address] [address]   [email or web address]  [email or web address]   Phone: [phone]  Pager: [phone]  Fax: [phone]

## 2021-01-15 NOTE — CONSULTS
01/15/21 3435 Lyn Roberson, RN     Wife attended PICC/IV medication class. RD correctly on a model with all skills including cap change, flushing and administering IV ABX via IV push. Wife is an RN and is very comfortable with all cares. States she understands all information presented.   Literature given: Handwashing and Skin Care, Caring for Your PICC at Home, Changing the End Cap, Flushing the Line with Heparin, Saline or Citrate, and Instructions for IV Push Medicine.

## 2021-01-15 NOTE — PLAN OF CARE
FOCUS/GOAL  Bowel management, Bladder management, Nutrition/Feeding/Swallowing precautions, Pain management, Medical management, Discharge planning, Mobility, Skin integrity and Cognition/Memory/Judgment/Problem solving    ASSESSMENT, INTERVENTIONS AND CONTINUING PLAN FOR GOAL:  Pt is alert and oriented x4. VSS, sat 93% RA. MOD I in room. Continent of bowel and bladder; LBM 01/14, declined scheduled senna. Managed shoulder and lower back pain PRN Tylenol x1 and Robaxin x1; declined Vladimir-Solis ointment. Left single lumen midline dressing changed; cap changed, saline locked, no blood return; already on sticky and Charge aware. Skin intact. Reviewed discharge paperwork and medications with wife and pt. Pt's wife inquired about heparin orders, per Pharm, outpt/infusion RN will administer as they will not discharge pt with it. Wife aware. Wife is an RN for 40 years. Wife participated in PLC re midline care/medication. Discharged pt with wife to home at 1300.

## 2021-01-15 NOTE — PLAN OF CARE
Alert and oriented x4.  Medicated with prn Tylenol x1 this morning per request for shoulder and lower back pain. IV antibiotic infused as per orders without problems. Reported no problems with B/B. Plan in place for patient to discharge to home today.        Patient's most recent vital signs are:     Vital signs:  BP: 123/78  Temp: 98.8  HR: 76  RR: 18  SpO2: 95 %     Patient does not have new respiratory symptoms.  Patient does not have new sore throat.  Patient does not have a fever greater than 99.5.

## 2021-01-15 NOTE — PLAN OF CARE
Speech Language Therapy Discharge Summary    Reason for therapy discharge:    Discharged to home.    Progress towards therapy goal(s). See goals on Care Plan in [facility] electronic health record for goal details.  Goals met    Therapy recommendation(s):    No further therapy is recommended.SLP: pt worked on cognitive communication skills. Improvement noted, skills appear functional for needs, no further tx.

## 2021-01-22 ENCOUNTER — OFFICE VISIT (OUTPATIENT)
Dept: FAMILY MEDICINE | Facility: CLINIC | Age: 66
End: 2021-01-22
Payer: COMMERCIAL

## 2021-01-22 ENCOUNTER — HOSPITAL ENCOUNTER (OUTPATIENT)
Dept: OCCUPATIONAL THERAPY | Facility: CLINIC | Age: 66
Setting detail: THERAPIES SERIES
End: 2021-01-22
Attending: STUDENT IN AN ORGANIZED HEALTH CARE EDUCATION/TRAINING PROGRAM
Payer: COMMERCIAL

## 2021-01-22 VITALS
HEART RATE: 80 BPM | OXYGEN SATURATION: 100 % | SYSTOLIC BLOOD PRESSURE: 110 MMHG | WEIGHT: 222 LBS | DIASTOLIC BLOOD PRESSURE: 60 MMHG | BODY MASS INDEX: 29.29 KG/M2 | TEMPERATURE: 97.4 F | RESPIRATION RATE: 12 BRPM

## 2021-01-22 DIAGNOSIS — G62.9 NEUROPATHY: ICD-10-CM

## 2021-01-22 DIAGNOSIS — Z11.59 NEED FOR HEPATITIS C SCREENING TEST: ICD-10-CM

## 2021-01-22 DIAGNOSIS — R73.03 PREDIABETES: ICD-10-CM

## 2021-01-22 DIAGNOSIS — G31.84 MILD COGNITIVE IMPAIRMENT, SO STATED: ICD-10-CM

## 2021-01-22 DIAGNOSIS — G03.9 MENINGITIS: ICD-10-CM

## 2021-01-22 DIAGNOSIS — M25.512 LEFT SHOULDER PAIN, UNSPECIFIED CHRONICITY: ICD-10-CM

## 2021-01-22 DIAGNOSIS — G03.9 MENINGITIS: Primary | ICD-10-CM

## 2021-01-22 LAB
ERYTHROCYTE [DISTWIDTH] IN BLOOD BY AUTOMATED COUNT: 13.6 % (ref 10–15)
HCT VFR BLD AUTO: 41.7 % (ref 40–53)
HCV AB SERPL QL IA: NONREACTIVE
HGB BLD-MCNC: 12.9 G/DL (ref 13.3–17.7)
MCH RBC QN AUTO: 29.1 PG (ref 26.5–33)
MCHC RBC AUTO-ENTMCNC: 30.9 G/DL (ref 31.5–36.5)
MCV RBC AUTO: 94 FL (ref 78–100)
PLATELET # BLD AUTO: 533 10E9/L (ref 150–450)
RBC # BLD AUTO: 4.44 10E12/L (ref 4.4–5.9)
VIT B12 SERPL-MCNC: 599 PG/ML (ref 193–986)
WBC # BLD AUTO: 9.9 10E9/L (ref 4–11)

## 2021-01-22 PROCEDURE — 84443 ASSAY THYROID STIM HORMONE: CPT | Performed by: FAMILY MEDICINE

## 2021-01-22 PROCEDURE — 90732 PPSV23 VACC 2 YRS+ SUBQ/IM: CPT | Performed by: FAMILY MEDICINE

## 2021-01-22 PROCEDURE — 36415 COLL VENOUS BLD VENIPUNCTURE: CPT | Performed by: FAMILY MEDICINE

## 2021-01-22 PROCEDURE — 82607 VITAMIN B-12: CPT | Performed by: FAMILY MEDICINE

## 2021-01-22 PROCEDURE — G0009 ADMIN PNEUMOCOCCAL VACCINE: HCPCS | Performed by: FAMILY MEDICINE

## 2021-01-22 PROCEDURE — 85027 COMPLETE CBC AUTOMATED: CPT | Performed by: FAMILY MEDICINE

## 2021-01-22 PROCEDURE — 84425 ASSAY OF VITAMIN B-1: CPT | Mod: 90 | Performed by: FAMILY MEDICINE

## 2021-01-22 PROCEDURE — 80053 COMPREHEN METABOLIC PANEL: CPT | Performed by: FAMILY MEDICINE

## 2021-01-22 PROCEDURE — 97165 OT EVAL LOW COMPLEX 30 MIN: CPT | Mod: GO | Performed by: OCCUPATIONAL THERAPIST

## 2021-01-22 PROCEDURE — 99000 SPECIMEN HANDLING OFFICE-LAB: CPT | Performed by: FAMILY MEDICINE

## 2021-01-22 PROCEDURE — 86803 HEPATITIS C AB TEST: CPT | Performed by: FAMILY MEDICINE

## 2021-01-22 PROCEDURE — 99214 OFFICE O/P EST MOD 30 MIN: CPT | Mod: 25 | Performed by: FAMILY MEDICINE

## 2021-01-22 RX ORDER — METHOCARBAMOL 500 MG/1
500 TABLET, FILM COATED ORAL 4 TIMES DAILY PRN
Qty: 30 TABLET | Refills: 1 | Status: SHIPPED | OUTPATIENT
Start: 2021-01-22 | End: 2021-02-23

## 2021-01-22 ASSESSMENT — ENCOUNTER SYMPTOMS
FEVER: 0
ABDOMINAL PAIN: 0
NUMBNESS: 0
CONFUSION: 0
DIZZINESS: 0
SHORTNESS OF BREATH: 0
WEAKNESS: 1

## 2021-01-22 ASSESSMENT — ACTIVITIES OF DAILY LIVING (ADL)
IADL_QUICK_ADDS: MEAL PLANNING/PREPARATION;HOME/FINANCIAL/MANAGEMENT;COMMUNICATION/COMPUTER USE;COMMUNITY MOBILITY;CARE OF OTHERS

## 2021-01-22 NOTE — PROGRESS NOTES
Assessment & Plan     Meningitis  Meningoencephalitis likely secondary to strep pneumo, resolved.  Status post completion of antibiotics including IV ampicillin, Rocephin, vancomycin, acyclovir and dexamethasone.  Recheck labs.  Per his wife who is an RN, had significant improvement and is reaching his baseline function.  He is still ambulating with a walker for safety and has formal evaluation and treatment by PT and OT.  Neurology referral in place, has follow-up visit, up in a month.  He is on Keppra for neuro protection.  During hospitalization patient did have a EEG x5 days with consistent moderate to severe encephalopathy with cortical dysfunction in the right hemisphere.  He has no significant focal neurological decline today.  Previously received Prevnar 13, Pneumovax 23 updated today.  No driving until formal neurology evaluation.  - CBC with platelets  - Comprehensive metabolic panel (BMP + Alb, Alk Phos, ALT, AST, Total. Bili, TP)    Neuropathy  History of alcohol use.  Check B vitamins and TSH.  Monitor prediabetes.  - TSH with free T4 reflex  - Vitamin B1  - Vitamin B1 whole blood    Mild cognitive impairment, so stated   - TSH with free T4 reflex    Prediabetes  History of prediabetes, given his corticosteroid use, monitor for iatrogenic diabetes mellitus.  - **A1C FUTURE anytime; Future    Need for hepatitis C screening test  - Hepatitis C Screen Reflex to HCV RNA Quant and Genotype    Left Shoulder Pain  - methocarbamol (ROBAXIN) 500 MG tablet; Take 1 tablet (500 mg) by mouth 4 times daily as needed for muscle spasms or other (Muscle tightness)      Return in about 1 month (around 2/22/2021) for followup of meningitis.    Norman Jain MD  [facility] Center[facility] is a 65 year old who presents to clinic today for the following health issues  accompanied by his spouse:    [facility]         Hospital Follow-up Visit:    Hospital/Nursing Home/IP Rehab Facility: [facility]  Date of Admission: 12/30/20  Date of Discharge: 1/7/21  Reason(s) for Admission: encephalitis, meningitis      Was your hospitalization related to COVID-19? No   Problems taking medications regularly:  None  Medication changes since discharge: None  Problems adhering to non-medication therapy:  None  [facility] discharge summary reviewedSummary of hospitalization:  [facility] discharge summary reviewed  Diagnostic Tests/Treatments reviewed.  Follow up needed: Yes.  Neurology, PT, OT.  Other Healthcare Providers Involved in Patient s Care:         Specialist appointment -    Update since discharge: improved.  Accompanied by wife.  Ambulating with a walker, feeding, dressing, grooming independently.  No symptoms of urinary retention.  Feels mentation is nearly at baseline.  Continues to have left shoulder pain, requesting Robaxin refill.  Post Discharge Medication Reconciliation: discharge medications reconciled, continue medications without change.   Plan of care communicated with patient              Future Appointments   Date Time Provider Department Center   1/22/2021 10:30 AM Norman Jain MD [facility]   1/22/2021  1:30 PM Heather Morgan, SHEREEN [facility] [place] [facility]   1/29/2021  3:15 PM Carline Parekh, PT [facility] [place] [facility]   2/25/2021  1:30 PM Thang Barriga MD [facility]     Appointment Notes for this encounter:   Reviewed- [facility] f/u    Health Maintenance Due   Topic Date Due     ADVANCE CARE PLANNING  1955     ZOSTER IMMUNIZATION (1 of 2) 09/08/2005     MEDICARE ANNUAL WELLNESS VISIT  09/08/2020     FALL RISK ASSESSMENT  09/08/2020     AORTIC ANEURYSM SCREENING (SYSTEM ASSIGNED)  09/08/2020     Health Maintenance addressed:  NONE        MyChart Status:  Not Active Signed pt up for MyChart      Review of Systems   Constitutional: Negative for fever.   Respiratory: Negative for shortness of breath.    Gastrointestinal: Negative for abdominal pain.    Neurological: Positive for weakness. Negative for dizziness and numbness.   Psychiatric/Behavioral: Negative for confusion.          Objective    /60 (Cuff Size: Adult Large)   Pulse 80   Temp 97.4  F (36.3  C) (Oral)   Resp 12   Wt 100.7 kg (222 lb)   SpO2 100%   BMI 29.29 kg/m    Body mass index is 29.29 kg/m .  Physical Exam  Vitals signs reviewed.   Constitutional:       Appearance: Normal appearance.   HENT:      Mouth/Throat:      Mouth: Mucous membranes are moist.      Pharynx: Oropharynx is clear.   Eyes:      Extraocular Movements: Extraocular movements intact.      Pupils: Pupils are equal, round, and reactive to light.   Neck:      Musculoskeletal: Normal range of motion. No muscular tenderness.   Cardiovascular:      Rate and Rhythm: Normal rate.   Pulmonary:      Effort: Pulmonary effort is normal.      Breath sounds: Normal breath sounds.   Abdominal:      General: Abdomen is flat.      Palpations: Abdomen is soft.      Tenderness: There is no abdominal tenderness.   Neurological:      General: No focal deficit present.      Mental Status: He is alert.   Psychiatric:         Mood and Affect: Mood normal.         Behavior: Behavior normal.

## 2021-01-22 NOTE — PATIENT INSTRUCTIONS
Patient Education     Meningitis    Meningitis is inflammation of the lining around the brain and spinal cord. It s most often caused by germs that infect the fluid and lining.     Bacterial meningitis (caused by bacteria) is a serious illness that can lead to lasting problems. These include brain damage, hearing loss, and paralysis. When not treated quickly, it can be fatal, sometimes within days.    Viral meningitis (caused by a virus) is less serious than bacterial meningitis. Most people get better with supportive treatment.  What are the risk factors for meningitis?  Anyone can get this condition. These people are at greatest risk:    Children younger than 5    Older adults    People who have had their spleen removed    People who are more likely to come in contact with meningitis germs (such as children in , students in college dorms, and soldiers in  housing)  How does meningitis spread?    Droplets. Meningitis germs spread through the air in droplets when an infected person coughs, sneezes, laughs, or talks. You can breathe in the germs. Or, your hands can transfer the germs to your eyes, nose, or mouth.    Person-to-person. You can come in contact with the germs if you share food, a drinking glass, eating utensils, or a toothbrush with an infected person. Meningitis germs can also be spread through kissing.    Direct spread. The germs that cause meningitis can spread to the brain and spinal cord from an infection in another part of the body, such as the sinuses or ears.     Fecal-oral. People infected with viral meningitis have the virus in their stool. If they don t wash their hands well after using the bathroom, they can spread the germs to objects, such as telephones and doorknobs. If you touch the same objects, you can  the germs and then transfer them to your mouth.  What are the symptoms of meningitis?  Viral and bacterial meningitis share many of the same symptoms. Symptoms  start suddenly in both. You won t know which type of meningitis you have, so act quickly. Call your healthcare provider right away if you have a severe headache with any of the following:    Stiff neck    Fever    Confusion    Sleepiness    Seizures    Sensitivity to light    Nausea and vomiting  Note: Small children, the elderly, and occasional other people may not have headaches as an early symptoms of meningitis. Unexplained confusion even without headache can occasionally be due to meningitis.  How is meningitis diagnosed?  The following are tests used to diagnose meningitis:     Lumbar puncture (spinal tap). This is the best way to diagnose meningitis. The healthcare provider first injects a numbing medicine to ease pain. Then, a needle is inserted into the back to take a small sample of the fluid that surrounds the brain and spine.     Imaging tests. CT scans or MRI scans of the brain may be done to look for swelling and inflammation. Other CT scans and X-rays may be done to look for a source of the infection.    How is viral meningitis treated?  There are no medicines to treat most types of viral meningitis. It often resolves on its own in about a week. After you have had a medical evaluation the following may help your symptoms:    Rest in bed.    Drink plenty of fluids, such as water, juice, and warm soup, to prevent dehydration. A good rule is to drink enough so that you urinate your normal amount.    Ask the healthcare provider about over-the-counter drugs for headache and fever.    Avoid bright lights, which may bother your eyes.    Call the healthcare provider if symptoms worsen or there are signs of dehydration, such as a dry mouth, intense thirst, and little or no urination.  How is bacterial meningitis treated?  Urgent or emergency hospital care is needed for bacterial meningitis. In the hospital, fluids and antibiotics are given through an IV (intravenous) line. Medicine to reduce inflammation may  also be given. When symptoms are severe, a tube to aid breathing may be needed.  Vaccines for bacterial meningitis  There are several different vaccines for different types of bacterial meningitis.  The Haemophilus influenzae type b (Hib) vaccine prevents meningitis caused by a type of bacteria called Haemophilus influenzae type b. It is recommended for all children younger than 5 years old. It is usually given to infants starting at 2 months of age as a series.  Pneumococcal bacteria can also cause meningitis. The pneumococcal conjugate vaccine, PCV13, protects against the 13 types that cause the most severe pneumococcal infections. PCV13 is given to infants and toddlers, but may be given to older children as well. A dose is also recommended to older children who are at high risk. Another vaccine, PPSV23, is given to older children with certain chronic medical conditions.  Another type of meningitis is meningococcal meningitis. Vaccination is recommended beginning in children at age 11 through the age of 18. Catch-up vaccines may be given to those older than 18. College freshmen living in dormitories are one group at high risk. Vaccination is also recommended for those at high-risk beginning at age 2 months through 10 years. High-risk infants and children include those:    With specific medical conditions:  ? Complement component deficiencies (immune system condition with increased risk of serious infections)  ? Functional or anatomic asplenia (meaning that the spleen does not work effectively or has been removed), including those with sickle cell disease    Who live in an area where there is a meningococcal disease outbreak    Who travel to areas where meningococcal diseases is common or where there is an outbreak   To help prevent meningitis  Here are some tips to follow:     Wash your hands often with soap and water. If you can t wash your hands, use an alcohol-based hand gel containing at least 60% alcohol.       Don't share personal items, such as food, drinking glasses, eating utensils, or towels.    If you have had close contact with someone who has meningitis, ask your healthcare provider whether you should take antibiotics to prevent infection.  Twan last reviewed this educational content on 4/1/2018 2000-2020 [employer]. [address] [place] [address], [place] [address]. All rights reserved. This information is not intended as a substitute for professional medical care. Always follow your healthcare professional's instructions.

## 2021-01-23 ENCOUNTER — MYC MEDICAL ADVICE (OUTPATIENT)
Dept: FAMILY MEDICINE | Facility: CLINIC | Age: 66
End: 2021-01-23

## 2021-01-23 DIAGNOSIS — G03.9 MENINGITIS: ICD-10-CM

## 2021-01-23 LAB
ALBUMIN SERPL-MCNC: 2.9 G/DL (ref 3.4–5)
ALP SERPL-CCNC: 83 U/L (ref 40–150)
ALT SERPL W P-5'-P-CCNC: 46 U/L (ref 0–70)
ANION GAP SERPL CALCULATED.3IONS-SCNC: 5 MMOL/L (ref 3–14)
AST SERPL W P-5'-P-CCNC: 16 U/L (ref 0–45)
BILIRUB SERPL-MCNC: 0.4 MG/DL (ref 0.2–1.3)
BUN SERPL-MCNC: 17 MG/DL (ref 7–30)
CALCIUM SERPL-MCNC: 9.2 MG/DL (ref 8.5–10.1)
CHLORIDE SERPL-SCNC: 103 MMOL/L (ref 94–109)
CO2 SERPL-SCNC: 29 MMOL/L (ref 20–32)
CREAT SERPL-MCNC: 0.88 MG/DL (ref 0.66–1.25)
GFR SERPL CREATININE-BSD FRML MDRD: 90 ML/MIN/{1.73_M2}
GLUCOSE SERPL-MCNC: 92 MG/DL (ref 70–99)
POTASSIUM SERPL-SCNC: 5.1 MMOL/L (ref 3.4–5.3)
PROT SERPL-MCNC: 8.2 G/DL (ref 6.8–8.8)
SODIUM SERPL-SCNC: 137 MMOL/L (ref 133–144)
TSH SERPL DL<=0.005 MIU/L-ACNC: 1.33 MU/L (ref 0.4–4)

## 2021-01-23 NOTE — PROGRESS NOTES
[facility]          OUTPATIENT OCCUPATIONAL THERAPY  EVALUATION  PLAN OF TREATMENT FOR OUTPATIENT REHABILITATION  (COMPLETE FOR INITIAL CLAIMS ONLY)  Patient's Last Name, First Name, M.I.  YOB: 1955  Stevie Dotson                           Provider's Name  [facility] Medical Record No.  [id number]                               Onset Date:     01/07/21   Start of Care Date:     01/22/21   Type:     ___PT   _X_OT   ___SLP Medical Diagnosis:     Meningitis G03.9                           OT Diagnosis:     Decreased independence and safety in I/ADLs Visits from SOC:  1   _________________________________________________________________________________  Plan of Treatment/Functional Goals:  Manual therapy, Neuromuscular re-education, Self care/Home management, Strengthening, Therapeutic activities        Goals  Goal Identifier: LUE reaching  Goal Description: Stevie will demonstrate improved LUE strength at the Shoulder flexors and abductors as evidenced by a score of at least 4-/5 or greater in MMT and through ability to complete functional reaching tasks without pain to facilitate increased independence in meal preparation and home management tasks.   Target Date: 04/22/21     Goal Identifier: LUE ROM  Goal Description: Stevie will demonstrate improved L SH abduction by completing at least 130* ROM, without pain, to support functional reaching goals as needed to facilitate increased independence in meal preparation and home management tasks.   Target Date: 04/22/21     Goal Identifier: LUE Strengthening  Goal Description: Stevie will participate in LUE HEP as prescribed by OT for improved rotator cuff strength and scapular stability to support functional reaching goals as needed to facilitate increased independence in meal preparation and home management tasks.   Target Date:  04/22/21     Goal Identifier: FMC  Goal Description: Stevie will report at least 90% compliance with FMC tasks as prescribed by OT for improved bilateral FMC skills to facilitate increased independence and safety in meal preparation, home management tasks, and yardwork.   Target Date: 04/22/21     Goal Identifier: Memory  Goal Description: Stevie will demonstrate independent use of at least 3 memory strategies in clinic during meta-memory questionnaire, multi-task memory, etc. and report use of at least 3 external aids for improved memory in home setting, to facilitate increased independence and safety in medication management and management of schedules/appointments.   Target Date: 04/22/21     Goal Identifier: Visual Scanning/Reaction Time  Goal Description: Stevie will participate in I/ADL box, Dynavision (all modes), and Scancourse, demonstrating scores WNLs on all components for improved visual scanning/attention and reaction time skills to assist in increased independence and safety in community mobility tasks.   Target Date: 04/22/21          Therapy Frequency: 1x/week     Predicted Duration of Therapy Intervention (days/wks): 12 weeks  Heather Morgan OT          I CERTIFY THE NEED FOR THESE SERVICES FURNISHED UNDER        THIS PLAN OF TREATMENT AND WHILE UNDER MY CARE     (Physician co-signature of this document indicates review and certification of the therapy plan).                 ,    Certification date from: 01/22/21, Certification date to: 04/22/21               Referring Physician: Tavo Guillen MD     Initial Assessment        See Epic Evaluation      Start Of Care Date: 01/22/21

## 2021-01-23 NOTE — PROGRESS NOTES
01/22/21 1300   Quick Adds   Quick Adds Certification   Type of Visit Initial Outpatient Occupational Therapy Evaluation   General Information   Start Of Care Date 01/22/21   Referring Physician Tavo Guillen MD   Orders Evaluate and treat as indicated   Orders Date 01/14/21   Medical Diagnosis Meningitis G03.9    Onset of Illness/Injury or Date of Surgery 01/07/21   Precautions/Limitations Fall precautions  (L Hemiparesis; PT Eval on 1/29/21)   Surgical/Medical History Reviewed Yes   Additional Occupational Profile Info/Pertinent History of Current Problem Stevie is a 65 year old male who attends occupational therapy evaluation with his spouse following a hospital stay for Meningitis 1/7/2021 - 1/15/2021. Stevie participated in PT/OT/SLP in the ARU for 3 hours per day and reports significant improvement in cognitive function, but limited improvements in physical limitations. He does report that the OT at the ARU assisted in release techniques for L side of his neck and SH which has helped a lot to decrease his pain. He also reports significant low back pain. MRI results from 1/6/21 reveal minimal posterior disc bulges/herniations at the L1-L2, L2-L3 and L3-L4 levels. He has LUE weakness and reports anterior shoulder subluxation/pain (although unable to find dx in chart - X-ray results indicative of muscular cause vs impingement for pain) following hospital stay as well as L sided stiffness in his neck.    Role/Living Environment   Current Community Support Family/friend caregiver   Patient role/Employment history Retired   Community/Avocational Activities Enjoys yardwork, bike riding, motorcycle riding, reading   Current Living Environment House   Number of Stairs to Enter Home 1   Number of Stairs Within Home None   Primary Bathroom Location/Comments Main level    Primary Bathroom Set Up/Equipment Shower stall;Shower grab bar;Raised toilet seat   Additional Bathroom Set Up/Equipment   (Bath Bench)   Prior  Responsibilities - IADL Meal Preparation;Housekeeping;Laundry;Shopping;Driving;Yardwork;Medication management;Finances   Current Assistive Devices - Mobility Walker   Role/Living Environment Comments Patient lives in a single-level home with 1 step to enter the home. He lives with his wife and has two sons who live in [place], [place] and [place], [place]. 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 He reports that he and his wife typically share meal preparation, housekeeping, laundry, shopping and he does most of the driving, and yardwork. His wife has always managed their finances, but he is responsible for managing his mother's finances. At this time, his wife is doing most tasks for him and they have a neighbor boy who comes to help with snow removal. He reports most difficulty in functioning due to significant back pain. He was prescribed a muscle relaxant which doesn't appear to help very much. He feels he could handle more cognitively challenging tasks, but hasn't tried yet. His doctor has recommended that he not drive yet as well.    Pain   Patient currently in pain Yes   Pain location Lower back   Pain rating 7/10   Pain description Ache   Pain comments Reports that he also has shoulder pain, with movement only.   Fall Risk Screen   Fall screen completed by OT   Have you fallen 2 or more times in the past year? No   Have you fallen and had an injury in the past year? No   Is patient a fall risk? Yes   Fall screen comments Patient has left hemiparesis so is considered a falls risk at this time. PT eval scheduled for 1/29/21 to address balance and LE strength.    Abuse Screen (yes response referral indicated)   Feels Unsafe at Home or Work/School   (Not completed as spouse present for eval)   Cognitive Status Examination   Orientation Orientation to person, place and time   Level of Consciousness Alert   Follows Commands and Answers Questions Able to follow single-step instructions;Able to follow  multistep instructions;100% of the time   Personal Safety and Judgment Intact   Memory Impaired   Attention No deficits were identified   Organization/Problem Solving No deficits were identified   Executive Function No deficits were identified   Cognitive Comment Stevie participates in the MoCA, a screening assessment for cognitive skills. He did complete the MoCA version 8.1 in the hospital, so version 8.3 was used today. Patient scores 25/30 on the MoCA which is considered BNLs. He has most difficulty with language fluencey and delayed recall, with difficulty recalling 5 words provided cues. Patient recalls 2/5 words no cue, 1/5 provided category cue, 1/5 provided multiple choice, and 1/5 unable. Patient reports having some difficulty coming up with words in conversation and that he feels memory may impact his ability to manage schedules independently, etc.    Visual Perception   Visual Perception Wears glasses   Visual Perception Comments Patient wears reading glasses. He does note that there have been some bright flashes of light occassionally in periphery since being home, however, he hasn't noticed any functional deficits related to vision.    Sensation   Sensation Comments No concerns noted.    Range of Motion (ROM)   ROM Comments All movements in all planes WNLs bilaterally, except L SH Abduction to only 90*.    Strength   Strength Comments MMT: RUE grossly 5/5 mmg; L SH flex: 3+/5 mmg; L SH abduction: 2+/5 mmg. L distal extremity 5/5 mmg.    Hand Strength   Hand Dominance Right   Left Hand  (pounds) 82 pounds  (WNLs)   Right Hand  (pounds) 82 pounds  (WNLs)   Left Lateral Pinch (pounds) 22 pounds  (WNLs)   Right Lateral Pinch (pounds) 20 pounds  (WNLs)   Left Three Point Pinch (pounds) 19 pounds  (WNLs)   Right Three Point Pinch (pounds) 20 pounds  (WNLs)   Hand Strength Comments Patient participates in  and pinch strength testing, demonstrating all scores WNLs for age and gender. Patient does  not report any concern for L hand or finger weakness.    Coordination   Left Hand, Nine Hole Peg Test (seconds) 32  (-2SD)   Right Hand, Nine Hole Peg Test (seconds) 28  (-2SD)   Coordination Comments Patient participates in NHPT with scores greater than 2 SD below the mean for his age and gender, bilaterallly, indicating FMC deficits on the R and L side. Patient does not report significant concern in this area as he has been able to complete fasteners since being home and has not noted increased difficulty with handwriting.    Bathing   Level of [place] - Bathing minimum assist (75% patients effort)   Bathing Comments SBA for safety; helps with washing Legs and drying legs   Upper Body Dressing   Level of [place]: Dress Upper Body independent   Lower Body Dressing   Level of [place]: Dress Lower Body minimum assist (75% patients effort)   Lower Body Dressing Comments socks   Toileting   Level of [place]: Toilet independent   Grooming   Level of [place]: Grooming minimum assist (75% patients effort)   Grooming Comments Combing hair due to decreased sensation at back of head   Eating/Self-Feeding   Level of [place]: Eating independent   Activity Tolerance   Activity Tolerance Decreased.    Instrumental Activities of Daily Living Assessment   IADL Quick Adds Meal Planning/Preparation;Home/Financial/Management;Communication/Computer Use;Community Mobility;Care of Others   Meal Planning/Preparation Patients wife has been doing all meal planning/preparation since being home. Patient is unsure how this type of task would go.    Home/Financial Management Patient feels it would be challenging to complete home management tasks at this time due to back pain and L side weakness. He does feel he could manage his mother's finances if he needed to.    Communication/Computer Use Patient has no concerns in this area at this time.    Community Mobility Patient is dependent in this area at this time. He  would like to drive independently again.    Care of Others N/A   Planned Therapy Interventions   Planned Therapy Interventions Manual therapy;Neuromuscular re-education;Self care/Home management;Strengthening;Therapeutic activities   Adult OT Eval Goals   OT Eval Goals (Adult) 1;2;3;4;5;6    OT Goal 1   Goal Identifier LUE reaching   Goal Description Stevie will demonstrate improved LUE strength at the Shoulder flexors and abductors as evidenced by a score of at least 4-/5 or greater in MMT and through ability to complete functional reaching tasks without pain to facilitate increased independence in meal preparation and home management tasks.    Target Date 04/22/21    OT Goal 2   Goal Identifier LUE ROM   Goal Description Stevie will demonstrate improved L SH abduction by completing at least 130* ROM, without pain, to support functional reaching goals as needed to facilitate increased independence in meal preparation and home management tasks.    Target Date 04/22/21    OT Goal 3   Goal Identifier LUE Strengthening   Goal Description Stevie will participate in LUE HEP as prescribed by OT for improved rotator cuff strength and scapular stability to support functional reaching goals as needed to facilitate increased independence in meal preparation and home management tasks.    Target Date 04/22/21   OT Goal 4   Goal Identifier FMC   Goal Description Stevie will report at least 90% compliance with FMC tasks as prescribed by OT for improved bilateral FMC skills to facilitate increased independence and safety in meal preparation, home management tasks, and yardwork.    Target Date 04/22/21   OT Goal 5   Goal Identifier Memory   Goal Description Stevie will demonstrate independent use of at least 3 memory strategies in clinic during meta-memory questionnaire, multi-task memory, etc. and report use of at least 3 external aids for improved memory in home setting, to facilitate increased independence and safety in  medication management and management of schedules/appointments.    Target Date 04/22/21    OT Goal 6   Goal Identifier Visual Scanning/Reaction Time   Goal Description Stevie will participate in I/ADL box, Dynavision (all modes), and Scancourse, demonstrating scores WNLs on all components for improved visual scanning/attention and reaction time skills to assist in increased independence and safety in community mobility tasks.    Target Date 04/22/21   Clinical Impression   Criteria for Skilled Therapeutic Interventions Met Yes, treatment indicated   OT Diagnosis Decreased independence and safety in I/ADLs   Influenced by the following impairments Left Hemiparesis, FMC deficits, cognitive changes, back pain   Assessment of Occupational Performance 5 or more Performance Deficits   Identified Performance Deficits Meal preparation, home management, community mobility, medication management, managing schedules, yardwork   Clinical Decision Making (Complexity) Low complexity   Therapy Frequency 1x/week   Predicted Duration of Therapy Intervention (days/wks) 12 weeks   Risks and Benefits of Treatment have been explained. Yes   Patient, Family & other staff in agreement with plan of care Yes   Clinical Impression Comments Stevie is a 65 year old male who presents to occupational therapy evaluation following a hospital stay for Meningitis with residual deficits of LUE weakness, FMC deficits and cognitive changes impacting safety and independence in I/ADLs. Recommend skilled occupational therapy services to assist in improved strength, coordination, and memory for increased independence and safety in meal preparation, home management tasks, community mobility, medication management, and yardwork.    Education Assessment   Barriers To Learning Physical   Preferred Learning Style Demonstration;Pictures/video;Reading   Therapy Certification   Certification date from 01/22/21   Certification date to 04/22/21   Certification I  certify the need for these services furnished under this plan of treatment and while under my care.  (Physician co-signature of this document indicates review and certification of the therapy plan)   Total Evaluation Time   OT [place], Low Complexity Minutes ([id number]) 55       Heather Morgan OTR/L  Occupational Therapist     [facility] [employer]  [address], [place]  [email or web address]   Cell or pager: [phone]  Employed by [employer]

## 2021-01-25 RX ORDER — LEVETIRACETAM 1000 MG/1
1000 TABLET ORAL 2 TIMES DAILY
Qty: 60 TABLET | Refills: 0 | Status: SHIPPED | OUTPATIENT
Start: 2021-01-25 | End: 2021-02-25

## 2021-01-25 NOTE — TELEPHONE ENCOUNTER
Routing refill request to provider for review/approval because:  Not prescribed by provider.

## 2021-01-26 LAB — VIT B1 BLD-MCNC: 90 NMOL/L (ref 70–180)

## 2021-01-27 ENCOUNTER — HOSPITAL ENCOUNTER (OUTPATIENT)
Dept: OCCUPATIONAL THERAPY | Facility: CLINIC | Age: 66
Setting detail: THERAPIES SERIES
End: 2021-01-27
Attending: STUDENT IN AN ORGANIZED HEALTH CARE EDUCATION/TRAINING PROGRAM
Payer: COMMERCIAL

## 2021-01-27 PROCEDURE — 97140 MANUAL THERAPY 1/> REGIONS: CPT | Mod: GO | Performed by: OCCUPATIONAL THERAPIST

## 2021-01-27 PROCEDURE — 97110 THERAPEUTIC EXERCISES: CPT | Mod: GO | Performed by: OCCUPATIONAL THERAPIST

## 2021-01-27 PROCEDURE — 97535 SELF CARE MNGMENT TRAINING: CPT | Mod: GO | Performed by: OCCUPATIONAL THERAPIST

## 2021-01-29 ENCOUNTER — HOSPITAL ENCOUNTER (OUTPATIENT)
Dept: PHYSICAL THERAPY | Facility: CLINIC | Age: 66
Setting detail: THERAPIES SERIES
End: 2021-01-29
Attending: STUDENT IN AN ORGANIZED HEALTH CARE EDUCATION/TRAINING PROGRAM
Payer: COMMERCIAL

## 2021-01-29 PROCEDURE — 97110 THERAPEUTIC EXERCISES: CPT | Mod: GP | Performed by: PHYSICAL THERAPIST

## 2021-01-29 PROCEDURE — 97161 PT EVAL LOW COMPLEX 20 MIN: CPT | Mod: GP | Performed by: PHYSICAL THERAPIST

## 2021-02-02 ENCOUNTER — HOSPITAL ENCOUNTER (OUTPATIENT)
Dept: PHYSICAL THERAPY | Facility: CLINIC | Age: 66
Setting detail: THERAPIES SERIES
End: 2021-02-02
Attending: STUDENT IN AN ORGANIZED HEALTH CARE EDUCATION/TRAINING PROGRAM
Payer: COMMERCIAL

## 2021-02-02 PROCEDURE — 97110 THERAPEUTIC EXERCISES: CPT | Mod: GP | Performed by: PHYSICAL THERAPIST

## 2021-02-03 ENCOUNTER — HOSPITAL ENCOUNTER (OUTPATIENT)
Dept: OCCUPATIONAL THERAPY | Facility: CLINIC | Age: 66
Setting detail: THERAPIES SERIES
End: 2021-02-03
Attending: STUDENT IN AN ORGANIZED HEALTH CARE EDUCATION/TRAINING PROGRAM
Payer: COMMERCIAL

## 2021-02-03 PROCEDURE — 97535 SELF CARE MNGMENT TRAINING: CPT | Mod: GO | Performed by: OCCUPATIONAL THERAPIST

## 2021-02-08 ENCOUNTER — HOSPITAL ENCOUNTER (OUTPATIENT)
Dept: OCCUPATIONAL THERAPY | Facility: CLINIC | Age: 66
Setting detail: THERAPIES SERIES
End: 2021-02-08
Attending: STUDENT IN AN ORGANIZED HEALTH CARE EDUCATION/TRAINING PROGRAM
Payer: COMMERCIAL

## 2021-02-08 PROCEDURE — 97110 THERAPEUTIC EXERCISES: CPT | Mod: GO | Performed by: OCCUPATIONAL THERAPIST

## 2021-02-08 PROCEDURE — 97535 SELF CARE MNGMENT TRAINING: CPT | Mod: GO | Performed by: OCCUPATIONAL THERAPIST

## 2021-02-08 PROCEDURE — 97140 MANUAL THERAPY 1/> REGIONS: CPT | Mod: GO | Performed by: OCCUPATIONAL THERAPIST

## 2021-02-09 ENCOUNTER — HOSPITAL ENCOUNTER (OUTPATIENT)
Dept: PHYSICAL THERAPY | Facility: CLINIC | Age: 66
Setting detail: THERAPIES SERIES
End: 2021-02-09
Payer: COMMERCIAL

## 2021-02-09 PROCEDURE — 97110 THERAPEUTIC EXERCISES: CPT | Mod: GP | Performed by: PHYSICAL THERAPIST

## 2021-02-09 NOTE — PROGRESS NOTES
"   01/29/21 1500   Quick Adds   Quick Adds Certification;Vestibular Eval   Type of Visit Initial OP PT Evaluation   General Information   Start of Care Date 01/29/21   Referring Physician Tavo Guillen MD    Orders Evaluate and Treat as Indicated   Additional Orders OT (completed evaluation: 1/22/21)   Order Date 01/14/21   Medical Diagnosis Meningitis G03.9  - Primary    Onset of illness/injury or Date of Surgery 01/07/21   Surgical/Medical history reviewed Yes   Pertinent history of current problem (include personal factors and/or comorbidities that impact the POC) Stevie is a 65 year old male who arrives for  PT evaluation with his spouse following a hospital stay for Meningitis 1/7/2021 - 1/15/2021. Stevie participated in PT/OT/SLP in the ARU for 3 hours per day and reports significant improvement in cognitive function, but limited improvements in physical limitations. NECK/BACK PAIN: he reports significant low back pain. MRI results from 1/6/21 reveal minimal posterior disc bulges/herniations at the L1-L2, L2-L3 and L3-L4 levels./  L sided stiffness in his neck/points to upper trap. Reports his L shoulder and chest feel \"tight and sore\" SENSATION: reports numbness back of skull since hospitalization. 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 WEAKNESS: On his L side, UE/LE. VISION: reports when looking quickly to his L he has moments of doubled vision. No dizziness.   Prior level of function comment BALANCE: Every now and then feels a little unsteady for a moment turning a corner. No falls/near falls. AMBULATION: Walking length of house with walker, sometimes carries it. Doesnt use AD if walking a few feet away. No stairs in home, 1 step to enter. EXERCISE: 2x/day has done UE/LE exs provided from therapy. 30 min of exs - feeling stronger. DAILY ACTIVITIES: He reports that he and his wife typically share meal preparation, housekeeping, " laundry, shopping and he does most of the driving, and yardwork. At this time, his wife is doing most tasks for him and they have a neighbor boy who comes to help with snow removal. He has not been participating in home tasks because of his back pain bending over. Needs to hold onto something. Overall feels like pain is improving. Sites in recliner most of the day except for participating in exs 2x/day and at table for meals. TRANSPORTATION: Hasn't returned to driving yet. Previously motorcycle riding. //PLOF: Before dx with meningitis he was very active: yardwork and handiman tasks for his kids bike riding 30 miles in the summer.   Diagnostic Tests   (MRI)   Previous/Current Treatment Physical Therapy;Occupational Therapy;Other  (SLP)   Current Community Support Family/friend caregiver  (spouse)   Patient role/Employment history Retired  (trunk )   Living environment House/[facility]  (1 level home)   Current Assistive Devices Front Wheeled Walker   ADL Devices   (Shower stall;Shower grab bar;Raised toilet seat)   Patient/Family Goals Statement return to walking without AD, driving, participating in daily tasks around house without back pain   Fall Risk Screen   Fall screen completed by PT   Have you fallen 2 or more times in the past year? No   Have you fallen and had an injury in the past year? No   Timed Up and Go score (seconds) n/t (see TOMAS and FGA)   Is patient a fall risk? No   Abuse Screen (yes response referral indicated)   Feels Unsafe at Home or Work/School   ((Not completed as spouse present for eval))   System Outcome Measures   Outcome Measures   (Oswestry 48%)   Pain   Patient currently in pain Yes   Pain comments LBP: PLOF: was not having back pain prior. Currently: 1-2/10, worst: up to 6-7/10 sitting or laying flat, firm surfaces, sitting in truck for a couple of hrs. Relief: sit in recliner with heating pad - takes a couple of hours before subsides some days are good - today is a good day.  Used to be 6-7/10 mostly.   Feels swollen in back. Has taked to MDs about it, given ms relaxor.  Doenst do well with firm surfaces - sleeps on side on couch with support against couch, unable to sleep in bed.   Vitals Signs   Vital Signs Comments TBA   Cognitive Status Examination   Orientation orientation to person, place and time   Level of Consciousness alert   Follows Commands and Answers Questions 100% of the time   Personal Safety and Judgment intact   Memory   (See OT evaluation)   Observation   Observation inc LBP during assessment, limited laying, standing, walking tolerance   Strength   Strength Comments 5/5 bilat LEs hip flex, abd, knee flex/ext, ankle DF   Bed Mobility   Bed Mobility Comments independent   Transfer Skills   Transfer Comments 30 sec sit <> stand: 11.5 reps without UE support, inc RR, no inc LBP (16 reps age/gender norms)   Gait   Gait Comments over short distances indoors without AD: reciprocal gait, stiffness through trunk reporting LBP with minimal armswing bilat, L side trunk lean reports back pain L >R   Gait Special Tests   Gait Special Tests 25 FOOT TIMED WALK;FUNCTIONAL GAIT ASSESSMENT   Gait Special Tests 25 Foot Timed Walk   Comments .87 m/s   Gait Special Tests Functional Gait Assessment Score out of 30   Score out of 30 26   Balance Special Tests   Balance Special Tests Single leg stance right;Single leg stance left;Gonzales balance   Balance Special Tests Gonzales Balance   Score out of 56 54   Balance Special Tests Single Leg Stance Right,   Comments 2-4 sec over 3 trials   Balance Special Tests Single Leg Stance Left   Comments 2-3 sec over 3 trials   Muscle Tone   Muscle Tone no deficits were identified   Oculomotor Exam   Smooth Pursuit Normal   VOR Normal   VOR Comments denies doubled vision, keeps target in focus at 240 bpm   Convergence Testing Normal   Modality Interventions   Planned Modality Interventions Comments per therapist discretion   Planned Therapy Interventions    Planned Therapy Interventions balance training;gait training;neuromuscular re-education;strengthening;stretching;manual therapy   Clinical Impression   Criteria for Skilled Therapeutic Interventions Met yes, treatment indicated   PT Diagnosis impaired mobility and participation in daily activities most significantly influenced by LBP   Influenced by the following impairments low back pain, neck stiffness, PMH of numbness bottom of feet, reports doubled vision looking quickly L although VOR intact per today's assessment, impiared postural stability with SLS, functional LE strength with repeated sit <> stand   Functional limitations due to impairments reports feeling unsteady occasionally, dec gait speed,  participation in IADLs limited d/t LBP, sleep, participation in recreational activites like biking/motercycle riding   Clinical Presentation Stable/Uncomplicated   Clinical Decision Making (Complexity) Low complexity   Therapy Frequency 1 time/week   Predicted Duration of Therapy Intervention (days/wks) 6 wks   Risk & Benefits of therapy have been explained Yes   Patient, Family & other staff in agreement with plan of care Yes   Education Assessment   Barriers to Learning No barriers   GOALS   PT Eval Goals 1;2;3;4;5;6   Goal 1   Goal Identifier Oswestry/Back Pain (baseline: 48% (severe disability), 10% = MDC, </= 20% = min disability); PLOF: no back pain)   Goal Description Pt will score </= 20 % on the Oswestry to indicate less perceived level of disability and improved QOL   Target Date 03/17/21   Goal 2   Goal Identifier Sleep/IADLs (baseline: pt is sleeping on couch vs bed and not participating in IADLs d/t LBP, sits in recliner most of the day)   Goal Description Pt to report return to sleeping in his bed without limitation d/t LBP and return to participation in >/= 80% of previous IADLs around home without LBP.   Target Date 03/17/21   Goal 3   Goal Identifier Exercise (baseline: 30 min of exs from OT/PT  daily, PLOF: in the summer biking 30 miles)   Goal Description Pt to demonstrate (I) with HEP for stretching, postural and gait stability, and endurance exercises for progress towards all goals and continued self maintenance following d/c from therapy    Target Date 03/17/21   Goal 4   Goal Identifier Gait/Gait speed (baseline: stiffness through trunk, minimal armswing bilat, L side trunk lean reports back pain L >R, .87 m/s (household/limited community amb; 1.3 m/s = speed to cross street safely)   Goal Description Pt to demonstrate improved gait with </= minimal LBP demonstrating appropraite trunk rotation and armswing, improved gait speed for safety with community ambulation to >/= 1.3 m/s   Target Date 03/17/21   Goal 5   Goal Identifier SLS (baseline: L LE 2-3, R 2-4 sec - inc LBP, < 5 sec inc fall risk)   Goal Description Pt to sustain SLS >/= 5 sec towards dec fall risk and report improved tolerance without LBP   Target Date 03/17/21   Goal 6   Goal Identifier 30 sec sit <> stand (baseline: 11.5 reps without UE support, inc RR, no inc LBP (16 reps age/gender norms)   Goal Description Pt to complete 16 reps towards improved functional LE strength with transfers considered WNL for age/gender.   Target Date 03/17/21   Total Evaluation Time   PT Eval, Low Complexity Minutes (04732) 45   Therapy Certification   Certification date from 01/29/21   Certification date to 03/17/21   Medical Diagnosis Meningitis G03.9  - Primary

## 2021-02-09 NOTE — PROGRESS NOTES
[facility]        OUTPATIENT PHYSICAL THERAPY FUNCTIONAL EVALUATION  PLAN OF TREATMENT FOR OUTPATIENT REHABILITATION  (COMPLETE FOR INITIAL CLAIMS ONLY)  Patient's Last Name, First Name, M.I.  YOB: 1955  Stevie Dotson        Provider's Name   [facility]   Medical Record No.  [id number]     Start of Care Date:  01/29/21   Onset Date:  01/07/21   Type:     _X__PT   ____OT  ____SLP Medical Diagnosis:  Meningitis G03.9  - Primary      PT Diagnosis:  impaired mobility and participation in daily activities most significantly influenced by LBP Visits from SOC:  1                              __________________________________________________________________________________  Plan of Treatment/Functional Goals:  balance training, gait training, neuromuscular re-education, strengthening, stretching, manual therapy           GOALS  Oswestry/Back Pain (baseline: 48% (severe disability), 10% = MDC, </= 20% = min disability); PLOF: no back pain)  Pt will score </= 20 % on the Oswestry to indicate less perceived level of disability and improved QOL  03/17/21    Sleep/IADLs (baseline: pt is sleeping on couch vs bed and not participating in IADLs d/t LBP, sits in recliner most of the day)  Pt to report return to sleeping in his bed without limitation d/t LBP and return to participation in >/= 80% of previous IADLs around home without LBP.  03/17/21    Exercise (baseline: 30 min of exs from OT/PT daily, PLOF: in the summer biking 30 miles)  Pt to demonstrate (I) with HEP for stretching, postural and gait stability, and endurance exercises for progress towards all goals and continued self maintenance following d/c from therapy   03/17/21    Gait/Gait speed (baseline: stiffness through trunk, minimal armswing bilat, L side trunk lean reports back pain L >R, .87 m/s  (household/limited community amb; 1.3 m/s = speed to cross street safely)  Pt to demonstrate improved gait with </= minimal LBP demonstrating appropraite trunk rotation and armswing, improved gait speed for safety with community ambulation to >/= 1.3 m/s  03/17/21    SLS (baseline: L LE 2-3, R 2-4 sec - inc LBP, < 5 sec inc fall risk)  Pt to sustain SLS >/= 5 sec towards dec fall risk and report improved tolerance without LBP  03/17/21    30 sec sit <> stand (baseline: 11.5 reps without UE support, inc RR, no inc LBP (16 reps age/gender norms)  Pt to complete 16 reps towards improved functional LE strength with transfers considered WNL for age/gender.  03/17/21                          Therapy Frequency:  1 time/week   Predicted Duration of Therapy Intervention:  6 wks    Carline Parekh, PT                                    I CERTIFY THE NEED FOR THESE SERVICES FURNISHED UNDER        THIS PLAN OF TREATMENT AND WHILE UNDER MY CARE     (Physician co-signature of this document indicates review and certification of the therapy plan).                Certification Date From:  01/29/21   Certification Date To:  03/17/21    Referring Provider:  Tavo Guillen MD     Initial Assessment  See Epic Evaluation- Start of Care Date: 01/29/21

## 2021-02-10 LAB
BACTERIA SPEC CULT: ABNORMAL
BACTERIA SPEC CULT: ABNORMAL
SPECIMEN SOURCE: ABNORMAL

## 2021-02-15 ENCOUNTER — HOSPITAL ENCOUNTER (OUTPATIENT)
Dept: PHYSICAL THERAPY | Facility: CLINIC | Age: 66
Setting detail: THERAPIES SERIES
End: 2021-02-15
Attending: STUDENT IN AN ORGANIZED HEALTH CARE EDUCATION/TRAINING PROGRAM
Payer: COMMERCIAL

## 2021-02-15 PROCEDURE — 97112 NEUROMUSCULAR REEDUCATION: CPT | Mod: GP | Performed by: PHYSICAL THERAPIST

## 2021-02-15 PROCEDURE — 97110 THERAPEUTIC EXERCISES: CPT | Mod: GP | Performed by: PHYSICAL THERAPIST

## 2021-02-18 ENCOUNTER — HOSPITAL ENCOUNTER (OUTPATIENT)
Dept: OCCUPATIONAL THERAPY | Facility: CLINIC | Age: 66
Setting detail: THERAPIES SERIES
End: 2021-02-18
Attending: STUDENT IN AN ORGANIZED HEALTH CARE EDUCATION/TRAINING PROGRAM
Payer: COMMERCIAL

## 2021-02-18 PROCEDURE — 96125 COGNITIVE TEST BY HC PRO: CPT | Mod: GO | Performed by: OCCUPATIONAL THERAPIST

## 2021-02-18 PROCEDURE — 97535 SELF CARE MNGMENT TRAINING: CPT | Mod: GO | Performed by: OCCUPATIONAL THERAPIST

## 2021-02-18 NOTE — PROGRESS NOTES
Cognitive Assessment of [place]    SUMMARY OF TEST:  The Cognitive Assessment of [place] (CAM) is a standardized measure used to assess cognitive abilities and deficits.  The CAM is formatted to assess cognitive skills in the areas of attention, orientation, memory, following directions, temporal awareness, discrimination, sequencing, calculation, planning, verbal safety/judgment, problem solving and abstract reasoning.    DATE OF TESTIN2021    RESULTS OF TESTING:    The patient scores with no impairment in Attention, Immediate auditory memory, Immediate motor memory, Temporal awareness, Object identification, Motor recall/recognition, Money skills mental manipulation, Simple problem solving, Moderate problem solving and Complex problem solving    The patient scores with mild impairment in N/A    The patient scores with moderate impairment in Visual memory/sequencing backward, Auditory memory/sequencing forward, Auditory memory/sequencing backward, Multiple digit math skills and Mental flexibility    The patient scores with severe impairment in Visual memory/sequencing forward and Auditory recall/recognition    Test results comments:  Patient does not understand how these skill areas impact him functionally - he feels if he were to complete functional tasks to evaluate these skills he would score much better.     INTERPRETATION OF TEST RESULTS:      Stevie participates in the CAM on 2021 with scores as listed above, demonstrating greatest deficits in short-term memory, visual and auditory sequencing forward/backward, and attention to detail (poor attention to detail led to errors in multi-digit math). Patient demonstrates moderate to severe impairments in these skill areas; however, patient does not feel these deficits are impacting him functionally. His spouse does report that she has noticed there may have been some decline in these areas since his illness.     Factors affecting performance:     No additional problems noted    Recommendations: Recommend skilled occupational therapy services to assist in improved memory and external aids for increased independence and safety in managing schedules/appointments, managing medications, and financial management.       TIME ADMINISTERING TEST: 35 minutes    TIME FOR INTERPRETATION AND PREPARATION OF REPORT: 25 minutes    TOTAL TIME: 60 minutes    Heather Morgan OTR/L  Occupational Therapist     [facility] [employer]  [address]  [place], [place]  [email or web address]   Cell or pager: [phone]  Employed by [facility] [facility]

## 2021-02-22 ENCOUNTER — HOSPITAL ENCOUNTER (OUTPATIENT)
Dept: PHYSICAL THERAPY | Facility: CLINIC | Age: 66
Setting detail: THERAPIES SERIES
End: 2021-02-22
Attending: STUDENT IN AN ORGANIZED HEALTH CARE EDUCATION/TRAINING PROGRAM
Payer: COMMERCIAL

## 2021-02-22 PROCEDURE — 97110 THERAPEUTIC EXERCISES: CPT | Mod: GP | Performed by: PHYSICAL THERAPIST

## 2021-02-22 PROCEDURE — 97140 MANUAL THERAPY 1/> REGIONS: CPT | Mod: GP | Performed by: PHYSICAL THERAPIST

## 2021-02-22 PROCEDURE — 97112 NEUROMUSCULAR REEDUCATION: CPT | Mod: GP | Performed by: PHYSICAL THERAPIST

## 2021-02-23 ENCOUNTER — OFFICE VISIT (OUTPATIENT)
Dept: FAMILY MEDICINE | Facility: CLINIC | Age: 66
End: 2021-02-23
Payer: COMMERCIAL

## 2021-02-23 ENCOUNTER — HOSPITAL ENCOUNTER (OUTPATIENT)
Dept: OCCUPATIONAL THERAPY | Facility: CLINIC | Age: 66
Setting detail: THERAPIES SERIES
End: 2021-02-23
Attending: STUDENT IN AN ORGANIZED HEALTH CARE EDUCATION/TRAINING PROGRAM
Payer: COMMERCIAL

## 2021-02-23 VITALS
OXYGEN SATURATION: 96 % | TEMPERATURE: 98.1 F | RESPIRATION RATE: 12 BRPM | BODY MASS INDEX: 30.88 KG/M2 | DIASTOLIC BLOOD PRESSURE: 70 MMHG | HEIGHT: 73 IN | HEART RATE: 56 BPM | WEIGHT: 233 LBS | SYSTOLIC BLOOD PRESSURE: 112 MMHG

## 2021-02-23 DIAGNOSIS — G03.9 MENINGITIS: Primary | ICD-10-CM

## 2021-02-23 DIAGNOSIS — M54.50 CHRONIC LEFT-SIDED LOW BACK PAIN WITHOUT SCIATICA: ICD-10-CM

## 2021-02-23 DIAGNOSIS — Z23 NEED FOR SHINGLES VACCINE: ICD-10-CM

## 2021-02-23 DIAGNOSIS — G89.29 CHRONIC LEFT-SIDED LOW BACK PAIN WITHOUT SCIATICA: ICD-10-CM

## 2021-02-23 DIAGNOSIS — N52.9 ERECTILE DYSFUNCTION, UNSPECIFIED ERECTILE DYSFUNCTION TYPE: ICD-10-CM

## 2021-02-23 DIAGNOSIS — Z00.00 ENCOUNTER FOR MEDICARE ANNUAL WELLNESS EXAM: ICD-10-CM

## 2021-02-23 DIAGNOSIS — Z87.891 FORMER SMOKER: ICD-10-CM

## 2021-02-23 PROCEDURE — 97535 SELF CARE MNGMENT TRAINING: CPT | Mod: GO | Performed by: OCCUPATIONAL THERAPIST

## 2021-02-23 PROCEDURE — 99213 OFFICE O/P EST LOW 20 MIN: CPT | Mod: 25 | Performed by: FAMILY MEDICINE

## 2021-02-23 PROCEDURE — 99397 PER PM REEVAL EST PAT 65+ YR: CPT | Performed by: FAMILY MEDICINE

## 2021-02-23 RX ORDER — ZOSTER VACCINE RECOMBINANT, ADJUVANTED 50 MCG/0.5
1 KIT INTRAMUSCULAR ONCE
Qty: 0.5 ML | Refills: 0 | Status: SHIPPED | OUTPATIENT
Start: 2021-02-23 | End: 2021-02-23

## 2021-02-23 RX ORDER — SILDENAFIL CITRATE 20 MG/1
TABLET ORAL
Qty: 15 TABLET | Refills: 5 | Status: SHIPPED | OUTPATIENT
Start: 2021-02-23 | End: 2022-04-21 | Stop reason: DRUGHIGH

## 2021-02-23 RX ORDER — INFLUENZA A VIRUS A/HAWAII/70/2019 (H1N1) RECOMBINANT HEMAGGLUTININ ANTIGEN, INFLUENZA A VIRUS A/MINNESOTA/41/2019 (H3N2) RECOMBINANT HEMAGGLUTININ ANTIGEN, INFLUENZA B VIRUS B/WASHINGTON/02/2019 RECOMBINANT HEMAGGLUTININ ANTIGEN, AND INFLUENZA B VIRUS B/PHUKET/3073/2013 RECOMBINANT HEMAGGLUTININ ANTIGEN 45; 45; 45; 45 UG/.5ML; UG/.5ML; UG/.5ML; UG/.5ML
INJECTION INTRAMUSCULAR
COMMUNITY
Start: 2020-10-14 | End: 2022-04-21

## 2021-02-23 ASSESSMENT — ENCOUNTER SYMPTOMS
FEVER: 0
JOINT SWELLING: 0
WEAKNESS: 0
FREQUENCY: 0
ABDOMINAL PAIN: 0
PALPITATIONS: 0
ARTHRALGIAS: 0
NERVOUS/ANXIOUS: 0
CONSTIPATION: 0
DIARRHEA: 0
MYALGIAS: 0
HEMATURIA: 0
HEADACHES: 0
EYE PAIN: 0
DYSURIA: 0
COUGH: 0
NAUSEA: 0
PARESTHESIAS: 0
SORE THROAT: 0
SHORTNESS OF BREATH: 0
HEARTBURN: 0
CHILLS: 0
DIZZINESS: 0
HEMATOCHEZIA: 0
BACK PAIN: 1

## 2021-02-23 ASSESSMENT — MIFFLIN-ST. JEOR: SCORE: 1891.79

## 2021-02-23 NOTE — PATIENT INSTRUCTIONS
Patient Education   Personalized Prevention Plan  You are due for the preventive services outlined below.  Your care team is available to assist you in scheduling these services.  If you have already completed any of these items, please share that information with your care team to update in your medical record.  Health Maintenance Due   Topic Date Due     Discuss Advance Care Planning  1955     Zoster (Shingles) Vaccine (1 of 2) 09/08/2005     Annual Wellness Visit  09/08/2020     AORTIC ANEURYSM SCREENING (SYSTEM ASSIGNED)  09/08/2020

## 2021-02-23 NOTE — PROGRESS NOTES
"    {PROVIDER CHARTING PREFERENCE:279454}    Jah Hubbard is a 65 year old who presents for the following health issues {ACCOMPANIED BY STATEMENT (Optional):953145}    History of Present Illness       He eats 2-3 servings of fruits and vegetables daily.He consumes 1 sweetened beverage(s) daily.He exercises with enough effort to increase his heart rate 30 to 60 minutes per day.  He exercises with enough effort to increase his heart rate 7 days per week.   He is taking medications regularly.           Hospital Follow-up Visit:    Hospital/Nursing Home/ Rehab Facility[facility]  Date of Admission: 01/07/2021  Date of Discharge: 01/15/2021  Reason(s) for Admission: Meningitis      Was your hospitalization related to COVID-19? No   Problems taking medications regularly:  None  Medication changes since discharge: None  Problems adhering to non-medication therapy:   no    Summary of hospitalization:  {HOSPITAL DISCHARGE SUMMARY INFO:465555::\"[facility] hospital discharge summary reviewed\"}  Diagnostic Tests/Treatments reviewed.  Follow up needed: {NONE DEFAULTED:017485::\"none\"}  Other Healthcare Providers Involved in Patient s Care:         {those currently involved after discharge:644811::\"None\"}  Update since discharge: {IMPROVED DEFAULT:853718::\"improved.\"} {TIP  Include information from family/caregivers, SNF, Care Coordination :974729}      Post Discharge Medication Reconciliation: {ACO Med Rec (Provider):171352}.  Plan of care communicated with {Communicate Plan to:607524::\"patient\"}     {Reference  Coding guidelines- Moderate Complexity F2F/Video within 7 - 14 days of discharge 55190, High Complexity F2F/Video within 7 days 51838 or [id number] days 85403 :065163}         {additonal problems for provider to add (Optional):462947}    Review of Systems   {ROS COMP (Optional):695582}      Objective    /70 (Cuff Size: Adult Large)   Pulse 56   Temp 98.1  F (36.7  C) (Oral)   Resp " "12   Ht 1.848 m (6' 0.75\")   Wt 105.7 kg (233 lb)   SpO2 96%   BMI 30.95 kg/m    Body mass index is 30.95 kg/m .  Physical Exam   {Exam List (Optional):558014}    {Diagnostic Test Results (Optional):597176}    {AMBULATORY ATTESTATION (Optional):538281}        "

## 2021-02-23 NOTE — PROGRESS NOTES
Cognitive Performance Test    SUMMARY OF TEST:    The Cognitive Performance Test (CPT) is a standardized performance-based assessment to measure working memory/executive function processing capacities that underlie functional performance. Subtasks include common basic and instrumental activities of daily living (ADL/IADL) which are rated based on the manner in which patients respond to task demands of varying complexity. The total CPT score describes a level of functioning that indicates how inf  ormation is processed, implications for functional activities, potential safety risks and a recommended level of supervision or assist based on cognitive function. The highest total score on this test is in the range of 5.6 to 5.8.    DATE OF TESTIN2021    RESULTS OF TESTING:                                                                                           CPT Subtest Results    MEDBOX: 5.0  /6 SHOP/GLOVES:  5.0  /6 PHONE:    WASH:   TRAVEL:  TOAST: 4.5  /5   DRESS:     TOTAL CPT SCORE:  25.5  / 28.0       Average CPT Score  5.1  / 5.6     INTERPRETATION OF TEST RESULTS:    Based on the Cognitive Performance Test, this patient scored at CPT Level 5.1.  See CPT Levels reference below.    Summary of functional cognitive status:   Pt's cognitive testing last session was completed by another OT and he had difficulty fully applying himself, meets with feedback feeling criticized. As a student, was very sensitive to criticism and test taking scenarios.     Pt has been doing well with his home tasks, makes his own breakfast using stove safely, managing his own medications an his mother's finances/bills.  Wife has not seen any cognitive errors lately, but she does refill his medication since it is at her pharmacy now.  He describes the systems he uses/in place for managing household details, changing air filters and starting motors every 2 weeks with calendar logging going on.  He is able to  "demonstrate verbal pathfinding and points out safety scenarios with his wife when in the car.  He has good insight in how he has been feeling ready to drive for the past 2 weeks, \" and I bisi could not drive on the way home from the hospital, that is for sure.\"      During CPT task performance, required repeat to recall goals of tasks ( OTR repeats request to see pt demonstrate performance of task, even through he verbalizes sequencing of steps to make toast very clearly). Unplugged toaster immediately after use. When completing medication set up, required general cues to reveiw bottle directions and he corrects his own error on 2 step medication.  With shopping task, did not check wallet first, but does excellent with price/size and counting out amount of money.  (Used to using Debit card for all purchases).  With phone task/last task, pt increased his level of focus and began taking notes, outlined his request ahead of making phone and gave info requested back with 100% accuracy, 6/6 accurate.          Factors affecting performance:  No additional problems noted    Recommendations:    Supervision for ADL/IADL:  Finances, Driving and Medication management  Supervision in living setting:  Weekly checks                                                       TIME ADMINISTERING TEST: 28    TIME FOR INTERPRETATION AND PREPARATION OF REPORT: 16    TOTAL TIME: 44 min      CPT Levels Reference:    Patient's Average CPT Score:  5.1                                                                                                                                                  Individual scores range along a continuum as outlined below.  In addition to cognitive status, other factors may affect safety in a home environment.  Please refer to specific recommendations for this patient.    ___5.6-5.8  Normal functioning (absence of cognitive-functional disability).  Independent in managing personal affairs, monitors and " "directs own behavior.  Uses complex information to carry out daily activities with safety and accuracy.    Proficient with instrumental activities of daily living (IADL) and learning new activity.  Problems are anticipated, errors are avoided, and consequences of actions are considered.      _XXX__5.0   Mild cognitive-functional disability; deficits in working memory and executive thought processes. Difficulty using complex information. Problems may be observed with recent memory, judgment, reasoning and planning ahead. May be impulsive or have difficulty anticipating consequences.  Safety:  May require assistance to plan ahead; or to manage complex medication schedules, appointments or finances.  Hazardous activities may need to be monitored or limited.  ADL:  Mild functional decline.  Able to complete basic self-care and routine household tasks.  May have difficulty with complex daily tasks such as reading, writing, meal preparation, shopping or driving.   Learns through hands on teaching. Self-centered behavior or difficulty considering the needs of others may be seen related to trouble seeing the  whole picture\". Can appear disorganized or uninhibited.    ___4.5  Mild to moderate cognitive-functional disability. Significant deficits in working memory and executive thought processes. Judgment, reasoning and planning show obvious impairment.  Distractible with inability to shift attention/actions given competing stimuli.  Difficulty with problem solving and managing details. Complex daily tasks performed with inconsistency, difficulty, or error.     Safety:  Medications should be monitored, stove use may require supervision, and driving ability may be affected.  Impaired safety awareness with inability to anticipate potential problems.  May not recognize or respond to emergent situations. Requires frequent check-in support.   ADL:  Mild difficulty with simple everyday self-care tasks. Benefits from structured, " routine activity.  Will likely need reminders to complete tasks outside of the routine. Requires assistance with planning and IADL tasks like shopping and finances. Learns concrete tasks through repetition, but performance may not generalize. Tends to be impulsive with poor insight. Self centered behavior or inability to consider the needs of others is common.    ___4.0  Moderate cognitive-functional disability; abstract to concrete thought processes. Working memory and executive function impairments are obvious. Difficulty with planning and problem solving.  Behavior is goal-directed, but unable to follow multi-step directions, is easily distracted, and may not recognize mistakes.  Inability to anticipate hazards or understand precautions.  Safety:  Recommend 24-hour supervision for safety. Supervision needed for medication management and for hazardous activities. May not be able to follow a restricted diet. Can get lost in unfamiliar surroundings. Generally, persons functioning at level 4 should not be driving.   ADL:  Some decline in quality or frequency of ADL.  [place] enhanced by use of a routine, simple concrete directions, and caregiver set-up of needed items. Complex tasks such as money or home management typically requires assistance.  Relies heavily on vision to guide behavior; will ignore objects/hazards not in plain sight and can be distracted by irrelevant objects. Often has poor insight.  Able to carry out social conversation and may verbally  cover  for deficits leading caregivers to believe they are capable of functioning independently.       ___3.5  Moderate cognitive-functional disability; increased cues needed for task completion. Aware of concrete task steps but needs prompting or cues to initiate and complete simple tasks. Attention span is limited, simple directions may need to be repeated, and re-focus to a topic or task may be required.  Safety:  24-hour supervision required for safety  and for assistance with daily tasks. Assistance required with medications, and access to medication should be limited. Meals, nutrition and dietary restrictions need to be monitored.  All hazardous activities should be restricted or supervised. Should not drive. Prone to wandering and can become lost.  ADL:  Moderate functional decline. Familiar tasks usually requires set-up of supplies and directions to complete steps. May need objects handed to them for task initiation. Function best with a set schedule in familiar surroundings with familiar people. All complex tasks must be done by others. Vocabulary is diminished and speech often unfocused.       Thank you for this thoughtful referral! If you have any questions, please call me at [phone].  Nice to share in this patient's care with you.    Sincerely,   Mini Sosa OTR/ti

## 2021-02-23 NOTE — PROGRESS NOTES
SUBJECTIVE:   Stevie Dotson is a 65 year old male who presents for Preventive Visit and for follow-up of meningitis.  Additionally he has concerns for low back pain and is requesting a refill in his sildenafil prescription which he uses for erectile dysfunction.    Patient has been advised of split billing requirements and indicates understanding: Yes   Are you in the first 12 months of your Medicare coverage?  Yes,  Visual Acuity:  Right Eye: 20/20   Left Eye: 20/20  Both Eyes: 20/20   Patient had an eye exam in the fall. No correction needed.    Reports that his symptoms have greatly improved.  He continues on Keppra.  Has neurology follow-up in a couple of days, finds PT and OT helpful.  No new dizziness, headache, blurry vision, numbness tingling or weakness.    Requesting a refill of Viagra, previously using 50 mg about an hour before sexual activity with good improvement of symptoms.  Denies any dizziness.    He is agreeable to add on a Medicare annual wellness visit today.    Healthy Habits:     Taking medications regularly:  0    PHQ-2 Total Score: 0  History of Present Illness       He eats 2-3 servings of fruits and vegetables daily.He consumes 1 sweetened beverage(s) daily.He exercises with enough effort to increase his heart rate 30 to 60 minutes per day.  He exercises with enough effort to increase his heart rate 7 days per week.   He is taking medications regularly.    Do you feel safe in your environment? Yes    Have you ever done Advance Care Planning? (For example, a Health Directive, POLST, or a discussion with a medical provider or your loved ones about your wishes): Yes, patient states has an Advance Care Planning document and will bring a copy to the clinic.    Fall risk    Cognitive Screening   1) Repeat 3 items (Leader, Season, Table)  All 3  2) Clock draw: NORMAL  3) 3 item recall: Recalls 3 objects  Results: NORMAL clock, 1-2 items recalled: COGNITIVE IMPAIRMENT LESS  LIKELY    Mini-CogTM Copyright S Cheryle. Licensed by the author for use in [facility]; reprinted with permission (bri@[facility]). All rights reserved.      Do you have sleep apnea, excessive snoring or daytime drowsiness?: no    Reviewed and updated as needed this visit by clinical staff  Tobacco  Allergies               Reviewed and updated as needed this visit by Provider                Social History     Tobacco Use     Smoking status: Former Smoker     Start date:      Quit date:      Years since quittin.1     Smokeless tobacco: Never Used   Substance Use Topics     Alcohol use: Yes     Comment: whenever i want         Alcohol Use 3/8/2019   Prescreen: >3 drinks/day or >7 drinks/week? Yes         Current providers sharing in care for this patient include:   Patient Care Team:  No Ref-Primary, Physician as PCP - Norman Deng MD as Assigned PCP    The following health maintenance items are reviewed in Epic and correct as of today:  Health Maintenance   Topic Date Due     ADVANCE CARE PLANNING  1955     ZOSTER IMMUNIZATION (1 of 2) 2005     MEDICARE ANNUAL WELLNESS VISIT  2020     AORTIC ANEURYSM SCREENING (SYSTEM ASSIGNED)  2020     ANNUAL REVIEW OF HM ORDERS  2022     FALL RISK ASSESSMENT  2022     LIPID  [id number]     COLORECTAL CANCER SCREENING  2024     DTAP/TDAP/TD IMMUNIZATION (2 - Td) 2025     HEPATITIS C SCREENING  Completed     PHQ-2  Completed     INFLUENZA VACCINE  Completed     Pneumococcal Vaccine: 65+ Years  Completed     HIV SCREENING  Addressed     Pneumococcal Vaccine: Pediatrics (0 to 5 Years) and At-Risk Patients (6 to 64 Years)  Aged Out     IPV IMMUNIZATION  Aged Out     MENINGITIS IMMUNIZATION  Aged Out     HEPATITIS B IMMUNIZATION  Aged Out         Review of Systems   Constitutional: Negative for chills and fever.   HENT: Negative for congestion, ear pain, hearing loss and sore throat.    Eyes: Negative for  "pain and visual disturbance.   Respiratory: Negative for cough and shortness of breath.    Cardiovascular: Negative for chest pain, palpitations and peripheral edema.   Gastrointestinal: Negative for abdominal pain, constipation, diarrhea, heartburn, hematochezia and nausea.   Genitourinary: Negative for discharge, dysuria, frequency, genital sores, hematuria, impotence and urgency.   Musculoskeletal: Positive for back pain. Negative for arthralgias, joint swelling and myalgias.   Skin: Negative for rash.   Neurological: Negative for dizziness, weakness, headaches and paresthesias.   Psychiatric/Behavioral: Negative for mood changes. The patient is not nervous/anxious.        OBJECTIVE:   /70 (Cuff Size: Adult Large)   Pulse 56   Temp 98.1  F (36.7  C) (Oral)   Resp 12   Ht 1.848 m (6' 0.75\")   Wt 105.7 kg (233 lb)   SpO2 96%   BMI 30.95 kg/m   Estimated body mass index is 30.95 kg/m  as calculated from the following:    Height as of this encounter: 1.848 m (6' 0.75\").    Weight as of this encounter: 105.7 kg (233 lb).  Physical Exam  Vitals signs reviewed.   Constitutional:       Appearance: Normal appearance. He is not ill-appearing.   Eyes:      Extraocular Movements: Extraocular movements intact.      Pupils: Pupils are equal, round, and reactive to light.   Cardiovascular:      Rate and Rhythm: Normal rate and regular rhythm.   Pulmonary:      Effort: Pulmonary effort is normal.      Breath sounds: Normal breath sounds.   Abdominal:      General: Abdomen is flat.      Palpations: Abdomen is soft.   Musculoskeletal:      Comments: Tenderness to palpation of the left lower paraspinal region.  There are no spinous process tenderness.    Lateral hips, full range of motion, no tenderness over the greater trochanteric region or over the SI joints.   Neurological:      General: No focal deficit present.      Mental Status: He is alert.   Psychiatric:         Mood and Affect: Mood normal.       Diagnostic " Test Results:  Labs reviewed in Epic    ASSESSMENT / PLAN:   1. Meningitis  Symptoms look overall to be mostly resolved.  Continues PT OT and following with neurology outpatient.  Continues on Keppra for neuro protection which will have reassessment by neurology in 2 days.  He has still not been cleared for driving which I explained to patient and wife he needs to see the neurologist.    2. Need for shingles vaccine  - zoster vaccine recombinant adjuvanted (SHINGRIX) injection; Inject 0.5 mLs into the muscle once for 1 dose  Dispense: 0.5 mL; Refill: 0    3. Encounter for Medicare annual wellness exam    4. Chronic left-sided low back pain without sciatica  Concomitant BMI of 31; recommend he starts topical Voltaren for symptom control.  Continue home PT exercises.  - diclofenac (VOLTAREN) 1 % topical gel; Apply 2 g topically 4 times daily as needed for moderate pain  Dispense: 100 g; Refill: 0    5. Former smoker  - [place] Medicare AAA Screening; Future    6. Erectile dysfunction, unspecified erectile dysfunction type  Historical medication, refilled.  Advised to monitor for dizziness.  Precautions given that if he has a painful erection lasting 4 hours or longer he needs urgent evaluation.  - sildenafil (REVATIO) 20 MG tablet; Oral: 40 mg once daily as needed 1 hour before sexual activity; may be taken up to 4 hours before sexual activity. Reduce to 20 mg once daily if side effects occur. May increase to a maximum dose of 100 mg once daily if there is incomplete response.  Dispense: 15 tablet; Refill: 5    Patient has been advised of split billing requirements and indicates understanding: Yes  COUNSELING:  Reviewed preventive health counseling, as reflected in patient instructions       Consider AAA screening for ages 65-75 and smoking history       Regular exercise       Healthy diet/nutrition    Estimated body mass index is 30.95 kg/m  as calculated from the following:    Height as of this encounter: 1.848 m  "(6' 0.75\").    Weight as of this encounter: 105.7 kg (233 lb).    Weight management plan: Discussed healthy diet and exercise guidelines    He reports that he quit smoking about 30 years ago. He started smoking about 51 years ago. He has never used smokeless tobacco.      Appropriate preventive services were discussed with this patient, including applicable screening as appropriate for cardiovascular disease, diabetes, osteopenia/osteoporosis, and glaucoma.  As appropriate for age/gender, discussed screening for colorectal cancer, prostate cancer, breast cancer, and cervical cancer. Checklist reviewing preventive services available has been given to the patient.    Reviewed patients plan of care and provided an AVS. The Basic Care Plan (routine screening as documented in Health Maintenance) for Stevie meets the Care Plan requirement. This Care Plan has been established and reviewed with the Patient.    Counseling Resources:  ATP IV Guidelines  Pooled Cohorts Equation Calculator  Breast Cancer Risk Calculator  Breast Cancer: Medication to Reduce Risk  FRAX Risk Assessment  ICSI Preventive Guidelines  Dietary Guidelines for Americans, 2010  [employer]'s MyPlate  ASA Prophylaxis  Lung CA Screening    Norman Jain MD  [facility]    Identified Health Risks:  "

## 2021-02-25 ENCOUNTER — OFFICE VISIT (OUTPATIENT)
Dept: NEUROSURGERY | Facility: CLINIC | Age: 66
End: 2021-02-25
Attending: PSYCHIATRY & NEUROLOGY
Payer: COMMERCIAL

## 2021-02-25 VITALS
DIASTOLIC BLOOD PRESSURE: 85 MMHG | OXYGEN SATURATION: 98 % | HEART RATE: 63 BPM | HEIGHT: 72 IN | SYSTOLIC BLOOD PRESSURE: 134 MMHG | BODY MASS INDEX: 31.97 KG/M2 | WEIGHT: 236 LBS

## 2021-02-25 DIAGNOSIS — R56.9 SEIZURES (H): ICD-10-CM

## 2021-02-25 DIAGNOSIS — G04.2: Primary | ICD-10-CM

## 2021-02-25 DIAGNOSIS — G03.9 MENINGITIS: ICD-10-CM

## 2021-02-25 PROCEDURE — 99417 PROLNG OP E/M EACH 15 MIN: CPT | Performed by: PSYCHIATRY & NEUROLOGY

## 2021-02-25 PROCEDURE — 99215 OFFICE O/P EST HI 40 MIN: CPT | Performed by: PSYCHIATRY & NEUROLOGY

## 2021-02-25 RX ORDER — LEVETIRACETAM 1000 MG/1
1000 TABLET ORAL 2 TIMES DAILY
Qty: 62 TABLET | Refills: 3 | Status: SHIPPED | OUTPATIENT
Start: 2021-02-25 | End: 2021-04-07

## 2021-02-25 ASSESSMENT — MONTREAL COGNITIVE ASSESSMENT (MOCA)
8. SERIAL SUBTRACTION OF 7S: 1
9. REPEAT EACH SENTENCE: 2
11. FOR EACH PAIR OF WORDS, WHAT CATEGORY DO THEY BELONG TO (OUT OF 2): 1
7. [VIGILENCE] TAP WHEN HEARING DESIGNATED LETTER: 1
13. ORIENTATION SUBSCORE: 6
WHAT IS THE TOTAL SCORE (OUT OF 30): 23
WHAT LEVEL OF EDUCATION WAS ATTAINED: 1
4. NAME EACH OF THE THREE ANIMALS SHOWN: 3
6. READ LIST OF DIGITS [FORWARD/BACKWARD]: 2
VISUOSPATIAL/EXECUTIVE SUBSCORE: 3
12. MEMORY INDEX SCORE: 3
10. [FLUENCY] NAME WORDS STARTING WITH DESIGNATED LETTER: 0

## 2021-02-25 ASSESSMENT — MIFFLIN-ST. JEOR: SCORE: 1893.49

## 2021-02-25 NOTE — LETTER
2/25/2021         RE: Stevie Dotson  [address]  [place] [address]        Dear Colleague,    Thank you for referring your patient, Stevie Dtoson, to the [facility] NEUROSURGERY [facility] [place]. Please see a copy of my visit note below.    ESTABLISHED PATIENT NEUROLOGY NOTE    DATE OF VISIT: 2/25/2021  CLINIC LOCATION: [facility]  MRN: [id number]  PATIENT NAME: Stevie Dotson  YOB: 1955    PCP: Norman Jain MD    REASON FOR VISIT:   Chief Complaint   Patient presents with     Consult     MOCA, 6 week brain follow-up      SUBJECTIVE:                                                      HISTORY OF PRESENT ILLNESS: Patient is new to me, but was previously seeing by stroke neurology during recent hospital admission presents for a follow-up. Please refer to prior neurology notes for detailed information.  The patient is accompanied by his wife, who participates in the interview today.    According to chart review and patient's/his wife report, he is 65-year-old male patient without significant past medical history, who presented to [facility] ED for evaluation of left-sided weakness and confusion on 12/30/2020.  Had fever, chills, and myalgia for 2-3 days prior to it.  Head CT was negative for acute intracranial pathology.  CTA demonstrated paucity of vessels in the right hemisphere.  Bacterial meningitis was suspected based on lumbar puncture results that demonstrated glucose of 3, protein of 722, RBC of 86, and WBC of 5085.  He was transferred to [facility] for further management.  Brain MRI was negative for acute stroke, head MRA demonstrated similar to CTA findings, MR perfusion demonstrated decreased perfusion in the right hemisphere.  Repeat MRI from 1/3/2021 demonstrated interval development of abnormal signal and restricted diffusion in occipital horns of both lateral ventricles as well as scattered foci of restricted diffusion in the multiple  sulci along the right cerebral convexity along with posterior parietal convexities bilaterally, worrisome for purulent material.  No hydrocephalus, midline shift, or evidence of recent ischemic infarct was seen.  Lumbar spine MRI from 1/6/2021 demonstrated multilevel degenerative changes of the lumbar spine without significant degree of spinal canal stenosis or neuroforaminal narrowing.  All images were personally reviewed and independently interpreted.    Was treated with Decadron, ceftriaxone, acyclovir, and ampicillin.  Started on Keppra and fosphenytoin.  5-day video EEG monitoring demonstrated continued generalized theta and delta slowing with maximum slowing over the right hemisphere that felt consistent with moderate to severe encephalopathy with cortical dysfunction in the right hemisphere.  No electrographic seizures or clear epileptiform discharges were seen during monitoring.  Transferred to rehabilitation unit for therapies and eventually discharged home.  The plan was to follow-up with neurology in 6 weeks to discuss gradual taper of Keppra.    At the present time, the patient reports significant improvement of his symptoms (80% back to his baseline). Continues to have low back pain and has ongoing physical therapy. No seizures.  Finds physical and occupational therapy quite helpful.  On 2/23/2021 his CPT was 5.1/5.6, consistent with mild cognitive functional disability.  He wants to return to driving and get off Keppra. He is on 1000 mg of Keppra twice daily without noticeable side effects.    Most recent labs from January 2021 include unremarkable CMP, normal TSH (1.33), vitamin B1 (90), vitamin B12 (599), low hemoglobin of 12.9, and non-reactive hepatitis C antibody.    On review of systems, patient endorses insomnia, heart murmur, depression, and chronic bilateral leg paresthesias. All of them have been previously discussed with other medical providers. Medications, allergies, family and social  history were also reviewed. There are no changes reported by patient.  REVIEW OF SYSTEMS:                                                    10-system review was completed. Pertinent positives are included in HPI. The remainder of ROS is negative.  EXAM:                                                    Physical Exam:   Vitals: /85   Pulse 63   Ht 6' (1.829 m)   Wt 236 lb (107 kg)   SpO2 98%   BMI 32.01 kg/m    Curtis Cognitive Assessment:    Isabella Cognitive Assessment (MOCA)  Visuospatial/Executive : 3  Naming: 3  Attention - Digits: 2  Attention - Letters: 1  Attention - Subtraction: 1  Language - Repeat: 2  Language - Fluency : 0  Abstraction: 1  Delayed Recall: 3  Orientation: 6  Education: 1  MOCA Score: 23     Isabella Cognitive Assessment Score:  MOCA Score: 23/30.     General: pt is in NAD, cooperative.  Skin: normal turgor, moist mucous membranes, no lesions/rashes noticed.  HEENT: ATNC, white sclera, normal conjunctiva.  Respiratory: Symmetric lung excursion, no accessory respiratory muscle use.  Abdomen: Non distended.  Neurological: awake, cooperative, follows commands, no aphasia or dysarthria noted, cranial nerves II-XII: funduscopic exam is normal bilaterally, no ptosis, extraocular motility is full, face is symmetric, tongue is midline, equally moves all extremities, strength is 5/5 bilaterally, no pronator drift on either side, sensation to the light touch is intact bilaterally, pinprick sensation is reduced in the right foot (chronic), finger to nose intact bilaterally, casual and tandem gait is normal.  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  He demonstrated notable improvement with therapies.  His recent CPT is 5.1/5.6, consistent with mild cognitive functional disability. MoCA is 23/30, consistent with mild cognitive  impairment. He is on 1000 mg of Keppra twice daily for seizure prophylaxis without noticeable side effects.  His Keppra level was 12 on 12/31/2020.    I had a prolonged discussion with the patient and his wife regarding his current symptoms, results of the extensive evaluation in the hospital, including the review of head CT and brain MRI/vessel images, available treatment options, and the plan.    In my opinion, the patient should continue Keppra for the next 1 to 2 months before we attempt to reduce the dose.  Dose reduction should be done gradually, and we should repeat his EEG to assure absence of interictal epileptiform discharges.  We also discussed that I cannot give him guarantee that he will not have additional seizures given his history of significant CNS infection.  It is possible that his suspected, but unwitnessed, seizure was provoked by high fever and CNS infection, but it also could lead to development of focal epilepsy as a result.    He wants to return to driving, and his CPT is supportive.  In my opinion, he has potential for continued cognitive improvement over the next several months.  I placed a referral to occupational therapy driving evaluation before we make a final decision.  I advised him against driving until this testing is completed.    Diagnoses:    ICD-10-CM    1. 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 Seizures (H)  R56.9      Plan: At today's visit we thoroughly discussed current symptoms, necessary evaluation, and the plan.  I think it would be beneficial for him to complete occupational therapy driving evaluation prior to return to driving.    We decided to continue Keppra for the next 1 to 2 months prior to the attempt of reducing the dose.  The prescription was refilled.    [place] driving laws regarding spells of loss of consciousness or postural control were discussed with the patient.  No driving for 90 days after the last event.  Every episode should be reported to [facility]  by the  within 30 days.  Other safety precautions were discussed.    Next follow-up appointment is in the next 6-8 weeks or earlier if needed.    Total Time: 85 minutes spent on the date of the encounter doing chart review, history and exam, documentation and further activities as noted above.    Thang Barriga MD  [facility] Neurology  (Chart documentation was completed in part with Dragon voice-recognition software. Even though reviewed, some grammatical, spelling, and word errors may remain.)      Again, thank you for allowing me to participate in the care of your patient.        Sincerely,        Thang Barriga MD

## 2021-02-25 NOTE — PROGRESS NOTES
ESTABLISHED PATIENT NEUROLOGY NOTE    DATE OF VISIT: 2/25/2021  CLINIC LOCATION: [facility]  MRN: [id number]  PATIENT NAME: Stevie Dotson  YOB: 1955    PCP: Norman Jain MD    REASON FOR VISIT:   Chief Complaint   Patient presents with     Consult     MOCA, 6 week brain follow-up      SUBJECTIVE:                                                      HISTORY OF PRESENT ILLNESS: Patient is new to me, but was previously seeing by stroke neurology during recent hospital admission presents for a follow-up. Please refer to prior neurology notes for detailed information.  The patient is accompanied by his wife, who participates in the interview today.    According to chart review and patient's/his wife report, he is 65-year-old male patient without significant past medical history, who presented to [facility] ED for evaluation of left-sided weakness and confusion on 12/30/2020.  Had fever, chills, and myalgia for 2-3 days prior to it.  Head CT was negative for acute intracranial pathology.  CTA demonstrated paucity of vessels in the right hemisphere.  Bacterial meningitis was suspected based on lumbar puncture results that demonstrated glucose of 3, protein of 722, RBC of 86, and WBC of 5085.  He was transferred to [facility] for further management.  Brain MRI was negative for acute stroke, head MRA demonstrated similar to CTA findings, MR perfusion demonstrated decreased perfusion in the right hemisphere.  Repeat MRI from 1/3/2021 demonstrated interval development of abnormal signal and restricted diffusion in occipital horns of both lateral ventricles as well as scattered foci of restricted diffusion in the multiple sulci along the right cerebral convexity along with posterior parietal convexities bilaterally, worrisome for purulent material.  No hydrocephalus, midline shift, or evidence of recent ischemic infarct was seen.  Lumbar spine MRI from 1/6/2021 demonstrated multilevel  degenerative changes of the lumbar spine without significant degree of spinal canal stenosis or neuroforaminal narrowing.  All images were personally reviewed and independently interpreted.    Was treated with Decadron, ceftriaxone, acyclovir, and ampicillin.  Started on Keppra and fosphenytoin.  5-day video EEG monitoring demonstrated continued generalized theta and delta slowing with maximum slowing over the right hemisphere that felt consistent with moderate to severe encephalopathy with cortical dysfunction in the right hemisphere.  No electrographic seizures or clear epileptiform discharges were seen during monitoring.  Transferred to rehabilitation unit for therapies and eventually discharged home.  The plan was to follow-up with neurology in 6 weeks to discuss gradual taper of Keppra.    At the present time, the patient reports significant improvement of his symptoms (80% back to his baseline). Continues to have low back pain and has ongoing physical therapy. No seizures.  Finds physical and occupational therapy quite helpful.  On 2/23/2021 his CPT was 5.1/5.6, consistent with mild cognitive functional disability.  He wants to return to driving and get off Keppra. He is on 1000 mg of Keppra twice daily without noticeable side effects.    Most recent labs from January 2021 include unremarkable CMP, normal TSH (1.33), vitamin B1 (90), vitamin B12 (599), low hemoglobin of 12.9, and non-reactive hepatitis C antibody.    On review of systems, patient endorses insomnia, heart murmur, depression, and chronic bilateral leg paresthesias. All of them have been previously discussed with other medical providers. Medications, allergies, family and social history were also reviewed. There are no changes reported by patient.  REVIEW OF SYSTEMS:                                                    10-system review was completed. Pertinent positives are included in HPI. The remainder of ROS is negative.  EXAM:                                                     Physical Exam:   Vitals: /85   Pulse 63   Ht 6' (1.829 m)   Wt 236 lb (107 kg)   SpO2 98%   BMI 32.01 kg/m    Curtis Cognitive Assessment:    [place] Cognitive Assessment (MOCA)  Visuospatial/Executive : 3  Naming: 3  Attention - Digits: 2  Attention - Letters: 1  Attention - Subtraction: 1  Language - Repeat: 2  Language - Fluency : 0  Abstraction: 1  Delayed Recall: 3  Orientation: 6  Education: 1  MOCA Score: 23     [place] Cognitive Assessment Score:  MOCA Score: 23/30.     General: pt is in NAD, cooperative.  Skin: normal turgor, moist mucous membranes, no lesions/rashes noticed.  HEENT: ATNC, white sclera, normal conjunctiva.  Respiratory: Symmetric lung excursion, no accessory respiratory muscle use.  Abdomen: Non distended.  Neurological: awake, cooperative, follows commands, no aphasia or dysarthria noted, cranial nerves II-XII: funduscopic exam is normal bilaterally, no ptosis, extraocular motility is full, face is symmetric, tongue is midline, equally moves all extremities, strength is 5/5 bilaterally, no pronator drift on either side, sensation to the light touch is intact bilaterally, pinprick sensation is reduced in the right foot (chronic), finger to nose intact bilaterally, casual and tandem gait is normal.  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  He demonstrated notable improvement with therapies.  His recent CPT is 5.1/5.6, consistent with mild cognitive functional disability. MoCA is 23/30, consistent with mild cognitive impairment. He is on 1000 mg of Keppra twice daily for seizure prophylaxis without noticeable side effects.  His Keppra level was 12 on 12/31/2020.    I had a prolonged discussion with the patient and his wife regarding his current symptoms, results of the extensive  evaluation in the hospital, including the review of head CT and brain MRI/vessel images, available treatment options, and the plan.    In my opinion, the patient should continue Keppra for the next 1 to 2 months before we attempt to reduce the dose.  Dose reduction should be done gradually, and we should repeat his EEG to assure absence of interictal epileptiform discharges.  We also discussed that I cannot give him guarantee that he will not have additional seizures given his history of significant CNS infection.  It is possible that his suspected, but unwitnessed, seizure was provoked by high fever and CNS infection, but it also could lead to development of focal epilepsy as a result.    He wants to return to driving, and his CPT is supportive.  In my opinion, he has potential for continued cognitive improvement over the next several months.  I placed a referral to occupational therapy driving evaluation before we make a final decision.  I advised him against driving until this testing is completed.    Diagnoses:    ICD-10-CM    1. 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 Seizures (H)  R56.9      Plan: At today's visit we thoroughly discussed current symptoms, necessary evaluation, and the plan.  I think it would be beneficial for him to complete occupational therapy driving evaluation prior to return to driving.    We decided to continue Keppra for the next 1 to 2 months prior to the attempt of reducing the dose.  The prescription was refilled.    [place] driving laws regarding spells of loss of consciousness or postural control were discussed with the patient.  No driving for 90 days after the last event.  Every episode should be reported to [facility] by the  within 30 days.  Other safety precautions were discussed.    Next follow-up appointment is in the next 6-8 weeks or earlier if needed.    Total Time: 85 minutes spent on the date of the encounter doing chart review, history and exam, documentation and  further activities as noted above.    Thang Barriga MD  [facility] Neurology  (Chart documentation was completed in part with Dragon voice-recognition software. Even though reviewed, some grammatical, spelling, and word errors may remain.)

## 2021-02-25 NOTE — PATIENT INSTRUCTIONS
AFTER VISIT SUMMARY (AVS):    At today's visit we thoroughly discussed current symptoms, necessary evaluation, and the plan.  I think it would be beneficial for you to complete occupational therapy driving evaluation prior to return to driving.    We decided to continue Keppra for the next 1 to 2 months prior to the attempt of reducing the dose.  The prescription was refilled.    [place] driving laws regarding spells of loss of consciousness or postural control were discussed.  No driving for 90 days after the last event.  Every episode should be reported to DMV by the  within 30 days.  Other safety precautions were discussed.    Next follow-up appointment is in the next 6-8 weeks or earlier if needed.    Please do not hesitate to call me with any questions or concerns.    Thanks.

## 2021-02-26 NOTE — ANESTHESIA PROCEDURE NOTES
Airway   Date/Time: 12/30/2020 3:37 PM   Patient location during procedure: ICU  Staff -   CRNA: Sergio James APRN CRNA  Performed By: CRNA    Consent for Airway   Urgency: emergentConsent: The procedure was performed in an emergent situation.    Report Obtained from Primary Care Team  History regarding most recent potassium obtained: Yes  History regarding presence/absence of renal failure obtained:Yes  History regarding stroke/CVA obtained:Yes  History regarding presence/absence of NM disorder:Yes    Indications and Patient Condition  Indications for airway management: airway protection and hemodynamic instability  Mallampati: IIInduction type:intravenousMask difficulty assessment: 0 - not attempted    Final Airway Details  Final airway type: endotracheal airway  Successful airway:ETT - single  Endotracheal Airway Details   ETT size (mm): 8.0  Cuffed: yes  Cuff volume (mL): 10  Successful intubation technique: video laryngoscopy  Grade View of Cords: 1  Adjucts: stylet  Measured from: gums/teeth  Secured at (cm): 23  Secured with: commercial tube michelle  Bite block used: None    Post intubation assessment   ETT secured, Vent settings by primary/ICU team and Sedation to be ordered by primary/ICU team  Placement verified by: capnometry, equal breath sounds and chest rise   Number of attempts at approach: 1  Secured with:commercial tube michelle  Ease of procedure: easy  Dentition: Intact and Unchanged

## 2021-02-26 NOTE — ADDENDUM NOTE
Addendum  created 02/26/21 0851 by Sergio James APRN CRNA    Child order released for a procedure order, Clinical Note Signed, Intraprocedure Blocks edited, LDA created via procedure documentation, Order Canceled from Note

## 2021-03-01 ENCOUNTER — HOSPITAL ENCOUNTER (OUTPATIENT)
Dept: PHYSICAL THERAPY | Facility: CLINIC | Age: 66
Setting detail: THERAPIES SERIES
End: 2021-03-01
Attending: FAMILY MEDICINE
Payer: COMMERCIAL

## 2021-03-01 PROCEDURE — 97140 MANUAL THERAPY 1/> REGIONS: CPT | Mod: GP | Performed by: PHYSICAL THERAPIST

## 2021-03-01 PROCEDURE — 97110 THERAPEUTIC EXERCISES: CPT | Mod: GP | Performed by: PHYSICAL THERAPIST

## 2021-03-03 ENCOUNTER — HOSPITAL ENCOUNTER (OUTPATIENT)
Dept: OCCUPATIONAL THERAPY | Facility: CLINIC | Age: 66
Setting detail: THERAPIES SERIES
End: 2021-03-03
Attending: STUDENT IN AN ORGANIZED HEALTH CARE EDUCATION/TRAINING PROGRAM
Payer: COMMERCIAL

## 2021-03-03 PROCEDURE — 97140 MANUAL THERAPY 1/> REGIONS: CPT | Mod: GO | Performed by: OCCUPATIONAL THERAPIST

## 2021-03-03 PROCEDURE — 97535 SELF CARE MNGMENT TRAINING: CPT | Mod: GO | Performed by: OCCUPATIONAL THERAPIST

## 2021-03-10 ENCOUNTER — HOSPITAL ENCOUNTER (OUTPATIENT)
Dept: OCCUPATIONAL THERAPY | Facility: CLINIC | Age: 66
Setting detail: THERAPIES SERIES
End: 2021-03-10
Attending: STUDENT IN AN ORGANIZED HEALTH CARE EDUCATION/TRAINING PROGRAM
Payer: COMMERCIAL

## 2021-03-10 PROCEDURE — 97535 SELF CARE MNGMENT TRAINING: CPT | Mod: GO | Performed by: OCCUPATIONAL THERAPIST

## 2021-03-10 NOTE — PROGRESS NOTES
Outpatient Occupational Therapy Progress Note     Patient: Stevie Dotson  : 1955    Beginning/End Dates of Reporting Period:  21 to 3/10/2021    Referring Provider: Tavo Guillen MD    Therapy Diagnosis: Meningitis G03.9     Client Self Report: Pt passed his 's ltest at [employer] at the [place].  His mother is pending the last few days of hospice.  Wondering how much OT he needs to do yet, as he is back to doing his projects in the garage, managing finances for his mom, medications and now, driving.      Objective Measurements:     Objective Measure: Dynavision Mode B Div Attn   Details: 57 hits and 9/10#s (WNLs, WNl is >42 with 9/10 numbers read aloud)     Objective Measure: UE AROM   Details: L SH FL to 170 of 180 with shoulder hiking, inferior capsule tightness, pinches at bicipital groove.   H AABD to 95 of 110.  simlar restriction     Objective Measure: UE MMT   Details: SH FL 4-/5, SH ABD 4+/5 , SH ER and IR 5/5.  Slow can drop L UE WNL.                Objective Measure: Errands / Executive Function   Details: Pt does well with time estimation of 10/10 clinc based errnad task sequence; was 100% accurate and completes in 10 minutes and delayed attention for 5 min after.  Made small error with pathfinding, but was able to use signage to self correct.     Goals:     Goal Identifier LUE reaching   Goal Description Stevie will demonstrate improved LUE strength at the Shoulder flexors and abductors as evidenced by a score of at least 4-/5 or greater in MMT and through ability to complete functional reaching tasks without pain to facilitate increased independence in meal preparation and home management tasks.    Target Date 21   Date Met   3/10/2021   Progress: Pt is able to put away groceries over ehad without pain.  Reachsinlow/behind him  Can feels twinges, does better  If he turns his body.  Doing wel with HEP to keep these areas stretched out, uses  dumbbells  In his home gym x 10 planes, 20 reps each 5#.        Goal Identifier LUE ROM   Goal Description Stevie will demonstrate improved L SH abduction by completing at least 130* ROM, without pain, to support functional reaching goals as needed to facilitate increased independence in meal preparation and home management tasks.    Target Date 04/22/21   Date Met   3/10/2021   Progress: GOAL MET     Goal Identifier LUE Strengthening   Goal Description Stevie will participate in LUE HEP as prescribed by OT for improved rotator cuff strength and scapular stability to support functional reaching goals as needed to facilitate increased independence in meal preparation and home management tasks.    Target Date 04/22/21   Date Met   3/10/2021   Progress:  GOAL MET     Goal Identifier FMC   Goal Description Stevie will report at least 90% compliance with FMC tasks as prescribed by OT for improved bilateral FMC skills to facilitate increased independence and safety in meal preparation, home management tasks, and yardwork.    Target Date 04/22/21   Date Met   3/10/21   Progress: GOAL MET     Goal Identifier Memory   Goal Description Stevie will demonstrate independent use of at least 3 memory strategies in clinic during meta-memory questionnaire, multi-task memory, etc. and report use of at least 3 external aids for improved memory in home setting, to facilitate increased independence and safety in medication management and management of schedules/appointments.    Target Date 04/22/21   Date Met   3/10/21   Progress: GOAL MET     Goal Identifier Visual Scanning/Reaction Time   Goal Description Stevie will participate in I/ADL box, Dynavision (all modes), and Scancourse, demonstrating scores WNLs on all components for improved visual scanning/attention and reaction time skills to assist in increased independence and safety in community mobility tasks.    Target Date 04/22/21   Date Met   3/1/2021   Progress: GOAL MET        Progress Toward Goals:   Progress this reporting period: Pt was seen for 7 visits of skilled OT to address above goals.  Pleased with outcomes, shoulder feeling much better and feeling more confident with multi step processes of his day.  Feels like he can anticipate the next 3-4 steps of a job more easily and not ggetting lost between steps anymore.      Plan:  Discharge from therapy.    Reason for Discharge: Patient has met all goals.    Discharge Plan: Patient to continue home program. ([employer] Dumb bell ex and stretches, card/board games, word finds.)    Thank you for this thoughtful referral! If you have any questions, please call me at [phone].  Nice to share in this patient's care with you.    Sincerely,   Mini Sosa, OTR/ti

## 2021-03-17 ENCOUNTER — HOSPITAL ENCOUNTER (OUTPATIENT)
Dept: PHYSICAL THERAPY | Facility: CLINIC | Age: 66
Setting detail: THERAPIES SERIES
End: 2021-03-17
Attending: STUDENT IN AN ORGANIZED HEALTH CARE EDUCATION/TRAINING PROGRAM
Payer: COMMERCIAL

## 2021-03-17 PROCEDURE — 97110 THERAPEUTIC EXERCISES: CPT | Mod: GP | Performed by: PHYSICAL THERAPIST

## 2021-03-18 NOTE — PROGRESS NOTES
Outpatient Physical Therapy Discharge Note     Patient: Stevie Dotson  : 1955    Beginning/End Dates of Reporting Period:  2021 to 3/17/2021    Referring Provider: Tavo Guillen Diagnosis: impaired mobility and participation in daily activities most significantly influenced by LBP     Client Self Report: States he moved everything out of his Mom's apartment   No back pain. Has returned to doing work in his garage.  He continues to get some pain when he is walking - about 1.3 miles.  He states that the one thing that bothers him is that after about 5 min of standing he will get back pain.  He will get back pain if sits too long/stays in one position too long but has learned to change positions/places where he sits.  He feels he is ready to d/c and is without questions.     Objective Measurements:  See goals below for baseline measurements  Objective Measure: Oswestry  See separate documentation for full scoring.      Sum 5   Count 10   Oswestry Score (%) 10 %        Objective Measure: 25 foot walk  Details: 6.47 sec in 13 steps  no device  1.18 m per second    Objective Measure: SLS  Details: R 12.1 sec  L11 sec.     Objective Measure: repeated sit to and from stand  Details: 14 reps         Goals:  Goal Identifier Oswestry/Back Pain (baseline: 48% (severe disability), 10% = MDC, </= 20% = min disability); PLOF: no back pain)   Goal Description Pt will score </= 20 % on the Oswestry to indicate less perceived level of disability and improved QOL   Target Date 21   Date Met   3/17/2021   Progress:as above     Goal Identifier Sleep/IADLs (baseline: pt is sleeping on couch vs bed and not participating in IADLs d/t LBP, sits in recliner most of the day)   Goal Description Pt to report return to sleeping in his bed without limitation d/t LBP and return to participation in >/= 80% of previous IADLs around home without LBP.   Target Date 21   Date Met   3/17/2021   Progress: Sleeping  in his bed, Needs to change positions occasionally/or where sitting due to back discomfort.      Goal Identifier Exercise (baseline: 30 min of exs from OT/PT daily, PLOF: in the summer biking 30 miles)   Goal Description Pt to demonstrate (I) with HEP for stretching, postural and gait stability, and endurance exercises for progress towards all goals and continued self maintenance following d/c from therapy    Target Date 03/17/21   Date Met   MET   Progress:independent in HEP, good at monitoring affects from activity. Feels functional return to ADL's IADL's is most benificial     Goal Identifier Gait/Gait speed (baseline: stiffness through trunk, minimal armswing bilat, L side trunk lean reports back pain L >R, .87 m/s (household/limited community amb; 1.3 m/s = speed to cross street safely)   Goal Description Pt to demonstrate improved gait with </= minimal LBP demonstrating appropraite trunk rotation and armswing, improved gait speed for safety with community ambulation to >/= 1.3 m/s   Target Date 03/17/21   Date Met   close to meeting   Progress  - as above.      Goal Identifier SLS (baseline: L LE 2-3, R 2-4 sec - inc LBP, < 5 sec inc fall risk)   Goal Description Pt to sustain SLS >/= 5 sec towards dec fall risk and report improved tolerance without LBP   Target Date 03/17/21   Date Met   3/17/2021   Progress:as above     Goal Identifier 30 sec sit <> stand (baseline: 11.5 reps without UE support, inc RR, no inc LBP (16 reps age/gender norms)   Goal Description Pt to complete 16 reps towards improved functional LE strength with transfers considered WNL for age/gender.   Target Date 03/17/21   Date Met   improved but not fully met   Progress: improved repitions and tolerance, did not do 16 reps as per goal          Progress Toward Goals:   Progress this reporting period: Goals met/progressing towards as above. Anticipate continuation to improve in his functional activity tolerance and decreased back discomfort  with continuation of Increasing his activity, doing HEP.  He is without questions and ready for discharge from PT.      Plan:  Discharge from therapy.    Discharge:    Reason for Discharge: Patient has met  Goals. Progressing well without further PT skilled need at this time  He may benefit from further PT for his back alignment /impairment if he doesn't continue to improve. Part of alignment likely pre existing.  With too much correction pt had flareups in pain.     Equipment Issued: HEP    Discharge Plan: Patient to continue home program.  Return with new orders if declines in function. Seek further PT for back impairments prn

## 2021-03-18 NOTE — PROGRESS NOTES
03/17/21 7330   Oswestry Disability Index (JACI   Damien Robles 1980 Version 2.1a, All rights reserved)   Section 1 - Pain intensity 0   Section 2 - Personal care (washing, dressing, etc.)  0   Section 3 - Lifting 0   Section 4 - Walking 0   Section 5 - Sitting 1   Section 6 - Standing 1   Section 7 - Sleeping 1   Section 8 - Sex life (if applicable) 0   Section 9 - Social life 2   Section 10 - Traveling 0   Sum 5   Count 10   Oswestry Score (%) 10 %

## 2021-03-22 ENCOUNTER — HOSPITAL ENCOUNTER (OUTPATIENT)
Dept: ULTRASOUND IMAGING | Facility: CLINIC | Age: 66
Discharge: HOME | End: 2021-03-22
Attending: FAMILY MEDICINE | Admitting: FAMILY MEDICINE
Payer: COMMERCIAL

## 2021-03-22 DIAGNOSIS — Z87.891 FORMER SMOKER: ICD-10-CM

## 2021-03-22 PROCEDURE — 76706 US ABDL AORTA SCREEN AAA: CPT

## 2021-03-30 NOTE — PLAN OF CARE
Date & Time: 1/5 4766-1753  Diagnosis: Bacterial meningitis, encephalitis, seizure   Procedures: Extubated 1/3  Orientation/Cognitive: AOx4, flat, slow to respond,  whispers, hoarse voice  Neuros:  Ex. BUE and BLE weakness L>R and Moderate /dorsiflexion. Baseline tingling to bilateral feet.  VS/O2: VSS ex.  Hypertensive at times with -140 at times  Mobility: Strong A2 + gb/walker   Diet: Regular   Pain Management: Denies, PRNs available   Bowel & Bladder: Pt voided 450ml with PVR of 400-600. Pt then straight cathed for 550ml yenny output with PVR of 171ml.  Incontinent of bowel, smear yesterday after rectal tube was out.  Craig dc'd at 1630 yesterday.   Skin: Scab, redness to lips, lip balm applied, encourage use  CMS: Ex +2 edema to bilateral feet and ankles  Labs: BG q4, 114 and 110 overnight  Tele:    IV Access/Drips/Fluids: DAVONTE SL   Consults: ID, Hospitalist. Neuro signed off  Discharge Plan: Pending 1/7- will need ARU vs TCU  Other: Wife supportive     No acute ST elevations/depressions

## 2021-04-06 NOTE — PROGRESS NOTES
ESTABLISHED PATIENT NEUROLOGY NOTE    DATE OF VISIT: 4/7/2021  CLINIC LOCATION: [facility]  MRN: [id number]  PATIENT NAME: Stevie Dotson  YOB: 1955    PCP: Norman Jain MD    REASON FOR VISIT:   Chief Complaint   Patient presents with     Follow Up     6 week follow-up meningitis and seizures      SUBJECTIVE:                                                      HISTORY OF PRESENT ILLNESS: Patient is here to follow up regarding meningoencephalitis/suspected seizure. I initially saw the patient on 2/25/2021. Please refer to my initial and other prior notes for further information.  The patient is accompanied by his wife.    Since the last visit, the patient reports no additional seizures. Tolerates Keppra at 1000 mg twice daily without noticeable side effects.  Wonders when he could stop it.  Continues to have low back pain that is improving.  The pain started during his rehabilitation stay after the head of the bed was raised at the wrong spot.  Feels that it is significantly improved after physical therapy.  Also reports left scalp numbness in the occipital/parietal area that is improving.  Denies interval development of new focal neurological symptoms. He completed occupational therapy driving evaluation on 3/8/2021 with recommendations to continue/return to driving with physician approval.    On review of systems, patient endorses no additional active complaints. Medications, allergies, family and social history were also reviewed. There are no changes reported by patient.  REVIEW OF SYSTEMS:                                                    10-system review was completed. Pertinent positives are included in HPI. The remainder of ROS is negative.  EXAM:                                                    Physical Exam:   Vitals: /86   Pulse 60   Ht 1.829 m (6')   Wt 107.8 kg (237 lb 9.6 oz)   SpO2 94%   BMI 32.22 kg/m      General: pt is in NAD,  cooperative.  Skin: normal turgor, moist mucous membranes, no lesions/rashes noticed.  HEENT: ATNC, white sclera, normal conjunctiva.  Respiratory: Symmetric lung excursion, no accessory respiratory muscle use.  Abdomen: Non distended.  Neurological: awake, cooperative, follows commands, no exam changes compared to the prior visit.  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 At the prior visit we discussed the plan of continuing Keppra for the next 1 to 2 months and then attempting to reduce the dose.  Today, we reviewed that option again and decided to start gradually tapering him off Keppra.  We also discussed the need of repeating EEG after he is completely off Keppra to assure absence of interictal epileptiform discharges.  Will order it at the next visit.    We also discussed return to driving.  I placed an order to occupational therapy for formal driving evaluation, which was completed, and he was found safe to return to driving.    The patient also inquired about area of scalp numbness without associated pain that developed after his hospitalization.  In my opinion, it could be caused by irritation of C2-C3 nerve roots based on the location.  It is reassuring that the patient reports improvement.  We will monitor this symptom.    We also discussed his low back pain and reviewed thoracic/lumbar spine MRI images that demonstrated mild degenerative changes, but no findings to suggest the need for surgical treatment.  He reports that physical therapy was quite helpful, and the pain is currently tolerable/improving.  I advised to continue physical therapy exercises to strengthen his core muscles.  I do not think that any additional neurological interventions or testing is necessary, but we will continue to monitor this  symptom.    Diagnoses:    ICD-10-CM    1. 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 Seizures (H)  R56.9 levETIRAcetam (KEPPRA) 500 MG tablet   3. Chronic midline low back pain without sciatica  M54.5     G89.29      Plan: At today's visit we thoroughly discussed current symptoms, occupational therapy driving evaluation results, available treatment options, and the plan.    We decided to gradually lower Keppra dose.  Advised the patient to take 750 mg of Keppra twice daily for the next 3 months, then lower it to 500 mg of Keppra twice daily.  I recommended to contact my clinic with any new neurological symptoms.    We discussed that he could resume driving, based on results of occupational therapy evaluation.    Next follow-up appointment is in the next 6 months or earlier if needed.    Total Time: 40 minutes spent on the date of the encounter doing chart review, history and exam, documentation and further activities per the note.    Thang Barriga MD  [facility] Neurology  (Chart documentation was completed in part with Dragon voice-recognition software. Even though reviewed, some grammatical, spelling, and word errors may remain.)

## 2021-04-07 ENCOUNTER — OFFICE VISIT (OUTPATIENT)
Dept: NEUROLOGY | Facility: CLINIC | Age: 66
End: 2021-04-07
Attending: PSYCHIATRY & NEUROLOGY
Payer: COMMERCIAL

## 2021-04-07 VITALS
HEART RATE: 60 BPM | SYSTOLIC BLOOD PRESSURE: 131 MMHG | BODY MASS INDEX: 32.18 KG/M2 | OXYGEN SATURATION: 94 % | DIASTOLIC BLOOD PRESSURE: 86 MMHG | WEIGHT: 237.6 LBS | HEIGHT: 72 IN

## 2021-04-07 DIAGNOSIS — M54.50 CHRONIC MIDLINE LOW BACK PAIN WITHOUT SCIATICA: ICD-10-CM

## 2021-04-07 DIAGNOSIS — R56.9 SEIZURES (H): ICD-10-CM

## 2021-04-07 DIAGNOSIS — G89.29 CHRONIC MIDLINE LOW BACK PAIN WITHOUT SCIATICA: ICD-10-CM

## 2021-04-07 DIAGNOSIS — G04.2: Primary | ICD-10-CM

## 2021-04-07 PROCEDURE — 99215 OFFICE O/P EST HI 40 MIN: CPT | Performed by: PSYCHIATRY & NEUROLOGY

## 2021-04-07 PROCEDURE — G0463 HOSPITAL OUTPT CLINIC VISIT: HCPCS

## 2021-04-07 RX ORDER — LEVETIRACETAM 500 MG/1
TABLET ORAL
Qty: 273 TABLET | Refills: 1 | Status: SHIPPED | OUTPATIENT
Start: 2021-04-07 | End: 2022-04-08

## 2021-04-07 ASSESSMENT — MIFFLIN-ST. JEOR: SCORE: 1900.75

## 2021-04-07 NOTE — PATIENT INSTRUCTIONS
AFTER VISIT SUMMARY (AVS):    At today's visit we thoroughly discussed current symptoms, occupational therapy driving evaluation results, available treatment options, and the plan.    We decided gradually lower Keppra dose.  Please take 750 mg of Keppra twice daily for the next 3 months, then lower it to 500 mg of Keppra twice daily.  Please contact my clinic with any new neurological symptoms.    You could resume driving, based on results of occupational therapy evaluation.    Next follow-up appointment is in the next 6 months or earlier if needed.    Please do not hesitate to call me with any questions or concerns.    Thanks.

## 2021-04-07 NOTE — LETTER
4/7/2021         RE: Stevie Dotson  [address]  [place] [address]        Dear Colleague,    Thank you for referring your patient, Stevie Dotson, to the [facility] [place]. Please see a copy of my visit note below.    ESTABLISHED PATIENT NEUROLOGY NOTE    DATE OF VISIT: 4/7/2021  CLINIC LOCATION: [facility]  MRN: [id number]  PATIENT NAME: Stevie Dotson  YOB: 1955    PCP: Norman Jain MD    REASON FOR VISIT:   Chief Complaint   Patient presents with     Follow Up     6 week follow-up meningitis and seizures      SUBJECTIVE:                                                      HISTORY OF PRESENT ILLNESS: Patient is here to follow up regarding meningoencephalitis/suspected seizure. I initially saw the patient on 2/25/2021. Please refer to my initial and other prior notes for further information.  The patient is accompanied by his wife.    Since the last visit, the patient reports no additional seizures. Tolerates Keppra at 1000 mg twice daily without noticeable side effects.  Wonders when he could stop it.  Continues to have low back pain that is improving.  The pain started during his rehabilitation stay after the head of the bed was raised at the wrong spot.  Feels that it is significantly improved after physical therapy.  Also reports left scalp numbness in the occipital/parietal area that is improving.  Denies interval development of new focal neurological symptoms. He completed occupational therapy driving evaluation on 3/8/2021 with recommendations to continue/return to driving with physician approval.    On review of systems, patient endorses no additional active complaints. Medications, allergies, family and social history were also reviewed. There are no changes reported by patient.  REVIEW OF SYSTEMS:                                                    10-system review was completed. Pertinent positives are included in  HPI. The remainder of ROS is negative.  EXAM:                                                    Physical Exam:   Vitals: /86   Pulse 60   Ht 1.829 m (6')   Wt 107.8 kg (237 lb 9.6 oz)   SpO2 94%   BMI 32.22 kg/m      General: pt is in NAD, cooperative.  Skin: normal turgor, moist mucous membranes, no lesions/rashes noticed.  HEENT: ATNC, white sclera, normal conjunctiva.  Respiratory: Symmetric lung excursion, no accessory respiratory muscle use.  Abdomen: Non distended.  Neurological: awake, cooperative, follows commands, no exam changes compared to the prior visit.  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 At the prior visit we discussed the plan of continuing Keppra for the next 1 to 2 months and then attempting to reduce the dose.  Today, we reviewed that option again and decided to start gradually tapering him off Keppra.  We also discussed the need of repeating EEG after he is completely off Keppra to assure absence of interictal epileptiform discharges.  Will order it at the next visit.    We also discussed return to driving.  I placed an order to occupational therapy for formal driving evaluation, which was completed, and he was found safe to return to driving.    The patient also inquired about area of scalp numbness without associated pain that developed after his hospitalization.  In my opinion, it could be caused by irritation of C2-C3 nerve roots based on the location.  It is reassuring that the patient reports improvement.  We will monitor this symptom.    We also discussed his low back pain and reviewed thoracic/lumbar spine MRI images that demonstrated mild degenerative changes, but no findings to suggest the need for surgical treatment.  He reports that physical therapy was quite helpful, and the pain  is currently tolerable/improving.  I advised to continue physical therapy exercises to strengthen his core muscles.  I do not think that any additional neurological interventions or testing is necessary, but we will continue to monitor this symptom.    Diagnoses:    ICD-10-CM    1. 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 Seizures (H)  R56.9 levETIRAcetam (KEPPRA) 500 MG tablet   3. Chronic midline low back pain without sciatica  M54.5     G89.29      Plan: At today's visit we thoroughly discussed current symptoms, occupational therapy driving evaluation results, available treatment options, and the plan.    We decided to gradually lower Keppra dose.  Advised the patient to take 750 mg of Keppra twice daily for the next 3 months, then lower it to 500 mg of Keppra twice daily.  I recommended to contact my clinic with any new neurological symptoms.    We discussed that he could resume driving, based on results of occupational therapy evaluation.    Next follow-up appointment is in the next 6 months or earlier if needed.    Total Time: 40 minutes spent on the date of the encounter doing chart review, history and exam, documentation and further activities per the note.    Thang Barriga MD  [facility] Neurology  (Chart documentation was completed in part with Dragon voice-recognition software. Even though reviewed, some grammatical, spelling, and word errors may remain.)      Again, thank you for allowing me to participate in the care of your patient.        Sincerely,        Thang Barriga MD

## 2021-04-07 NOTE — NURSING NOTE
April 7, 2021 10:33 AM   Stevie Dotson is a 65 year old male who presents for:    Chief Complaint   Patient presents with     Follow Up     6 week follow-up meningitis and seizures      Initial Vitals: /86   Pulse 60   Ht 1.829 m (6')   Wt 107.8 kg (237 lb 9.6 oz)   SpO2 94%   BMI 32.22 kg/m   Estimated body mass index is 32.22 kg/m  as calculated from the following:    Height as of this encounter: 1.829 m (6').    Weight as of this encounter: 107.8 kg (237 lb 9.6 oz). Body surface area is 2.34 meters squared.  Data Unavailable Comment: Data Unavailable       Clinical concerns: Stevie Dotson is here today for 6 week follow-up for meningitis/seizures.     Santy Carrasquillo MA

## 2021-10-06 NOTE — PATIENT INSTRUCTIONS
AFTER VISIT SUMMARY (AVS):    At today's visit we thoroughly discussed current symptoms, necessary evaluation, and the plan, which includes:  Orders Placed This Encounter   Procedures     EEG Awake or Drowsy Routine     We decided to further taper you off Keppra.  Please continue 250 mg twice daily for the next 6 weeks, then take 250 mg at bedtime for 2 weeks and stop.  We will obtain EEG to check for tendency toward seizures in December.    Please contact my office if you have any additional seizures.    Next follow-up appointment is in the next 3 months or earlier if needed.    Please do not hesitate to call me with any questions or concerns.    Thanks.

## 2021-10-06 NOTE — PROGRESS NOTES
"ESTABLISHED PATIENT NEUROLOGY NOTE    DATE OF VISIT: 10/7/2021  CLINIC LOCATION: [facility]  MRN: [id number]  PATIENT NAME: Stevie Dotson  YOB: 1955    PCP: Norman Jain MD    REASON FOR VISIT:   Chief Complaint   Patient presents with     Seizures     doing well Keppra down to 250 2x daily     SUBJECTIVE:                                                      HISTORY OF PRESENT ILLNESS: Patient is here to follow up regarding suspected seizure due to meningoencephalitis.  The last visit was on 4/7/2021.  Please refer to my initial/other prior notes for further information.  The patient is accompanied by his wife, who participates in the interview.    Since the last visit, the patient reports no seizures.  At the previous visit we started to taper him off Keppra.  The plan was to gradually go down to 500 mg of Keppra twice daily, but the patient reduced it to 250 mg twice daily at the end of September.  He denies interval development of new focal neurological symptoms.    On review of systems, patient endorses no additional acute complaints. Medications, allergies, family and social history were also reviewed. There are no changes reported by patient.  REVIEW OF SYSTEMS:                                                    10-system review was completed. Pertinent positives are included in HPI. The remainder of ROS is negative.  EXAM:                                                    Physical Exam:   Vitals: /86 (BP Location: Left arm, Patient Position: Sitting)   Pulse 59   Ht 1.88 m (6' 2\")   Wt 114.3 kg (252 lb)   SpO2 97%   BMI 32.35 kg/m      General: pt is in NAD, cooperative.  Skin: normal turgor, moist mucous membranes, no lesions/rashes noticed.  HEENT: ATNC, white sclera, normal conjunctiva.  Respiratory: Symmetric lung excursion, no accessory respiratory muscle use.  Abdomen: Non distended.  Neurological: awake, cooperative, follows commands, cranial " nerves are intact, equally moves all extremities, no pronator drift, pinprick sensation is reduced in the right foot (chronic), no dysmetria bilaterally.  Casual gait is normal.  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  He is in the process of tapering off Keppra and currently at 250 mg twice daily since the end of September.  We discussed that it could be continued for the next 6 weeks, and then he could take 250 mg at bedtime for 2 weeks before stopping it.  I would like to obtain EEG after that to assure absence of epileptiform discharges.  Previously we discussed that there is no absolute guarantee that he will not have recurrent seizures given his history, but the patient is willing to accept the risks.    Diagnoses:    ICD-10-CM    1. Seizures (H)  R56.9    2. 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  I advised him to continue 250 mg twice daily for the next 6 weeks, then take 250 mg at bedtime for 2 weeks and stop.  We will obtain EEG to check for tendency toward seizures in December.    I recommended to contact my office with any additional seizures.    Next follow-up appointment is in the next 3 months or earlier if needed.    Total Time: 22 minutes spent on the date of the encounter doing chart review, history and exam, documentation and further activities per the note.    Thang Barriga MD  [facility] Neurology  (Chart documentation was completed in part with Dragon voice-recognition software. Even though reviewed, some grammatical, spelling, and word errors may remain.)

## 2021-10-07 ENCOUNTER — OFFICE VISIT (OUTPATIENT)
Dept: NEUROLOGY | Facility: CLINIC | Age: 66
End: 2021-10-07
Attending: PSYCHIATRY & NEUROLOGY
Payer: COMMERCIAL

## 2021-10-07 VITALS
SYSTOLIC BLOOD PRESSURE: 135 MMHG | BODY MASS INDEX: 32.34 KG/M2 | OXYGEN SATURATION: 97 % | DIASTOLIC BLOOD PRESSURE: 86 MMHG | HEIGHT: 74 IN | WEIGHT: 252 LBS | HEART RATE: 59 BPM

## 2021-10-07 DIAGNOSIS — R56.9 SEIZURES (H): Primary | ICD-10-CM

## 2021-10-07 DIAGNOSIS — G04.2: ICD-10-CM

## 2021-10-07 PROCEDURE — G0463 HOSPITAL OUTPT CLINIC VISIT: HCPCS

## 2021-10-07 PROCEDURE — 99213 OFFICE O/P EST LOW 20 MIN: CPT | Performed by: PSYCHIATRY & NEUROLOGY

## 2021-10-07 ASSESSMENT — MIFFLIN-ST. JEOR: SCORE: 1992.81

## 2021-10-07 NOTE — LETTER
"    10/7/2021         RE: Stevie Dotson  [address]  [place] [address]        Dear Colleague,    Thank you for referring your patient, Stevie Dotson, to the [facility] [place]. Please see a copy of my visit note below.    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  The last visit was on 4/7/2021.  Please refer to my initial/other prior notes for further information.  The patient is accompanied by his wife, who participates in the interview.    Since the last visit, the patient reports no seizures.  At the previous visit we started to taper him off Keppra.  The plan was to gradually go down to 500 mg of Keppra twice daily, but the patient reduced it to 250 mg twice daily at the end of September.  He denies interval development of new focal neurological symptoms.    On review of systems, patient endorses no additional acute complaints. Medications, allergies, family and social history were also reviewed. There are no changes reported by patient.  REVIEW OF SYSTEMS:                                                    10-system review was completed. Pertinent positives are included in HPI. The remainder of ROS is negative.  EXAM:                                                    Physical Exam:   Vitals: /86 (BP Location: Left arm, Patient Position: Sitting)   Pulse 59   Ht 1.88 m (6' 2\")   Wt 114.3 kg (252 lb)   SpO2 97%   BMI 32.35 kg/m      General: pt is in NAD, cooperative.  Skin: normal " turgor, moist mucous membranes, no lesions/rashes noticed.  HEENT: ATNC, white sclera, normal conjunctiva.  Respiratory: Symmetric lung excursion, no accessory respiratory muscle use.  Abdomen: Non distended.  Neurological: awake, cooperative, follows commands, cranial nerves are intact, equally moves all extremities, no pronator drift, pinprick sensation is reduced in the right foot (chronic), no dysmetria bilaterally.  Casual gait is normal.  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  He is in the process of tapering off Keppra and currently at 250 mg twice daily since the end of September.  We discussed that it could be continued for the next 6 weeks, and then he could take 250 mg at bedtime for 2 weeks before stopping it.  I would like to obtain EEG after that to assure absence of epileptiform discharges.  Previously we discussed that there is no absolute guarantee that he will not have recurrent seizures given his history, but the patient is willing to accept the risks.    Diagnoses:    ICD-10-CM    1. Seizures (H)  R56.9    2. 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  I advised him to continue 250 mg twice daily for the next 6 weeks, then take 250 mg at bedtime for 2 weeks and stop.  We will obtain EEG to check for tendency toward seizures in December.    I recommended to contact my office with any additional seizures.    Next follow-up appointment is in the next 3 months or earlier if needed.    Total Time: 22 minutes spent on the date of the encounter doing chart review, history and exam, documentation and  further activities per the note.    Thang Barriga MD  [facility] Neurology  (Chart documentation was completed in part with Dragon voice-recognition software. Even though reviewed, some grammatical, spelling, and word errors may remain.)      Again, thank you for allowing me to participate in the care of your patient.        Sincerely,        Thang Barriga MD

## 2021-10-10 ENCOUNTER — HEALTH MAINTENANCE LETTER (OUTPATIENT)
Age: 66
End: 2021-10-10

## 2021-11-02 ENCOUNTER — APPOINTMENT (OUTPATIENT)
Dept: URGENT CARE | Facility: CLINIC | Age: 66
End: 2021-11-02
Payer: COMMERCIAL

## 2021-12-13 ENCOUNTER — ANCILLARY PROCEDURE (OUTPATIENT)
Dept: NEUROLOGY | Facility: CLINIC | Age: 66
End: 2021-12-13
Attending: PSYCHIATRY & NEUROLOGY
Payer: COMMERCIAL

## 2021-12-13 DIAGNOSIS — R56.9 SEIZURES (H): ICD-10-CM

## 2021-12-17 ENCOUNTER — TELEPHONE (OUTPATIENT)
Dept: NEUROLOGY | Facility: CLINIC | Age: 66
End: 2021-12-17
Payer: COMMERCIAL

## 2021-12-17 NOTE — TELEPHONE ENCOUNTER
Pt called back, said his EEG was normal and he does not need an appointment to see doctor in clinic at this time.

## 2022-03-26 ENCOUNTER — HEALTH MAINTENANCE LETTER (OUTPATIENT)
Age: 67
End: 2022-03-26

## 2022-04-08 ENCOUNTER — OFFICE VISIT (OUTPATIENT)
Dept: URGENT CARE | Facility: URGENT CARE | Age: 67
End: 2022-04-08
Payer: COMMERCIAL

## 2022-04-08 VITALS
RESPIRATION RATE: 16 BRPM | HEIGHT: 72 IN | HEART RATE: 66 BPM | DIASTOLIC BLOOD PRESSURE: 76 MMHG | SYSTOLIC BLOOD PRESSURE: 118 MMHG | OXYGEN SATURATION: 97 % | TEMPERATURE: 98.7 F | WEIGHT: 252 LBS | BODY MASS INDEX: 34.13 KG/M2

## 2022-04-08 DIAGNOSIS — H60.391 INFECTIVE OTITIS EXTERNA, RIGHT: Primary | ICD-10-CM

## 2022-04-08 PROCEDURE — 99213 OFFICE O/P EST LOW 20 MIN: CPT | Performed by: PHYSICIAN ASSISTANT

## 2022-04-08 RX ORDER — CIPROFLOXACIN 500 MG/1
500 TABLET, FILM COATED ORAL 2 TIMES DAILY
Qty: 20 TABLET | Refills: 0 | Status: SHIPPED | OUTPATIENT
Start: 2022-04-08 | End: 2022-04-18

## 2022-04-08 NOTE — PROGRESS NOTES
Assessment & Plan     1. Infective otitis externa, right    - ciprofloxacin (CIPRO) 500 MG tablet; Take 1 tablet (500 mg) by mouth 2 times daily for 10 days  Dispense: 20 tablet; Refill: 0  - Otolaryngology Referral; Future    Follow up with ENT if not improving.                 BABAK Amaro [facility] ParrottYOSVANY Goldman is a 66 year old male who presents to clinic today for the following health issues:  Chief Complaint   Patient presents with     Derm Problem     possible cyst in right ear/swelling/some pain  x 8 days  ----HX of cyst in his ear     HPI    Here for right ear problem. Lost hearing yesterday. No fever. Some drainage. Feels swollen. Similar symptoms 30 years ago. Poor jaw closing due to pain. Took some advil. No swimming lately.           Review of Systems        Objective    /76 (BP Location: Right arm, Patient Position: Chair, Cuff Size: Adult Large)   Pulse 66   Temp 98.7  F (37.1  C) (Oral)   Resp 16   Ht 1.829 m (6')   Wt 114.3 kg (252 lb)   SpO2 97%   BMI 34.18 kg/m    Physical Exam  HENT:      Right Ear: Tympanic membrane normal. Decreased hearing noted. Swelling present.      Ears:

## 2022-04-20 NOTE — PROGRESS NOTES
"Pre-Visit Planning     Appointment Notes for this encounter:   reviewed - zg. Annual review of my health and if any labs or vaccines are needed    Questionnaires Reviewed/Assigned  No additional questionnaires are needed    Patient preferred phone number: [phone]      Spoke to patient via phone. Patient does not have additional questions or concerns.        Visit is not preventive.    Patient is established.    Chief complaint confirmed.    Health Maintenance Due   Topic Date Due     MENINGITIS IMMUNIZATION (1 - Risk start 2-23 months series) 11/13/2015     PHQ-2 (once per calendar year)  01/01/2022     ANNUAL REVIEW OF HM ORDERS  01/22/2022     MEDICARE ANNUAL WELLNESS VISIT  02/23/2022     FALL RISK ASSESSMENT  02/23/2022     Patient is due for:  preventive care visit  No appointment needed.    [employer]  Patient is active on [employer].    Questionnaire Review   Offered information on completing questionnaires via [employer].  Advised patient to arrive early in order to complete questionnaires.    Call Summary  \"Thank you for your time today.  If anything comes up before your appointment, please feel free to contact us at [phone].\"    "

## 2022-04-21 ENCOUNTER — OFFICE VISIT (OUTPATIENT)
Dept: FAMILY MEDICINE | Facility: CLINIC | Age: 67
End: 2022-04-21
Payer: COMMERCIAL

## 2022-04-21 VITALS
HEIGHT: 73 IN | WEIGHT: 253 LBS | SYSTOLIC BLOOD PRESSURE: 134 MMHG | HEART RATE: 68 BPM | OXYGEN SATURATION: 97 % | TEMPERATURE: 97.5 F | DIASTOLIC BLOOD PRESSURE: 84 MMHG | RESPIRATION RATE: 16 BRPM | BODY MASS INDEX: 33.53 KG/M2

## 2022-04-21 DIAGNOSIS — H66.001 NON-RECURRENT ACUTE SUPPURATIVE OTITIS MEDIA OF RIGHT EAR WITHOUT SPONTANEOUS RUPTURE OF TYMPANIC MEMBRANE: ICD-10-CM

## 2022-04-21 DIAGNOSIS — N52.9 ERECTILE DYSFUNCTION, UNSPECIFIED ERECTILE DYSFUNCTION TYPE: ICD-10-CM

## 2022-04-21 DIAGNOSIS — Z23 HIGH PRIORITY FOR 2019-NCOV VACCINE: ICD-10-CM

## 2022-04-21 DIAGNOSIS — B35.6 TINEA CRURIS: ICD-10-CM

## 2022-04-21 DIAGNOSIS — H60.391 INFECTIVE OTITIS EXTERNA, RIGHT: ICD-10-CM

## 2022-04-21 DIAGNOSIS — Z00.00 ENCOUNTER FOR MEDICARE ANNUAL WELLNESS EXAM: Primary | ICD-10-CM

## 2022-04-21 PROCEDURE — G0438 PPPS, INITIAL VISIT: HCPCS | Performed by: FAMILY MEDICINE

## 2022-04-21 PROCEDURE — 0054A COVID-19,PF,PFIZER (12+ YRS): CPT | Performed by: FAMILY MEDICINE

## 2022-04-21 PROCEDURE — 91305 COVID-19,PF,PFIZER (12+ YRS): CPT | Performed by: FAMILY MEDICINE

## 2022-04-21 PROCEDURE — 99214 OFFICE O/P EST MOD 30 MIN: CPT | Mod: 25 | Performed by: FAMILY MEDICINE

## 2022-04-21 RX ORDER — SILDENAFIL 100 MG/1
50-100 TABLET, FILM COATED ORAL DAILY PRN
Qty: 30 TABLET | Refills: 3 | Status: SHIPPED | OUTPATIENT
Start: 2022-04-21 | End: 2023-06-06

## 2022-04-21 RX ORDER — CIPROFLOXACIN AND DEXAMETHASONE 3; 1 MG/ML; MG/ML
4 SUSPENSION/ DROPS AURICULAR (OTIC) 2 TIMES DAILY
Qty: 2.8 ML | Refills: 0 | Status: SHIPPED | OUTPATIENT
Start: 2022-04-21 | End: 2022-04-28

## 2022-04-21 RX ORDER — KETOCONAZOLE 20 MG/G
CREAM TOPICAL 2 TIMES DAILY PRN
Qty: 60 G | Refills: 3 | Status: SHIPPED | OUTPATIENT
Start: 2022-04-21 | End: 2023-04-27

## 2022-04-21 SDOH — ECONOMIC STABILITY: INCOME INSECURITY: HOW HARD IS IT FOR YOU TO PAY FOR THE VERY BASICS LIKE FOOD, HOUSING, MEDICAL CARE, AND HEATING?: NOT HARD AT ALL

## 2022-04-21 SDOH — ECONOMIC STABILITY: FOOD INSECURITY: WITHIN THE PAST 12 MONTHS, YOU WORRIED THAT YOUR FOOD WOULD RUN OUT BEFORE YOU GOT MONEY TO BUY MORE.: NEVER TRUE

## 2022-04-21 SDOH — ECONOMIC STABILITY: FOOD INSECURITY: WITHIN THE PAST 12 MONTHS, THE FOOD YOU BOUGHT JUST DIDN'T LAST AND YOU DIDN'T HAVE MONEY TO GET MORE.: NEVER TRUE

## 2022-04-21 SDOH — HEALTH STABILITY: PHYSICAL HEALTH: ON AVERAGE, HOW MANY MINUTES DO YOU ENGAGE IN EXERCISE AT THIS LEVEL?: 60 MIN

## 2022-04-21 SDOH — ECONOMIC STABILITY: TRANSPORTATION INSECURITY
IN THE PAST 12 MONTHS, HAS LACK OF TRANSPORTATION KEPT YOU FROM MEETINGS, WORK, OR FROM GETTING THINGS NEEDED FOR DAILY LIVING?: NO

## 2022-04-21 SDOH — ECONOMIC STABILITY: INCOME INSECURITY: IN THE LAST 12 MONTHS, WAS THERE A TIME WHEN YOU WERE NOT ABLE TO PAY THE MORTGAGE OR RENT ON TIME?: NO

## 2022-04-21 SDOH — ECONOMIC STABILITY: TRANSPORTATION INSECURITY
IN THE PAST 12 MONTHS, HAS THE LACK OF TRANSPORTATION KEPT YOU FROM MEDICAL APPOINTMENTS OR FROM GETTING MEDICATIONS?: NO

## 2022-04-21 SDOH — HEALTH STABILITY: PHYSICAL HEALTH: ON AVERAGE, HOW MANY DAYS PER WEEK DO YOU ENGAGE IN MODERATE TO STRENUOUS EXERCISE (LIKE A BRISK WALK)?: 5 DAYS

## 2022-04-21 ASSESSMENT — SOCIAL DETERMINANTS OF HEALTH (SDOH)
IN A TYPICAL WEEK, HOW MANY TIMES DO YOU TALK ON THE PHONE WITH FAMILY, FRIENDS, OR NEIGHBORS?: THREE TIMES A WEEK
HOW OFTEN DO YOU ATTEND CHURCH OR RELIGIOUS SERVICES?: NEVER
HOW OFTEN DO YOU GET TOGETHER WITH FRIENDS OR RELATIVES?: ONCE A WEEK
DO YOU BELONG TO ANY CLUBS OR ORGANIZATIONS SUCH AS CHURCH GROUPS UNIONS, FRATERNAL OR ATHLETIC GROUPS, OR SCHOOL GROUPS?: NO

## 2022-04-21 ASSESSMENT — ENCOUNTER SYMPTOMS
ARTHRALGIAS: 0
EYE PAIN: 0
DYSURIA: 0
CONSTIPATION: 0
PARESTHESIAS: 0
FREQUENCY: 0
HEMATOCHEZIA: 0
CHILLS: 0
HEADACHES: 0
DIARRHEA: 0
MYALGIAS: 0
HEMATURIA: 0
SHORTNESS OF BREATH: 0
ABDOMINAL PAIN: 0
JOINT SWELLING: 0
NAUSEA: 0
DIZZINESS: 0
WEAKNESS: 0
NERVOUS/ANXIOUS: 0
PALPITATIONS: 0
COUGH: 0
HEARTBURN: 0
FEVER: 0
SORE THROAT: 0

## 2022-04-21 ASSESSMENT — LIFESTYLE VARIABLES
HOW OFTEN DO YOU HAVE A DRINK CONTAINING ALCOHOL: 2-4 TIMES A MONTH
HOW MANY STANDARD DRINKS CONTAINING ALCOHOL DO YOU HAVE ON A TYPICAL DAY: 3 OR 4
SKIP TO QUESTIONS 9-10: 0
AUDIT-C TOTAL SCORE: 4
HOW OFTEN DO YOU HAVE SIX OR MORE DRINKS ON ONE OCCASION: LESS THAN MONTHLY

## 2022-04-21 ASSESSMENT — ACTIVITIES OF DAILY LIVING (ADL): CURRENT_FUNCTION: NO ASSISTANCE NEEDED

## 2022-04-21 NOTE — PATIENT INSTRUCTIONS
Patient Education   Personalized Prevention Plan  You are due for the preventive services outlined below.  Your care team is available to assist you in scheduling these services.  If you have already completed any of these items, please share that information with your care team to update in your medical record.  Health Maintenance Due   Topic Date Due     Meningitis A Vaccine (1 - Risk start 2-23 months series) 11/13/2015     ANNUAL REVIEW OF HM ORDERS  01/22/2022     Annual Wellness Visit  02/23/2022     FALL RISK ASSESSMENT  02/23/2022

## 2022-04-21 NOTE — PROGRESS NOTES
"SUBJECTIVE:   Stevie Dotson is a 66 year old male who presents for Preventive Visit.      Patient has been advised of split billing requirements and indicates understanding: Yes  Are you in the first 12 months of your Medicare coverage?  Yes,  Visual Acuity:  Right Eye: 20/20   Left Eye: 20/25  Both Eyes: 20/16    Healthy Habits:     In general, how would you rate your overall health?  Excellent    Frequency of exercise:  4-5 days/week    Duration of exercise:  45-60 minutes    Do you usually eat at least 4 servings of fruit and vegetables a day, include whole grains    & fiber and avoid regularly eating high fat or \"junk\" foods?  No    Taking medications regularly:  Yes    Medication side effects:  Not applicable    Ability to successfully perform activities of daily living:  No assistance needed    Home Safety:  No safety concerns identified    Hearing Impairment:  Difficulty following a conversation in a noisy restaurant or crowded room and difficulty understanding soft or whispered speech    In the past 6 months, have you been bothered by leaking of urine?  No    In general, how would you rate your overall mental or emotional health?  Good      PHQ-2 Total Score: 0    Additional concerns today:  No      Patient has multiple additional concerns.  Recently saw in urgent care, diagnosed with otitis externa, was prescribed oral ciprofloxacin.  Continues to have some purulent drainage, painful, no fever, has not noticed any swelling around the ear.    Has noticed over the times with decreased hearing especially when making a conversations in large gatherings    Additionally gets jock itch, over-the-counter stuff only somewhat helpful, he is wondering if there is some prescription medications.    Requesting a refill on sildenafil, finds medication helpful, no dizziness, denies painful erections greater than 4 hours.  Denies any urinary difficulty including hesitancy, frequency, incomplete evacuation or " nocturia.      Do you feel safe in your environment? Yes    Have you ever done Advance Care Planning? (For example, a Health Directive, POLST, or a discussion with a medical provider or your loved ones about your wishes): No, advance care planning information given to patient to review.  Patient declined advance care planning discussion at this time.       Fall risk  Fallen 2 or more times in the past year?: No  Any fall with injury in the past year?: No    Cognitive Screening   1) Repeat 3 items (Leader, Season, Table)    2) Clock draw: NORMAL  3) 3 item recall: Recalls 3 objects  Results: 3 items recalled: COGNITIVE IMPAIRMENT LESS LIKELY    Mini-CogTM Copyright S Cheryle. Licensed by the author for use in [employer]; reprinted with permission (sosumeet@[facility]). All rights reserved.      Do you have sleep apnea, excessive snoring or daytime drowsiness?: no    Reviewed and updated as needed this visit by clinical staff   Tobacco  Allergies    Med Hx  Surg Hx  Fam Hx  Soc Hx          Reviewed and updated as needed this visit by Provider                   Social History     Tobacco Use     Smoking status: Former Smoker     Start date:      Quit date:      Years since quittin.3     Smokeless tobacco: Never Used   Substance Use Topics     Alcohol use: Yes     Comment: sober since , had 3-6 beers 2 times per week prior         Alcohol Use 2022   Prescreen: >3 drinks/day or >7 drinks/week? No         Current providers sharing in care for this patient include:   Patient Care Team:  Norman Jain MD as PCP - General (Family Medicine)  Norman Jain MD as Assigned PCP  Thang Barriga MD as Assigned Neuroscience Provider    The following health maintenance items are reviewed in Epic and correct as of today:  Health Maintenance Due   Topic Date Due     MENINGITIS IMMUNIZATION (1 - Risk start 2-23 months series) 2015     ANNUAL REVIEW OF HM ORDERS  2022      "FALL RISK ASSESSMENT  02/23/2022     Labs reviewed in EPIC          Review of Systems   Constitutional: Negative for chills and fever.   HENT: Positive for hearing loss. Negative for congestion, ear pain and sore throat.    Eyes: Negative for pain and visual disturbance.   Respiratory: Negative for cough and shortness of breath.    Cardiovascular: Negative for chest pain, palpitations and peripheral edema.   Gastrointestinal: Negative for abdominal pain, constipation, diarrhea, heartburn, hematochezia and nausea.   Genitourinary: Positive for impotence. Negative for dysuria, frequency, genital sores, hematuria, penile discharge and urgency.   Musculoskeletal: Negative for arthralgias, joint swelling and myalgias.   Skin: Positive for rash.   Neurological: Negative for dizziness, weakness, headaches and paresthesias.   Psychiatric/Behavioral: Negative for mood changes. The patient is not nervous/anxious.        OBJECTIVE:   /84   Pulse 68   Temp 97.5  F (36.4  C) (Oral)   Resp 16   Ht 1.854 m (6' 1\")   Wt 114.8 kg (253 lb)   SpO2 97%   BMI 33.38 kg/m   Estimated body mass index is 33.38 kg/m  as calculated from the following:    Height as of this encounter: 1.854 m (6' 1\").    Weight as of this encounter: 114.8 kg (253 lb).  Physical Exam  Vitals reviewed.   Constitutional:       Appearance: Normal appearance. He is not ill-appearing.   HENT:      Left Ear: Tympanic membrane normal.      Ears:      Comments: Right tympanic membrane, erythematous, bulging, there is some purulent drainage in the external canal over erythematous base.  The mastoid region appears normal.      Cardiovascular:      Rate and Rhythm: Normal rate and regular rhythm.   Pulmonary:      Effort: Pulmonary effort is normal.      Breath sounds: Normal breath sounds.   Neurological:      Mental Status: He is alert.       Diagnostic Test Results:  Labs reviewed in Epic    ASSESSMENT / PLAN:   Stevie was seen today for medicare visit and " imm/inj.    Diagnoses and all orders for this visit:    Encounter for Medicare annual wellness exam    Infective otitis externa, right  Recommend topical antibiotic therapy for otitis externa, previously prescribed orals.  Recommend starting Ciprodex.  Low suspicions for malignant otitis, no exam findings to support mastoiditis.  -     ciprofloxacin-dexamethasone (CIPRODEX) 0.3-0.1 % otic suspension; Place 4 drops into the right ear 2 times daily for 7 days    Erectile dysfunction, unspecified erectile dysfunction type  Continue sildenafil for erectile dysfunction.  Monitor for priapism and hypotension.  -     sildenafil (VIAGRA) 100 MG tablet; Take 0.5-1 tablets ( mg) by mouth daily as needed (Oral: 50 mg once daily as needed 1 hour before sexual activity; may be taken up to 4 hours before sexual activity. Reduce to 20 mg once daily if side effects occur. May increase to a maximum dose of 100 mg once daily if there is incomplete response.)    Non-recurrent acute suppurative otitis media of right ear without spontaneous rupture of tympanic membrane  Start oral Augmentin for otitis media  -     amoxicillin-clavulanate (AUGMENTIN) 875-125 MG tablet; Take 1 tablet by mouth 2 times daily for 10 days    Tinea cruris  topical ketoconazole for tinea cruris prn.  -     ketoconazole (NIZORAL) 2 % external cream; Apply topically 2 times daily as needed for itching    High priority for 2019-nCoV vaccine  -     COVID-19,PF,PFIZER (12+ Yrs GRAY LABEL)    Other orders  -     REVIEW OF HEALTH MAINTENANCE PROTOCOL ORDERS        Patient has been advised of split billing requirements and indicates understanding: Yes    COUNSELING:  Reviewed preventive health counseling, as reflected in patient instructions       Regular exercise       Healthy diet/nutrition       Immunizations    Vaccinated for: COVID-19 booster given today        Estimated body mass index is 33.38 kg/m  as calculated from the following:    Height as of this  "encounter: 1.854 m (6' 1\").    Weight as of this encounter: 114.8 kg (253 lb).    Weight management plan: Discussed healthy diet and exercise guidelines    He reports that he quit smoking about 31 years ago. He started smoking about 52 years ago. He has never used smokeless tobacco.      Appropriate preventive services were discussed with this patient, including applicable screening as appropriate for cardiovascular disease, diabetes, osteopenia/osteoporosis, and glaucoma.  As appropriate for age/gender, discussed screening for colorectal cancer, prostate cancer, breast cancer, and cervical cancer. Checklist reviewing preventive services available has been given to the patient.    Reviewed patients plan of care and provided an AVS. The Basic Care Plan (routine screening as documented in Health Maintenance) for Stevie meets the Care Plan requirement. This Care Plan has been established and reviewed with the Patient.    Counseling Resources:  ATP IV Guidelines  Pooled Cohorts Equation Calculator  Breast Cancer Risk Calculator  Breast Cancer: Medication to Reduce Risk  FRAX Risk Assessment  ICSI Preventive Guidelines  Dietary Guidelines for Americans, 2010  [employer] MyPlate  ASA Prophylaxis  Lung CA Screening    Norman Jain MD  [facility]    Identified Health Risks:  "

## 2022-09-17 ENCOUNTER — HEALTH MAINTENANCE LETTER (OUTPATIENT)
Age: 67
End: 2022-09-17

## 2023-04-27 ENCOUNTER — OFFICE VISIT (OUTPATIENT)
Dept: DERMATOLOGY | Facility: CLINIC | Age: 68
End: 2023-04-27
Payer: COMMERCIAL

## 2023-04-27 DIAGNOSIS — B35.6 TINEA CRURIS: ICD-10-CM

## 2023-04-27 DIAGNOSIS — L82.0 INFLAMED SEBORRHEIC KERATOSIS: ICD-10-CM

## 2023-04-27 DIAGNOSIS — D23.9 DERMAL NEVUS: ICD-10-CM

## 2023-04-27 DIAGNOSIS — D18.01 ANGIOMA OF SKIN: ICD-10-CM

## 2023-04-27 DIAGNOSIS — L82.1 SEBORRHEIC KERATOSIS: ICD-10-CM

## 2023-04-27 DIAGNOSIS — L81.4 LENTIGO: Primary | ICD-10-CM

## 2023-04-27 PROCEDURE — 17110 DESTRUCTION B9 LES UP TO 14: CPT | Performed by: DERMATOLOGY

## 2023-04-27 PROCEDURE — 99203 OFFICE O/P NEW LOW 30 MIN: CPT | Mod: 25 | Performed by: DERMATOLOGY

## 2023-04-27 RX ORDER — KETOCONAZOLE 20 MG/G
CREAM TOPICAL 2 TIMES DAILY PRN
Qty: 60 G | Refills: 0 | Status: SHIPPED | OUTPATIENT
Start: 2023-04-27 | End: 2023-06-06

## 2023-04-27 NOTE — TELEPHONE ENCOUNTER
Medication is being filled for 1 time refill only due to:  Patient needs to be seen because it has been more than one year since last visit.    Pt has appt scheduled  Mai DAVE RN, BSN
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
## 2023-05-31 SDOH — ECONOMIC STABILITY: INCOME INSECURITY: HOW HARD IS IT FOR YOU TO PAY FOR THE VERY BASICS LIKE FOOD, HOUSING, MEDICAL CARE, AND HEATING?: NOT HARD AT ALL

## 2023-05-31 SDOH — HEALTH STABILITY: PHYSICAL HEALTH: ON AVERAGE, HOW MANY MINUTES DO YOU ENGAGE IN EXERCISE AT THIS LEVEL?: 60 MIN

## 2023-05-31 SDOH — ECONOMIC STABILITY: INCOME INSECURITY: IN THE LAST 12 MONTHS, WAS THERE A TIME WHEN YOU WERE NOT ABLE TO PAY THE MORTGAGE OR RENT ON TIME?: NO

## 2023-05-31 SDOH — ECONOMIC STABILITY: FOOD INSECURITY: WITHIN THE PAST 12 MONTHS, YOU WORRIED THAT YOUR FOOD WOULD RUN OUT BEFORE YOU GOT MONEY TO BUY MORE.: NEVER TRUE

## 2023-05-31 SDOH — ECONOMIC STABILITY: FOOD INSECURITY: WITHIN THE PAST 12 MONTHS, THE FOOD YOU BOUGHT JUST DIDN'T LAST AND YOU DIDN'T HAVE MONEY TO GET MORE.: NEVER TRUE

## 2023-05-31 SDOH — HEALTH STABILITY: PHYSICAL HEALTH: ON AVERAGE, HOW MANY DAYS PER WEEK DO YOU ENGAGE IN MODERATE TO STRENUOUS EXERCISE (LIKE A BRISK WALK)?: 5 DAYS

## 2023-05-31 ASSESSMENT — SOCIAL DETERMINANTS OF HEALTH (SDOH)
IN A TYPICAL WEEK, HOW MANY TIMES DO YOU TALK ON THE PHONE WITH FAMILY, FRIENDS, OR NEIGHBORS?: MORE THAN THREE TIMES A WEEK
HOW OFTEN DO YOU GET TOGETHER WITH FRIENDS OR RELATIVES?: THREE TIMES A WEEK
DO YOU BELONG TO ANY CLUBS OR ORGANIZATIONS SUCH AS CHURCH GROUPS UNIONS, FRATERNAL OR ATHLETIC GROUPS, OR SCHOOL GROUPS?: NO
HOW OFTEN DO YOU ATTEND CHURCH OR RELIGIOUS SERVICES?: NEVER

## 2023-05-31 ASSESSMENT — LIFESTYLE VARIABLES
HOW MANY STANDARD DRINKS CONTAINING ALCOHOL DO YOU HAVE ON A TYPICAL DAY: 3 OR 4
AUDIT-C TOTAL SCORE: 5
SKIP TO QUESTIONS 9-10: 0
HOW OFTEN DO YOU HAVE SIX OR MORE DRINKS ON ONE OCCASION: LESS THAN MONTHLY
HOW OFTEN DO YOU HAVE A DRINK CONTAINING ALCOHOL: 2-3 TIMES A WEEK

## 2023-05-31 ASSESSMENT — ENCOUNTER SYMPTOMS
PALPITATIONS: 0
NERVOUS/ANXIOUS: 0
HEMATURIA: 0
FREQUENCY: 0
HEADACHES: 0
WEAKNESS: 0
CONSTIPATION: 0
DIZZINESS: 0
HEARTBURN: 0
MYALGIAS: 0
DYSURIA: 0
ABDOMINAL PAIN: 0
FEVER: 0
SHORTNESS OF BREATH: 0
DIARRHEA: 0
ARTHRALGIAS: 0
COUGH: 0
SORE THROAT: 0
NAUSEA: 0
HEMATOCHEZIA: 0
JOINT SWELLING: 0
CHILLS: 0
PARESTHESIAS: 0
EYE PAIN: 0

## 2023-05-31 ASSESSMENT — ACTIVITIES OF DAILY LIVING (ADL): CURRENT_FUNCTION: NO ASSISTANCE NEEDED

## 2023-06-04 ENCOUNTER — HEALTH MAINTENANCE LETTER (OUTPATIENT)
Age: 68
End: 2023-06-04

## 2023-06-06 ENCOUNTER — OFFICE VISIT (OUTPATIENT)
Dept: FAMILY MEDICINE | Facility: CLINIC | Age: 68
End: 2023-06-06
Payer: COMMERCIAL

## 2023-06-06 VITALS
OXYGEN SATURATION: 96 % | SYSTOLIC BLOOD PRESSURE: 144 MMHG | TEMPERATURE: 98.1 F | HEART RATE: 56 BPM | DIASTOLIC BLOOD PRESSURE: 94 MMHG | WEIGHT: 248 LBS | RESPIRATION RATE: 16 BRPM | BODY MASS INDEX: 32.87 KG/M2 | HEIGHT: 73 IN

## 2023-06-06 DIAGNOSIS — N52.9 ERECTILE DYSFUNCTION, UNSPECIFIED ERECTILE DYSFUNCTION TYPE: ICD-10-CM

## 2023-06-06 DIAGNOSIS — Z00.00 ENCOUNTER FOR MEDICARE ANNUAL WELLNESS EXAM: Primary | ICD-10-CM

## 2023-06-06 DIAGNOSIS — B35.6 TINEA CRURIS: ICD-10-CM

## 2023-06-06 LAB
ALBUMIN SERPL BCG-MCNC: 4.2 G/DL (ref 3.5–5.2)
ALP SERPL-CCNC: 58 U/L (ref 40–129)
ALT SERPL W P-5'-P-CCNC: 19 U/L (ref 10–50)
ANION GAP SERPL CALCULATED.3IONS-SCNC: 10 MMOL/L (ref 7–15)
AST SERPL W P-5'-P-CCNC: 15 U/L (ref 10–50)
BILIRUB SERPL-MCNC: 1 MG/DL
BUN SERPL-MCNC: 16 MG/DL (ref 8–23)
CALCIUM SERPL-MCNC: 9.4 MG/DL (ref 8.8–10.2)
CHLORIDE SERPL-SCNC: 104 MMOL/L (ref 98–107)
CHOLEST SERPL-MCNC: 224 MG/DL
CREAT SERPL-MCNC: 1.02 MG/DL (ref 0.67–1.17)
DEPRECATED HCO3 PLAS-SCNC: 25 MMOL/L (ref 22–29)
ERYTHROCYTE [DISTWIDTH] IN BLOOD BY AUTOMATED COUNT: 13.5 % (ref 10–15)
GFR SERPL CREATININE-BSD FRML MDRD: 81 ML/MIN/1.73M2
GLUCOSE SERPL-MCNC: 112 MG/DL (ref 70–99)
HCT VFR BLD AUTO: 46.5 % (ref 40–53)
HDLC SERPL-MCNC: 47 MG/DL
HGB BLD-MCNC: 14.9 G/DL (ref 13.3–17.7)
LDLC SERPL CALC-MCNC: 154 MG/DL
MCH RBC QN AUTO: 29.8 PG (ref 26.5–33)
MCHC RBC AUTO-ENTMCNC: 32 G/DL (ref 31.5–36.5)
MCV RBC AUTO: 93 FL (ref 78–100)
NONHDLC SERPL-MCNC: 177 MG/DL
PLATELET # BLD AUTO: 309 10E3/UL (ref 150–450)
POTASSIUM SERPL-SCNC: 4.6 MMOL/L (ref 3.4–5.3)
PROT SERPL-MCNC: 7 G/DL (ref 6.4–8.3)
RBC # BLD AUTO: 5 10E6/UL (ref 4.4–5.9)
SODIUM SERPL-SCNC: 139 MMOL/L (ref 136–145)
TRIGL SERPL-MCNC: 115 MG/DL
WBC # BLD AUTO: 7.6 10E3/UL (ref 4–11)

## 2023-06-06 PROCEDURE — 80061 LIPID PANEL: CPT | Performed by: FAMILY MEDICINE

## 2023-06-06 PROCEDURE — G0439 PPPS, SUBSEQ VISIT: HCPCS | Performed by: FAMILY MEDICINE

## 2023-06-06 PROCEDURE — 36415 COLL VENOUS BLD VENIPUNCTURE: CPT | Performed by: FAMILY MEDICINE

## 2023-06-06 PROCEDURE — 85027 COMPLETE CBC AUTOMATED: CPT | Performed by: FAMILY MEDICINE

## 2023-06-06 PROCEDURE — 99213 OFFICE O/P EST LOW 20 MIN: CPT | Mod: 25 | Performed by: FAMILY MEDICINE

## 2023-06-06 PROCEDURE — 80053 COMPREHEN METABOLIC PANEL: CPT | Performed by: FAMILY MEDICINE

## 2023-06-06 RX ORDER — SILDENAFIL 100 MG/1
50-100 TABLET, FILM COATED ORAL DAILY PRN
Qty: 30 TABLET | Refills: 3 | Status: SHIPPED | OUTPATIENT
Start: 2023-06-06

## 2023-06-06 RX ORDER — KETOCONAZOLE 20 MG/G
CREAM TOPICAL 2 TIMES DAILY PRN
Qty: 60 G | Refills: 0 | Status: SHIPPED | OUTPATIENT
Start: 2023-06-06

## 2023-06-06 ASSESSMENT — ENCOUNTER SYMPTOMS
DIZZINESS: 0
HEMATURIA: 0
PARESTHESIAS: 0
JOINT SWELLING: 0
PALPITATIONS: 0
ARTHRALGIAS: 0
WEAKNESS: 0
SHORTNESS OF BREATH: 0
FEVER: 0
HEADACHES: 0
DIARRHEA: 0
HEMATOCHEZIA: 0
NERVOUS/ANXIOUS: 0
DYSURIA: 0
HEARTBURN: 0
SORE THROAT: 0
EYE PAIN: 0
NAUSEA: 0
CHILLS: 0
CONSTIPATION: 0
ABDOMINAL PAIN: 0
MYALGIAS: 0
FREQUENCY: 0
COUGH: 0

## 2023-06-06 ASSESSMENT — ACTIVITIES OF DAILY LIVING (ADL): CURRENT_FUNCTION: NO ASSISTANCE NEEDED

## 2023-06-06 NOTE — PROGRESS NOTES
"SUBJECTIVE:   Jones is a 67 year old who presents for Preventive Visit.       View : No data to display.              Are you in the first 12 months of your Medicare coverage?  No    Healthy Habits:     In general, how would you rate your overall health?  Excellent    Frequency of exercise:  4-5 days/week    Duration of exercise:  Greater than 60 minutes    Do you usually eat at least 4 servings of fruit and vegetables a day, include whole grains    & fiber and avoid regularly eating high fat or \"junk\" foods?  No    Taking medications regularly:  Yes    Medication side effects:  None    Ability to successfully perform activities of daily living:  No assistance needed    Home Safety:  No safety concerns identified    Hearing Impairment:  Difficulty following a conversation in a noisy restaurant or crowded room and find that men's voices are easier to understand than woman's    In the past 6 months, have you been bothered by leaking of urine?  No    In general, how would you rate your overall mental or emotional health?  Excellent      PHQ-2 Total Score: 0    Additional concerns today:  No    Additionally, requesting refill of medications for jock itch and erectile dysfunction, both of these medications are helpful.    Have you ever done Advance Care Planning? (For example, a Health Directive, POLST, or a discussion with a medical provider or your loved ones about your wishes): Yes, patient states has an Advance Care Planning document and will bring a copy to the clinic.       Fall risk  Fallen 2 or more times in the past year?: No  Any fall with injury in the past year?: No    Cognitive Screening   1) Repeat 3 items (Leader, Season, Table)    2) Clock draw: NORMAL  3) 3 item recall: Recalls 3 objects  Results: NORMAL clock, 1-2 items recalled: COGNITIVE IMPAIRMENT LESS LIKELY    Mini-CogTM Copyright PREET Lobato. Licensed by the author for use in [facility]; reprinted with permission (bri@.[facility]). All rights " reserved.      Do you have sleep apnea, excessive snoring or daytime drowsiness?: no    Reviewed and updated as needed this visit by clinical staff   Tobacco  Allergies  Meds              Reviewed and updated as needed this visit by Provider                 Social History     Tobacco Use     Smoking status: Former     Packs/day: 1.00     Years: 21.00     Pack years: 21.00     Types: Cigarettes     Start date: 1970     Quit date: 1991     Years since quittin.4     Smokeless tobacco: Never   Vaping Use     Vaping status: Never Used   Substance Use Topics     Alcohol use: Yes     Comment: sober since , had 3-6 beers 2 times per week prior             2023    10:15 AM   Alcohol Use   Prescreen: >3 drinks/day or >7 drinks/week? Yes   AUDIT SCORE  6     Do you have a current opioid prescription? No  Do you use any other controlled substances or medications that are not prescribed by a provider? None        Current providers sharing in care for this patient include:   Patient Care Team:  Norman Jain MD as PCP - General (Family Medicine)  Norman Jain MD as Assigned PCP  Sergio Andrews MD as Assigned Surgical Provider    The following health maintenance items are reviewed in Epic and correct as of today:  Health Maintenance   Topic Date Due     COVID-19 Vaccine (6 - Pfizer series) 2023     MEDICARE ANNUAL WELLNESS VISIT  2023     ANNUAL REVIEW OF HM ORDERS  2023     LIPID  2024     COLORECTAL CANCER SCREENING  2024     FALL RISK ASSESSMENT  2024     DTAP/TDAP/TD IMMUNIZATION (2 - Td or Tdap) 2025     ADVANCE CARE PLANNING  2027     HEPATITIS C SCREENING  Completed     PHQ-2 (once per calendar year)  Completed     INFLUENZA VACCINE  Completed     Pneumococcal Vaccine: 65+ Years  Completed     ZOSTER IMMUNIZATION  Completed     AORTIC ANEURYSM SCREENING (SYSTEM ASSIGNED)  Completed     IPV IMMUNIZATION  Aged Out     MENINGITIS IMMUNIZATION   "Discontinued     Lab work is in process  Labs reviewed in EPIC          Review of Systems   Constitutional: Negative for chills and fever.   HENT: Negative for congestion, ear pain, hearing loss and sore throat.    Eyes: Negative for pain and visual disturbance.   Respiratory: Negative for cough and shortness of breath.    Cardiovascular: Negative for chest pain, palpitations and peripheral edema.   Gastrointestinal: Negative for abdominal pain, constipation, diarrhea, heartburn, hematochezia and nausea.   Genitourinary: Negative for dysuria, frequency, genital sores, hematuria, impotence, penile discharge and urgency.   Musculoskeletal: Negative for arthralgias, joint swelling and myalgias.   Skin: Negative for rash.   Neurological: Negative for dizziness, weakness, headaches and paresthesias.   Psychiatric/Behavioral: Negative for mood changes. The patient is not nervous/anxious.          OBJECTIVE:   BP (!) 144/94 (Cuff Size: Adult Large)   Pulse 56   Temp 98.1  F (36.7  C)   Resp 16   Ht 1.854 m (6' 1\")   Wt 112.5 kg (248 lb)   SpO2 96%   BMI 32.72 kg/m   Estimated body mass index is 32.72 kg/m  as calculated from the following:    Height as of this encounter: 1.854 m (6' 1\").    Weight as of this encounter: 112.5 kg (248 lb).  Physical Exam  GENERAL: healthy, alert and no distress  NECK: no adenopathy, no asymmetry, masses, or scars and thyroid normal to palpation  RESP: lungs clear to auscultation - no rales, rhonchi or wheezes  CV: regular rate and rhythm, normal S1 S2, no S3 or S4, no murmur, click or rub, no peripheral edema and peripheral pulses strong  ABDOMEN: soft, nontender, no hepatosplenomegaly, no masses and bowel sounds normal  MS: no gross musculoskeletal defects noted, no edema    Diagnostic Test Results:  Labs reviewed in Epic    ASSESSMENT / PLAN:   Stevie was seen today for medicare visit.    Diagnoses and all orders for this visit:    Encounter for Medicare annual wellness exam  -     " CBC with platelets; Future  -     Comprehensive metabolic panel (BMP + Alb, Alk Phos, ALT, AST, Total. Bili, TP); Future  -     Lipid panel reflex to direct LDL Non-fasting; Future  -     CBC with platelets  -     Comprehensive metabolic panel (BMP + Alb, Alk Phos, ALT, AST, Total. Bili, TP)  -     Lipid panel reflex to direct LDL Non-fasting    Tinea cruris  -     ketoconazole (NIZORAL) 2 % external cream; Apply topically 2 times daily as needed for itching Medication is being filled for 1 time refill only due to:  Patient needs to be seen because it has been more than one year since last visit.    Erectile dysfunction, unspecified erectile dysfunction type  -     sildenafil (VIAGRA) 100 MG tablet; Take 0.5-1 tablets ( mg) by mouth daily as needed (Oral: 50 mg once daily as needed 1 hour before sexual activity; may be taken up to 4 hours before sexual activity. Reduce to 20 mg once daily if side effects occur. May increase to a maximum dose of 100 mg once daily if there is incomplete response.)    Other orders  -     REVIEW OF HEALTH MAINTENANCE PROTOCOL ORDERS              COUNSELING:  Reviewed preventive health counseling, as reflected in patient instructions       Regular exercise       Healthy diet/nutrition        He reports that he quit smoking about 32 years ago. His smoking use included cigarettes. He started smoking about 53 years ago. He has a 21.00 pack-year smoking history. He has never used smokeless tobacco.      Appropriate preventive services were discussed with this patient, including applicable screening as appropriate for cardiovascular disease, diabetes, osteopenia/osteoporosis, and glaucoma.  As appropriate for age/gender, discussed screening for colorectal cancer, prostate cancer, breast cancer, and cervical cancer. Checklist reviewing preventive services available has been given to the patient.    Reviewed patients plan of care and provided an AVS. The Basic Care Plan (routine screening  as documented in Health Maintenance) for Stevie meets the Care Plan requirement. This Care Plan has been established and reviewed with the Patient.          Norman Jain MD  [facility]    Identified Health Risks:    I have reviewed Opioid Use Disorder and Substance Use Disorder risk factors and made any needed referrals.

## 2023-06-06 NOTE — PATIENT INSTRUCTIONS
Patient Education   Personalized Prevention Plan  You are due for the preventive services outlined below.  Your care team is available to assist you in scheduling these services.  If you have already completed any of these items, please share that information with your care team to update in your medical record.  Health Maintenance Due   Topic Date Due     COVID-19 Vaccine (6 - Pfizer series) 01/20/2023     Annual Wellness Visit  04/21/2023     ANNUAL REVIEW OF HM ORDERS  04/21/2023

## 2024-05-23 ENCOUNTER — PATIENT OUTREACH (OUTPATIENT)
Dept: CARE COORDINATION | Facility: CLINIC | Age: 69
End: 2024-05-23
Payer: COMMERCIAL

## 2024-06-06 ENCOUNTER — PATIENT OUTREACH (OUTPATIENT)
Dept: CARE COORDINATION | Facility: CLINIC | Age: 69
End: 2024-06-06
Payer: COMMERCIAL

## 2024-07-13 ENCOUNTER — HEALTH MAINTENANCE LETTER (OUTPATIENT)
Age: 69
End: 2024-07-13

## 2024-12-03 ENCOUNTER — PATIENT OUTREACH (OUTPATIENT)
Dept: CARE COORDINATION | Facility: CLINIC | Age: 69
End: 2024-12-03
Payer: COMMERCIAL

## 2025-03-08 NOTE — ED PROVIDER NOTES
"History     Chief Complaint:  Altered Mental Status       HPI  Stevie Dotson is a 65 year old male  who presents with left sided weakness and fever.    Of note, the patient is a transfer from [facility] where Dr. Trejo spoke with stroke neurology and was suggested to transfer the patient here for a stat MRI.  In the [facility] ED, a lumbar puncture was preformed and a blood culture preformed.  Results from [facility] included below.  History is taken from [facility] ED.    The history is provided by the EMS personnel. The history is limited by the condition of the patient.      EMS reports the patient developed a fever yesterday with ongoing fever today. Last known normal was 2200 last night, and this morning he had left sided weakness and was unable to answer questions. EMS reports he was tested for COVID yesterday, result still pending. There is no record of anticoagulation.      Collateral history obtained by wife.  She reports patient was acting differently around 1900 yesterday, and by 2200 he was \"picking at sheets.\"  Upon waking up this AM he was not speaking and not moving his L. Side.  No reported trauma.  No reported IVDA.  She does admit patient drinks ETOH last drink a few days ago.     After speaking with the patient's wife via phone at the [facility] ED, she adds that the patient began getting chills, body aches and pains on Sunday which have persisted since then.  He then went to get a COVID test yesterday.  He was acting more confused throughout the night last night and this morning.      [facility] 12/30/2020:    Imaging:  CT Head Perfusion w Contrast  IMPRESSION: Nondiagnostic study.  Reading per radiology      CTA Head Neck with Contrast  IMPRESSION:  1. Mild atherosclerotic narrowing of the supraclinoid distal right  internal carotid artery.  2. Otherwise, normal neck and head CTA.        Radiation dose for this scan was reduced using automated exposure  control, adjustment of the mA and/or kV according to " patient size, or  iterative reconstruction technique.  Reading per radiology      CT Head w/o Contrast  IMPRESSION: Normal head CT.  Reading per radiology     XR Chest Port 1 View   Pending      Laboratory:  CBC: WBC 28.8(H), HGB 15.7,   CMP: glucose 224(H), bilirubin 1.8(H), albumin 3.3(L) o/w WNL (Creatinine 0.89)  Lactic acid whole blood(result time 0844) 2.9(H)   Troponin (Collected 0830): <0.015  Alcohol ethyl: <0.01  blood gas venous and oxyhgb: pH: 7.38, PCO2: 47, PO2: 22(L), Bicarbonate: 28, Oxyhgb: 36, FIO2 room air, base excess 1.9  Blood culture pending     Cell count with differential CSF: pending  Gram stain pending     Glucose by meter(0828): 217(H)      Symptomatic Influenza A/B & SARS-CoV2 (COVID19) Virus PCR Multiplex pending       UA with microscopic: glucose 500(H), urineketon 20, protein albumin 50, mucous present o/w WNL    ECG:  ECG taken at 0938, ECG read at 0938  Sinus tachycardia  Possible left atrial enlargement   Incomplete right bundle branch block  Nonspecific ST abnormality   Abnormal ECG  Rate 121 bpm. RI interval 150 ms. QRS duration 102 ms. QT/QTc 330/468 ms. P-R-T axes 61 6 26.       Interventions:   0909 NS 1000 mL IV  0910 Acetaminophen 975 mg Rectal  931 Decadron 10 mg IV  0939 Rocephin 2 g IV        Allergies:  No Known Allergies     Medications:  Omeprazole   Viagra      Past Medical History:    Acid reflux  Hyperlipidemia       Past Surgical History:    Turbinate reduction  Small bowel resection  Pancreatectomy partial  Splenectomy      Family History:    Skin cancer      Social History:  The patient presents via EMS.   The patient lives with his wife.   Patient drinks a 12 pack of beer a week.  PCP: No Ref-Primary, Physician      Review of Systems   Unable to perform ROS: Mental status change   Constitutional: Positive for fever.   Psychiatric/Behavioral: Positive for confusion.     Physical Exam     Patient Vitals for the past 24 hrs:   BP Temp Pulse Resp SpO2 Weight    12/30/20 1034 (!) 154/90 100.7  F (38.2  C) 101 20 95 % --   12/30/20 1031 (!) 141/84 -- 101 24 -- --   12/30/20 1030 -- -- -- (!) 45 95 % 113 kg (249 lb 1.9 oz)        Physical Exam     GENERAL: mumble named, no look to left, right side preference  HEAD: atraumatic  EYES: pupils reactive, extraocular muscles intact, conjunctivae normal  ENT:  mucus membranes moist  NECK:  trachea midline, normal range of motion  RESPIRATORY: no tachypnea, breath sounds clear to auscultation   CVS: normal S1/S2, no murmurs, intact distal pulses  ABDOMEN: soft, nontender, nondistention  MUSCULOSKELETAL: no deformities  SKIN: warm and dry, no acute rashes or ulceration  NEURO:  cranial nerves intact, right sided gaze preference, L arm flexion unable to extend, bilateral toes up with Babinski reflex, right knee flexion on command, ridged left knee, tap thumb to index on right, eyes open, mumbles his name on command  PSYCH: Unable to assess      Emergency Department Course     Imaging:    MRA Brain ([place]) wo Contrast  IMPRESSION: Moderate motion artifact. No definite large vessel  occlusion. Asymmetric flow-related enhancement corresponding to the  right middle cerebral artery distribution at the M2 segments.    Reading per radiology     MR Brain w/o & w Contrast  IMPRESSION:  1. No evidence of acute ischemia or hemorrhage.  2. Volume loss and white matter T2 hyperintensities which likely  present chronic small vessel ischemic change.  3. Slight asymmetric perfusion abnormalities involving the right  cerebral hemisphere.    KAITLYNN LU MD    Reading per radiology       MRA Neck (Carotids) wo & w Contrast    IMPRESSION:  Motion limited exam. No convincing evidence of large  vessel occlusion or high-grade stenosis.    Reading per radiology     Laboratory:    Lactic acid (1033): 1.8    Blood Cultures x2: Pending      Emergency Department Course:     Reviewed:  I reviewed the patient's nursing notes, vitals, past medical  records, Care Everywhere.      Assessments:  1026 I preformed my initial assessment of the patient.    1133 I spoke with the patient's wife.     Consults:   1214 I spoke with stroke neurology regarding patient's presentation, findings, and plan of care.  Deescalate code stroke.    1220 I spoke with Dr. Ortega of the Hospitalist service regarding patient's presentation, findings, and plan of care.       Interventions:  1040 Lorazepam, 1 mg, IV injection  1130 Vancomycin  2500 mg IV    Keppra 2 g IV   1238 Omnipen 2 g IV   Zovirax      Disposition:  Admitted to Dr. Ortega.       Impression & Plan     Medical Decision Making:    Patient presents with altered mental status.  He [facility] and had a thorough work-up there including lumbar puncture and CT head.  I did call the wife to confirm history.  She notes illness starting on Sunday and confusion starting last night prior to going to bed with abnormal gait and confusion.  He also notes that he drinks 12 pack of beer a week if not more.  CSF shows meningitis type picture with 5000 white cells low glucose and high protein.  Patient received 2 g Rocephin, vancomycin, acyclovir, ampicillin.  MRI is obtained negative for acute stroke.  Patient given Ativan to see if this left arm rigidity would loosen for possible seizure and it did not.  Patient was also ordered 2 g of Keppra.  Stroke neurology called to de-escalate the code.  Spoke with the hospitalist regarding admission.  ID will be consulted.  Wife notes that he was in [facility] maybe a month ago.  He is retired and not currently working.  She notes his drinking has increased recently.  Patient does look to be protecting his airway.    Covid-19  Stevie Dotson was evaluated during a global COVID-19 pandemic, which necessitated consideration that the patient might be at risk for infection with the SARS-CoV-2 virus that causes COVID-19.   Applicable protocols for evaluation were followed  during the patient's care.   COVID-19 was considered as part of the patient's evaluation. The plan for testing is:  a test was obtained at a previous visit and reviewed & considered today.    Diagnosis:     ICD-10-CM    1. Encephalitis  G04.90    2. Meningitis  G03.9         Disposition:  Admitted to Dr. Ortega.    Scribe Disclosure:  Kristina RODRIGUEZ, am serving as a scribe at 10:40 AM on 12/30/2020 to document services personally performed by Yves Calhoun MD  based on my observations and the provider's statements to me.     [facility]      Yves Calhoun MD  12/31/20 [id number]     08-Mar-2025 17:05

## 2025-07-19 ENCOUNTER — HEALTH MAINTENANCE LETTER (OUTPATIENT)
Age: 70
End: 2025-07-19

## (undated) DEVICE — ENDO TRAP POLYP QUICK CATCH 710201

## (undated) DEVICE — KIT ENDO TURNOVER/PROCEDURE W/CLEAN A SCOPE LINERS 103888

## (undated) DEVICE — ENDO SNARE POLYPECTOMY OVAL 15MM LOOP SD-240U-15

## (undated) RX ORDER — FENTANYL CITRATE 50 UG/ML
INJECTION, SOLUTION INTRAMUSCULAR; INTRAVENOUS
Status: DISPENSED
Start: 2019-03-18